# Patient Record
Sex: FEMALE | Race: WHITE | NOT HISPANIC OR LATINO | Employment: OTHER | URBAN - METROPOLITAN AREA
[De-identification: names, ages, dates, MRNs, and addresses within clinical notes are randomized per-mention and may not be internally consistent; named-entity substitution may affect disease eponyms.]

---

## 2017-05-21 ENCOUNTER — HOSPITAL ENCOUNTER (INPATIENT)
Facility: HOSPITAL | Age: 77
LOS: 11 days | DRG: 314 | End: 2017-06-01
Attending: EMERGENCY MEDICINE | Admitting: INTERNAL MEDICINE
Payer: MEDICARE

## 2017-05-21 ENCOUNTER — APPOINTMENT (EMERGENCY)
Dept: RADIOLOGY | Facility: HOSPITAL | Age: 77
DRG: 314 | End: 2017-05-21
Payer: MEDICARE

## 2017-05-21 DIAGNOSIS — N18.6 ESRD (END STAGE RENAL DISEASE) ON DIALYSIS (HCC): ICD-10-CM

## 2017-05-21 DIAGNOSIS — Z99.2 VASCULAR DIALYSIS CATHETER IN PLACE (HCC): Chronic | ICD-10-CM

## 2017-05-21 DIAGNOSIS — Z99.2 ESRD (END STAGE RENAL DISEASE) ON DIALYSIS (HCC): ICD-10-CM

## 2017-05-21 DIAGNOSIS — A41.9 SEPSIS (HCC): Primary | ICD-10-CM

## 2017-05-21 DIAGNOSIS — G20 PARKINSON'S DISEASE (HCC): Chronic | ICD-10-CM

## 2017-05-21 PROBLEM — E78.5 HYPERLIPIDEMIA: Chronic | Status: ACTIVE | Noted: 2017-05-21

## 2017-05-21 PROBLEM — D64.9 ANEMIA: Chronic | Status: ACTIVE | Noted: 2017-05-21

## 2017-05-21 PROBLEM — I10 HYPERTENSION: Chronic | Status: ACTIVE | Noted: 2017-05-21

## 2017-05-21 PROBLEM — N28.9 RENAL DISORDER: Chronic | Status: ACTIVE | Noted: 2017-05-21

## 2017-05-21 PROBLEM — E07.9 DISEASE OF THYROID GLAND: Chronic | Status: ACTIVE | Noted: 2017-05-21

## 2017-05-21 PROBLEM — M32.9 LUPUS (HCC): Chronic | Status: ACTIVE | Noted: 2017-05-21

## 2017-05-21 PROBLEM — I50.9 CHF (CONGESTIVE HEART FAILURE) (HCC): Chronic | Status: ACTIVE | Noted: 2017-05-21

## 2017-05-21 LAB
ALBUMIN SERPL BCP-MCNC: 3.3 G/DL (ref 3.5–5)
ALP SERPL-CCNC: 93 U/L (ref 46–116)
ALT SERPL W P-5'-P-CCNC: <6 U/L (ref 12–78)
ANCILLARY VALUES: 6
ANION GAP SERPL CALCULATED.3IONS-SCNC: 9 MMOL/L (ref 4–13)
ANION GAP SERPL CALCULATED.3IONS-SCNC: 9 MMOL/L (ref 4–13)
ANISOCYTOSIS BLD QL SMEAR: PRESENT
ANISOCYTOSIS BLD QL SMEAR: PRESENT
APTT PPP: 50 SECONDS (ref 23–35)
ARTERIAL PATENCY WRIST A: ABNORMAL
AST SERPL W P-5'-P-CCNC: 13 U/L (ref 5–45)
BACTERIA UR QL AUTO: ABNORMAL /HPF
BASE EXCESS BLDA CALC-SCNC: 3 MMOL/L (ref -2–3)
BASO STIPL BLD QL SMEAR: PRESENT
BASOPHILS # BLD MANUAL: 0 THOUSAND/UL (ref 0–0.1)
BASOPHILS # BLD MANUAL: 0 THOUSAND/UL (ref 0–0.1)
BASOPHILS NFR MAR MANUAL: 0 % (ref 0–1)
BASOPHILS NFR MAR MANUAL: 0 % (ref 0–1)
BILIRUB SERPL-MCNC: 0.4 MG/DL (ref 0.2–1)
BILIRUB UR QL STRIP: NEGATIVE
BUN SERPL-MCNC: 17 MG/DL (ref 5–25)
BUN SERPL-MCNC: 20 MG/DL (ref 5–25)
CALCIUM SERPL-MCNC: 8.5 MG/DL (ref 8.3–10.1)
CALCIUM SERPL-MCNC: 9.4 MG/DL (ref 8.3–10.1)
CHLORIDE SERPL-SCNC: 102 MMOL/L (ref 100–108)
CHLORIDE SERPL-SCNC: 98 MMOL/L (ref 100–108)
CLARITY UR: ABNORMAL
CO2 SERPL-SCNC: 29 MMOL/L (ref 21–32)
CO2 SERPL-SCNC: 33 MMOL/L (ref 21–32)
COLOR UR: YELLOW
CREAT SERPL-MCNC: 3.73 MG/DL (ref 0.6–1.3)
CREAT SERPL-MCNC: 3.79 MG/DL (ref 0.6–1.3)
DOHLE BOD BLD QL SMEAR: PRESENT
DS:DELIVERY SYSTEM: ABNORMAL
EOSINOPHIL # BLD MANUAL: 0 THOUSAND/UL (ref 0–0.4)
EOSINOPHIL # BLD MANUAL: 0 THOUSAND/UL (ref 0–0.4)
EOSINOPHIL NFR BLD MANUAL: 0 % (ref 0–6)
EOSINOPHIL NFR BLD MANUAL: 0 % (ref 0–6)
ERYTHROCYTE [DISTWIDTH] IN BLOOD BY AUTOMATED COUNT: 18.7 % (ref 11.6–15.1)
ERYTHROCYTE [DISTWIDTH] IN BLOOD BY AUTOMATED COUNT: 19.1 % (ref 11.6–15.1)
FIO2 GAS DIL.REBREATH: 32 L
GFR SERPL CREATININE-BSD FRML MDRD: 11.6 ML/MIN/1.73SQ M
GFR SERPL CREATININE-BSD FRML MDRD: 11.8 ML/MIN/1.73SQ M
GLUCOSE SERPL-MCNC: 203 MG/DL (ref 65–140)
GLUCOSE SERPL-MCNC: 219 MG/DL (ref 65–140)
GLUCOSE UR STRIP-MCNC: NEGATIVE MG/DL
HCO3 BLDA-SCNC: 27.7 MMOL/L (ref 22–28)
HCT VFR BLD AUTO: 29.6 % (ref 34.8–46.1)
HCT VFR BLD AUTO: 36.5 % (ref 34.8–46.1)
HGB BLD-MCNC: 11.1 G/DL (ref 11.5–15.4)
HGB BLD-MCNC: 9.1 G/DL (ref 11.5–15.4)
HGB UR QL STRIP.AUTO: ABNORMAL
INR PPP: 1.61 (ref 0.86–1.16)
KETONES UR STRIP-MCNC: ABNORMAL MG/DL
LACTATE SERPL-SCNC: 2.4 MMOL/L (ref 0.5–2)
LACTATE SERPL-SCNC: 2.7 MMOL/L (ref 0.5–2)
LACTATE SERPL-SCNC: 3.9 MMOL/L (ref 0.5–2)
LEUKOCYTE ESTERASE UR QL STRIP: ABNORMAL
LYMPHOCYTES # BLD AUTO: 17 % (ref 14–44)
LYMPHOCYTES # BLD AUTO: 26 % (ref 14–44)
LYMPHOCYTES # BLD AUTO: 3.33 THOUSAND/UL (ref 0.6–4.47)
LYMPHOCYTES # BLD AUTO: 5.74 THOUSAND/UL (ref 0.6–4.47)
MCH RBC QN AUTO: 29.4 PG (ref 26.8–34.3)
MCH RBC QN AUTO: 29.7 PG (ref 26.8–34.3)
MCHC RBC AUTO-ENTMCNC: 30.4 G/DL (ref 31.4–37.4)
MCHC RBC AUTO-ENTMCNC: 30.7 G/DL (ref 31.4–37.4)
MCV RBC AUTO: 97 FL (ref 82–98)
MCV RBC AUTO: 97 FL (ref 82–98)
MONOCYTES # BLD AUTO: 0 THOUSAND/UL (ref 0–1.22)
MONOCYTES # BLD AUTO: 0.59 THOUSAND/UL (ref 0–1.22)
MONOCYTES NFR BLD: 0 % (ref 4–12)
MONOCYTES NFR BLD: 3 % (ref 4–12)
NEUTROPHILS # BLD MANUAL: 13.7 THOUSAND/UL (ref 1.85–7.62)
NEUTROPHILS # BLD MANUAL: 16.34 THOUSAND/UL (ref 1.85–7.62)
NEUTS BAND NFR BLD MANUAL: 1 % (ref 0–8)
NEUTS BAND NFR BLD MANUAL: 6 % (ref 0–8)
NEUTS SEG NFR BLD AUTO: 68 % (ref 43–75)
NEUTS SEG NFR BLD AUTO: 69 % (ref 43–75)
NITRITE UR QL STRIP: NEGATIVE
NON-SQ EPI CELLS URNS QL MICRO: ABNORMAL /HPF
NT-PROBNP SERPL-MCNC: ABNORMAL PG/ML
PCO2 BLD: 29 MMOL/L (ref 21–32)
PCO2 BLD: 43.2 MM HG (ref 36–44)
PH BLD: 7.42 [PH] (ref 7.35–7.45)
PH UR STRIP.AUTO: 6 [PH] (ref 4.5–8)
PLATELET # BLD AUTO: 178 THOUSANDS/UL (ref 149–390)
PLATELET # BLD AUTO: 205 THOUSANDS/UL (ref 149–390)
PLATELET BLD QL SMEAR: ADEQUATE
PLATELET BLD QL SMEAR: ADEQUATE
PMV BLD AUTO: 9.2 FL (ref 8.9–12.7)
PMV BLD AUTO: 9.8 FL (ref 8.9–12.7)
PO2 BLD: 55 MM HG (ref 75–129)
POLYCHROMASIA BLD QL SMEAR: PRESENT
POLYCHROMASIA BLD QL SMEAR: PRESENT
POTASSIUM SERPL-SCNC: 3.9 MMOL/L (ref 3.5–5.3)
POTASSIUM SERPL-SCNC: 4 MMOL/L (ref 3.5–5.3)
PROT SERPL-MCNC: 7.5 G/DL (ref 6.4–8.2)
PROT UR STRIP-MCNC: ABNORMAL MG/DL
PROTHROMBIN TIME: 19 SECONDS (ref 12.1–14.4)
RBC # BLD AUTO: 3.06 MILLION/UL (ref 3.81–5.12)
RBC # BLD AUTO: 3.78 MILLION/UL (ref 3.81–5.12)
RBC #/AREA URNS AUTO: ABNORMAL /HPF
RBC MORPH BLD: PRESENT
RBC MORPH BLD: PRESENT
SAMPLE SITE: ABNORMAL
SAO2 % BLD FROM PO2: 89 % (ref 95–98)
SODIUM SERPL-SCNC: 140 MMOL/L (ref 136–145)
SODIUM SERPL-SCNC: 140 MMOL/L (ref 136–145)
SP GR UR STRIP.AUTO: 1.02 (ref 1–1.03)
SPECIMEN SOURCE: ABNORMAL
TOTAL CELLS COUNTED SPEC: 100
TOTAL CELLS COUNTED SPEC: 100
TROPONIN I SERPL-MCNC: 0.02 NG/ML
TROPONIN I SERPL-MCNC: 0.03 NG/ML
UROBILINOGEN UR QL STRIP.AUTO: 0.2 E.U./DL
VARIANT LYMPHS # BLD AUTO: 10 %
VENTILATION VALUE: 12
WBC # BLD AUTO: 19.57 THOUSAND/UL (ref 4.31–10.16)
WBC # BLD AUTO: 22.08 THOUSAND/UL (ref 4.31–10.16)
WBC #/AREA URNS AUTO: ABNORMAL /HPF

## 2017-05-21 PROCEDURE — 94660 CPAP INITIATION&MGMT: CPT

## 2017-05-21 PROCEDURE — 84484 ASSAY OF TROPONIN QUANT: CPT | Performed by: EMERGENCY MEDICINE

## 2017-05-21 PROCEDURE — 85007 BL SMEAR W/DIFF WBC COUNT: CPT | Performed by: NURSE PRACTITIONER

## 2017-05-21 PROCEDURE — 87040 BLOOD CULTURE FOR BACTERIA: CPT | Performed by: EMERGENCY MEDICINE

## 2017-05-21 PROCEDURE — 94640 AIRWAY INHALATION TREATMENT: CPT

## 2017-05-21 PROCEDURE — 87186 SC STD MICRODIL/AGAR DIL: CPT | Performed by: EMERGENCY MEDICINE

## 2017-05-21 PROCEDURE — 80048 BASIC METABOLIC PNL TOTAL CA: CPT | Performed by: NURSE PRACTITIONER

## 2017-05-21 PROCEDURE — 36415 COLL VENOUS BLD VENIPUNCTURE: CPT | Performed by: EMERGENCY MEDICINE

## 2017-05-21 PROCEDURE — 71010 HB CHEST X-RAY 1 VIEW FRONTAL (PORTABLE): CPT

## 2017-05-21 PROCEDURE — 96365 THER/PROPH/DIAG IV INF INIT: CPT

## 2017-05-21 PROCEDURE — 83605 ASSAY OF LACTIC ACID: CPT | Performed by: EMERGENCY MEDICINE

## 2017-05-21 PROCEDURE — 83880 ASSAY OF NATRIURETIC PEPTIDE: CPT | Performed by: NURSE PRACTITIONER

## 2017-05-21 PROCEDURE — 84484 ASSAY OF TROPONIN QUANT: CPT | Performed by: NURSE PRACTITIONER

## 2017-05-21 PROCEDURE — 87077 CULTURE AEROBIC IDENTIFY: CPT | Performed by: EMERGENCY MEDICINE

## 2017-05-21 PROCEDURE — 93005 ELECTROCARDIOGRAM TRACING: CPT | Performed by: EMERGENCY MEDICINE

## 2017-05-21 PROCEDURE — 83605 ASSAY OF LACTIC ACID: CPT | Performed by: NURSE PRACTITIONER

## 2017-05-21 PROCEDURE — 85027 COMPLETE CBC AUTOMATED: CPT | Performed by: NURSE PRACTITIONER

## 2017-05-21 PROCEDURE — 81001 URINALYSIS AUTO W/SCOPE: CPT | Performed by: EMERGENCY MEDICINE

## 2017-05-21 PROCEDURE — 85730 THROMBOPLASTIN TIME PARTIAL: CPT | Performed by: EMERGENCY MEDICINE

## 2017-05-21 PROCEDURE — 87147 CULTURE TYPE IMMUNOLOGIC: CPT | Performed by: EMERGENCY MEDICINE

## 2017-05-21 PROCEDURE — 85027 COMPLETE CBC AUTOMATED: CPT | Performed by: EMERGENCY MEDICINE

## 2017-05-21 PROCEDURE — 85007 BL SMEAR W/DIFF WBC COUNT: CPT | Performed by: EMERGENCY MEDICINE

## 2017-05-21 PROCEDURE — 94760 N-INVAS EAR/PLS OXIMETRY 1: CPT

## 2017-05-21 PROCEDURE — 36600 WITHDRAWAL OF ARTERIAL BLOOD: CPT

## 2017-05-21 PROCEDURE — 96367 TX/PROPH/DG ADDL SEQ IV INF: CPT

## 2017-05-21 PROCEDURE — 80053 COMPREHEN METABOLIC PANEL: CPT | Performed by: EMERGENCY MEDICINE

## 2017-05-21 PROCEDURE — 85610 PROTHROMBIN TIME: CPT | Performed by: EMERGENCY MEDICINE

## 2017-05-21 PROCEDURE — 96366 THER/PROPH/DIAG IV INF ADDON: CPT

## 2017-05-21 PROCEDURE — 82803 BLOOD GASES ANY COMBINATION: CPT

## 2017-05-21 RX ORDER — ACETAMINOPHEN 325 MG/1
650 TABLET ORAL EVERY 4 HOURS PRN
Status: ON HOLD | COMMUNITY
End: 2017-11-22

## 2017-05-21 RX ORDER — WARFARIN SODIUM 4 MG/1
4 TABLET ORAL
COMMUNITY
End: 2017-11-14

## 2017-05-21 RX ORDER — IPRATROPIUM BROMIDE AND ALBUTEROL SULFATE 2.5; .5 MG/3ML; MG/3ML
3 SOLUTION RESPIRATORY (INHALATION) ONCE
Status: COMPLETED | OUTPATIENT
Start: 2017-05-21 | End: 2017-05-21

## 2017-05-21 RX ORDER — TRAMADOL HYDROCHLORIDE 50 MG/1
50 TABLET ORAL
COMMUNITY
End: 2017-11-14

## 2017-05-21 RX ORDER — OMEPRAZOLE 20 MG/1
20 CAPSULE, DELAYED RELEASE ORAL DAILY
COMMUNITY
End: 2017-11-14

## 2017-05-21 RX ORDER — HEPARIN SODIUM 5000 [USP'U]/ML
5000 INJECTION, SOLUTION INTRAVENOUS; SUBCUTANEOUS EVERY 8 HOURS SCHEDULED
Status: DISCONTINUED | OUTPATIENT
Start: 2017-05-22 | End: 2017-06-01

## 2017-05-21 RX ORDER — HYDROXYCHLOROQUINE SULFATE 200 MG/1
200 TABLET, FILM COATED ORAL
COMMUNITY
End: 2017-11-14

## 2017-05-21 RX ORDER — NITROGLYCERIN 0.4 MG/1
0.4 TABLET SUBLINGUAL
COMMUNITY
End: 2017-11-14

## 2017-05-21 RX ORDER — ASPIRIN 325 MG
81 TABLET ORAL DAILY
COMMUNITY
End: 2017-11-14

## 2017-05-21 RX ORDER — GABAPENTIN 300 MG/1
300 CAPSULE ORAL
COMMUNITY
End: 2017-11-14

## 2017-05-21 RX ORDER — LEVOTHYROXINE SODIUM 0.07 MG/1
75 TABLET ORAL
Status: ON HOLD | COMMUNITY
End: 2017-12-08

## 2017-05-21 RX ORDER — SODIUM CHLORIDE 9 MG/ML
1000 INJECTION, SOLUTION INTRAVENOUS ONCE
Status: COMPLETED | OUTPATIENT
Start: 2017-05-21 | End: 2017-05-21

## 2017-05-21 RX ORDER — ACETAMINOPHEN 650 MG/1
650 SUPPOSITORY RECTAL ONCE
Status: COMPLETED | OUTPATIENT
Start: 2017-05-21 | End: 2017-05-21

## 2017-05-21 RX ORDER — ACETAMINOPHEN 325 MG/1
650 TABLET ORAL EVERY 6 HOURS PRN
Status: DISCONTINUED | OUTPATIENT
Start: 2017-05-21 | End: 2017-06-01 | Stop reason: HOSPADM

## 2017-05-21 RX ORDER — ONDANSETRON 2 MG/ML
4 INJECTION INTRAMUSCULAR; INTRAVENOUS EVERY 6 HOURS PRN
Status: DISCONTINUED | OUTPATIENT
Start: 2017-05-21 | End: 2017-06-01 | Stop reason: HOSPADM

## 2017-05-21 RX ORDER — LEVOFLOXACIN 5 MG/ML
750 INJECTION, SOLUTION INTRAVENOUS ONCE
Status: COMPLETED | OUTPATIENT
Start: 2017-05-21 | End: 2017-05-21

## 2017-05-21 RX ORDER — ACETAMINOPHEN 650 MG/1
325 SUPPOSITORY RECTAL ONCE
Status: DISCONTINUED | OUTPATIENT
Start: 2017-05-21 | End: 2017-05-21

## 2017-05-21 RX ORDER — ACETAMINOPHEN 650 MG/1
SUPPOSITORY RECTAL
Status: DISPENSED
Start: 2017-05-21 | End: 2017-05-22

## 2017-05-21 RX ADMIN — ACETAMINOPHEN 650 MG: 650 SUPPOSITORY RECTAL at 13:45

## 2017-05-21 RX ADMIN — SODIUM CHLORIDE 1000 ML/HR: 0.9 INJECTION, SOLUTION INTRAVENOUS at 16:40

## 2017-05-21 RX ADMIN — SODIUM CHLORIDE 2000 ML: 0.9 INJECTION, SOLUTION INTRAVENOUS at 14:25

## 2017-05-21 RX ADMIN — IPRATROPIUM BROMIDE AND ALBUTEROL SULFATE 3 ML: 2.5; .5 SOLUTION RESPIRATORY (INHALATION) at 14:45

## 2017-05-21 RX ADMIN — HEPARIN SODIUM 5000 UNITS: 5000 INJECTION, SOLUTION INTRAVENOUS; SUBCUTANEOUS at 23:53

## 2017-05-21 RX ADMIN — LEVOFLOXACIN 750 MG: 5 INJECTION, SOLUTION INTRAVENOUS at 14:25

## 2017-05-21 RX ADMIN — METRONIDAZOLE 500 MG: 500 SOLUTION INTRAVENOUS at 16:04

## 2017-05-22 ENCOUNTER — APPOINTMENT (INPATIENT)
Dept: CT IMAGING | Facility: HOSPITAL | Age: 77
DRG: 314 | End: 2017-05-22
Payer: MEDICARE

## 2017-05-22 PROBLEM — L89.150 DECUBITUS ULCER OF SACRAL REGION, UNSTAGEABLE (HCC): Status: ACTIVE | Noted: 2017-05-22

## 2017-05-22 PROBLEM — R06.02 SOB (SHORTNESS OF BREATH): Status: ACTIVE | Noted: 2017-05-22

## 2017-05-22 PROBLEM — I48.0 PAROXYSMAL ATRIAL FIBRILLATION (HCC): Status: ACTIVE | Noted: 2017-05-22

## 2017-05-22 PROBLEM — A41.9 SEVERE SEPSIS (HCC): Status: ACTIVE | Noted: 2017-05-22

## 2017-05-22 PROBLEM — Z95.0 PACEMAKER: Chronic | Status: ACTIVE | Noted: 2017-05-22

## 2017-05-22 PROBLEM — N18.6 ESRD (END STAGE RENAL DISEASE) ON DIALYSIS (HCC): Status: ACTIVE | Noted: 2017-05-22

## 2017-05-22 PROBLEM — J96.01 ACUTE RESPIRATORY FAILURE WITH HYPOXIA (HCC): Status: ACTIVE | Noted: 2017-05-22

## 2017-05-22 PROBLEM — E11.29 TYPE 2 DIABETES MELLITUS WITH RENAL MANIFESTATIONS (HCC): Status: ACTIVE | Noted: 2017-05-22

## 2017-05-22 PROBLEM — G20 PARKINSON'S DISEASE (HCC): Chronic | Status: ACTIVE | Noted: 2017-05-22

## 2017-05-22 PROBLEM — Z99.2 ESRD (END STAGE RENAL DISEASE) ON DIALYSIS (HCC): Status: ACTIVE | Noted: 2017-05-22

## 2017-05-22 PROBLEM — R65.20 SEVERE SEPSIS (HCC): Status: ACTIVE | Noted: 2017-05-22

## 2017-05-22 PROBLEM — Z99.2 VASCULAR DIALYSIS CATHETER IN PLACE (HCC): Chronic | Status: ACTIVE | Noted: 2017-05-22

## 2017-05-22 PROBLEM — J18.9 PNEUMONIA: Status: ACTIVE | Noted: 2017-05-22

## 2017-05-22 LAB
ALBUMIN SERPL BCP-MCNC: 2.6 G/DL (ref 3.5–5)
ALP SERPL-CCNC: 72 U/L (ref 46–116)
ALT SERPL W P-5'-P-CCNC: <6 U/L (ref 12–78)
ANION GAP SERPL CALCULATED.3IONS-SCNC: 7 MMOL/L (ref 4–13)
AST SERPL W P-5'-P-CCNC: 14 U/L (ref 5–45)
BILIRUB SERPL-MCNC: 0.3 MG/DL (ref 0.2–1)
BUN SERPL-MCNC: 23 MG/DL (ref 5–25)
CALCIUM SERPL-MCNC: 8.6 MG/DL (ref 8.3–10.1)
CHLORIDE SERPL-SCNC: 101 MMOL/L (ref 100–108)
CO2 SERPL-SCNC: 32 MMOL/L (ref 21–32)
CREAT SERPL-MCNC: 3.96 MG/DL (ref 0.6–1.3)
ERYTHROCYTE [DISTWIDTH] IN BLOOD BY AUTOMATED COUNT: 18.7 % (ref 11.6–15.1)
EST. AVERAGE GLUCOSE BLD GHB EST-MCNC: 108 MG/DL
GFR SERPL CREATININE-BSD FRML MDRD: 11 ML/MIN/1.73SQ M
GLUCOSE SERPL-MCNC: 116 MG/DL (ref 65–140)
GLUCOSE SERPL-MCNC: 131 MG/DL (ref 65–140)
GLUCOSE SERPL-MCNC: 155 MG/DL (ref 65–140)
GLUCOSE SERPL-MCNC: 230 MG/DL (ref 65–140)
HBA1C MFR BLD: 5.4 % (ref 4.2–6.3)
HCT VFR BLD AUTO: 27.2 % (ref 34.8–46.1)
HGB BLD-MCNC: 8.3 G/DL (ref 11.5–15.4)
LACTATE SERPL-SCNC: 2 MMOL/L (ref 0.5–2)
MAGNESIUM SERPL-MCNC: 1.8 MG/DL (ref 1.6–2.6)
MCH RBC QN AUTO: 29.5 PG (ref 26.8–34.3)
MCHC RBC AUTO-ENTMCNC: 30.5 G/DL (ref 31.4–37.4)
MCV RBC AUTO: 97 FL (ref 82–98)
PLATELET # BLD AUTO: 180 THOUSANDS/UL (ref 149–390)
PLATELET # BLD AUTO: 189 THOUSANDS/UL (ref 149–390)
PMV BLD AUTO: 9.4 FL (ref 8.9–12.7)
PMV BLD AUTO: 9.5 FL (ref 8.9–12.7)
POTASSIUM SERPL-SCNC: 4 MMOL/L (ref 3.5–5.3)
PROT SERPL-MCNC: 6.1 G/DL (ref 6.4–8.2)
RBC # BLD AUTO: 2.81 MILLION/UL (ref 3.81–5.12)
SODIUM SERPL-SCNC: 140 MMOL/L (ref 136–145)
TROPONIN I SERPL-MCNC: 0.03 NG/ML
TROPONIN I SERPL-MCNC: 0.03 NG/ML
WBC # BLD AUTO: 15.62 THOUSAND/UL (ref 4.31–10.16)

## 2017-05-22 PROCEDURE — 87040 BLOOD CULTURE FOR BACTERIA: CPT | Performed by: NURSE PRACTITIONER

## 2017-05-22 PROCEDURE — 83036 HEMOGLOBIN GLYCOSYLATED A1C: CPT | Performed by: NURSE PRACTITIONER

## 2017-05-22 PROCEDURE — 80053 COMPREHEN METABOLIC PANEL: CPT | Performed by: NURSE PRACTITIONER

## 2017-05-22 PROCEDURE — 94760 N-INVAS EAR/PLS OXIMETRY 1: CPT

## 2017-05-22 PROCEDURE — 84484 ASSAY OF TROPONIN QUANT: CPT | Performed by: NURSE PRACTITIONER

## 2017-05-22 PROCEDURE — 83605 ASSAY OF LACTIC ACID: CPT | Performed by: NURSE PRACTITIONER

## 2017-05-22 PROCEDURE — 94640 AIRWAY INHALATION TREATMENT: CPT

## 2017-05-22 PROCEDURE — 36415 COLL VENOUS BLD VENIPUNCTURE: CPT | Performed by: NURSE PRACTITIONER

## 2017-05-22 PROCEDURE — 83735 ASSAY OF MAGNESIUM: CPT | Performed by: NURSE PRACTITIONER

## 2017-05-22 PROCEDURE — 87147 CULTURE TYPE IMMUNOLOGIC: CPT | Performed by: NURSE PRACTITIONER

## 2017-05-22 PROCEDURE — 85049 AUTOMATED PLATELET COUNT: CPT | Performed by: NURSE PRACTITIONER

## 2017-05-22 PROCEDURE — 92610 EVALUATE SWALLOWING FUNCTION: CPT

## 2017-05-22 PROCEDURE — 82948 REAGENT STRIP/BLOOD GLUCOSE: CPT

## 2017-05-22 PROCEDURE — 99285 EMERGENCY DEPT VISIT HI MDM: CPT

## 2017-05-22 PROCEDURE — 87077 CULTURE AEROBIC IDENTIFY: CPT | Performed by: NURSE PRACTITIONER

## 2017-05-22 PROCEDURE — 85027 COMPLETE CBC AUTOMATED: CPT | Performed by: NURSE PRACTITIONER

## 2017-05-22 PROCEDURE — 74176 CT ABD & PELVIS W/O CONTRAST: CPT

## 2017-05-22 RX ORDER — LEVOFLOXACIN 5 MG/ML
500 INJECTION, SOLUTION INTRAVENOUS
Status: DISCONTINUED | OUTPATIENT
Start: 2017-05-23 | End: 2017-05-23

## 2017-05-22 RX ORDER — TRAMADOL HYDROCHLORIDE 50 MG/1
50 TABLET ORAL EVERY 4 HOURS PRN
Status: DISCONTINUED | OUTPATIENT
Start: 2017-05-22 | End: 2017-06-01 | Stop reason: HOSPADM

## 2017-05-22 RX ORDER — ASPIRIN 325 MG
325 TABLET ORAL DAILY
Status: DISCONTINUED | OUTPATIENT
Start: 2017-05-22 | End: 2017-06-01 | Stop reason: HOSPADM

## 2017-05-22 RX ORDER — WARFARIN SODIUM 4 MG/1
4 TABLET ORAL
Status: DISCONTINUED | OUTPATIENT
Start: 2017-05-22 | End: 2017-06-01 | Stop reason: HOSPADM

## 2017-05-22 RX ORDER — BISACODYL 10 MG
10 SUPPOSITORY, RECTAL RECTAL DAILY PRN
Status: DISCONTINUED | OUTPATIENT
Start: 2017-05-22 | End: 2017-06-01 | Stop reason: HOSPADM

## 2017-05-22 RX ORDER — LEVOTHYROXINE SODIUM 0.07 MG/1
75 TABLET ORAL
Status: DISCONTINUED | OUTPATIENT
Start: 2017-05-22 | End: 2017-06-01 | Stop reason: HOSPADM

## 2017-05-22 RX ORDER — GABAPENTIN 300 MG/1
300 CAPSULE ORAL
Status: DISCONTINUED | OUTPATIENT
Start: 2017-05-22 | End: 2017-06-01 | Stop reason: HOSPADM

## 2017-05-22 RX ORDER — LINEZOLID 2 MG/ML
600 INJECTION, SOLUTION INTRAVENOUS EVERY 12 HOURS
Status: DISCONTINUED | OUTPATIENT
Start: 2017-05-22 | End: 2017-05-23

## 2017-05-22 RX ORDER — IPRATROPIUM BROMIDE AND ALBUTEROL SULFATE 2.5; .5 MG/3ML; MG/3ML
3 SOLUTION RESPIRATORY (INHALATION)
Status: DISCONTINUED | OUTPATIENT
Start: 2017-05-22 | End: 2017-05-23

## 2017-05-22 RX ORDER — HYDROXYCHLOROQUINE SULFATE 200 MG/1
200 TABLET, FILM COATED ORAL
Status: DISCONTINUED | OUTPATIENT
Start: 2017-05-22 | End: 2017-06-01 | Stop reason: HOSPADM

## 2017-05-22 RX ORDER — LOPERAMIDE HYDROCHLORIDE 2 MG/1
2 CAPSULE ORAL 4 TIMES DAILY PRN
Status: DISCONTINUED | OUTPATIENT
Start: 2017-05-22 | End: 2017-06-01 | Stop reason: HOSPADM

## 2017-05-22 RX ORDER — PANTOPRAZOLE SODIUM 20 MG/1
20 TABLET, DELAYED RELEASE ORAL
Status: DISCONTINUED | OUTPATIENT
Start: 2017-05-22 | End: 2017-06-01 | Stop reason: HOSPADM

## 2017-05-22 RX ADMIN — GABAPENTIN 300 MG: 300 CAPSULE ORAL at 02:44

## 2017-05-22 RX ADMIN — METOPROLOL TARTRATE 25 MG: 25 TABLET ORAL at 10:05

## 2017-05-22 RX ADMIN — CARBIDOPA AND LEVODOPA 1 TABLET: 25; 100 TABLET ORAL at 10:05

## 2017-05-22 RX ADMIN — HEPARIN SODIUM 5000 UNITS: 5000 INJECTION, SOLUTION INTRAVENOUS; SUBCUTANEOUS at 21:50

## 2017-05-22 RX ADMIN — INSULIN LISPRO 2 UNITS: 100 INJECTION, SOLUTION INTRAVENOUS; SUBCUTANEOUS at 16:21

## 2017-05-22 RX ADMIN — ASPIRIN 325 MG ORAL TABLET 325 MG: 325 PILL ORAL at 10:05

## 2017-05-22 RX ADMIN — METRONIDAZOLE 500 MG: 500 INJECTION, SOLUTION INTRAVENOUS at 17:49

## 2017-05-22 RX ADMIN — CARBIDOPA AND LEVODOPA 1 TABLET: 25; 100 TABLET ORAL at 21:50

## 2017-05-22 RX ADMIN — IPRATROPIUM BROMIDE AND ALBUTEROL SULFATE 3 ML: 2.5; .5 SOLUTION RESPIRATORY (INHALATION) at 19:17

## 2017-05-22 RX ADMIN — IPRATROPIUM BROMIDE AND ALBUTEROL SULFATE 3 ML: 2.5; .5 SOLUTION RESPIRATORY (INHALATION) at 04:37

## 2017-05-22 RX ADMIN — HEPARIN SODIUM 5000 UNITS: 5000 INJECTION, SOLUTION INTRAVENOUS; SUBCUTANEOUS at 06:07

## 2017-05-22 RX ADMIN — PANTOPRAZOLE SODIUM 20 MG: 20 TABLET, DELAYED RELEASE ORAL at 06:08

## 2017-05-22 RX ADMIN — IPRATROPIUM BROMIDE AND ALBUTEROL SULFATE 3 ML: 2.5; .5 SOLUTION RESPIRATORY (INHALATION) at 07:34

## 2017-05-22 RX ADMIN — TRAMADOL HYDROCHLORIDE 50 MG: 50 TABLET, FILM COATED ORAL at 02:46

## 2017-05-22 RX ADMIN — LEVOTHYROXINE SODIUM 75 MCG: 75 TABLET ORAL at 21:50

## 2017-05-22 RX ADMIN — METRONIDAZOLE 500 MG: 500 INJECTION, SOLUTION INTRAVENOUS at 10:40

## 2017-05-22 RX ADMIN — WARFARIN SODIUM 4 MG: 4 TABLET ORAL at 17:49

## 2017-05-22 RX ADMIN — INSULIN LISPRO 1 UNITS: 100 INJECTION, SOLUTION INTRAVENOUS; SUBCUTANEOUS at 11:48

## 2017-05-22 RX ADMIN — LEVOTHYROXINE SODIUM 75 MCG: 75 TABLET ORAL at 02:44

## 2017-05-22 RX ADMIN — INSULIN LISPRO 3 UNITS: 100 INJECTION, SOLUTION INTRAVENOUS; SUBCUTANEOUS at 22:47

## 2017-05-22 RX ADMIN — LINEZOLID 600 MG: 600 INJECTION, SOLUTION INTRAVENOUS at 16:22

## 2017-05-22 RX ADMIN — METOPROLOL TARTRATE 25 MG: 25 TABLET ORAL at 17:49

## 2017-05-22 RX ADMIN — HEPARIN SODIUM 5000 UNITS: 5000 INJECTION, SOLUTION INTRAVENOUS; SUBCUTANEOUS at 14:16

## 2017-05-22 RX ADMIN — IPRATROPIUM BROMIDE AND ALBUTEROL SULFATE 3 ML: 2.5; .5 SOLUTION RESPIRATORY (INHALATION) at 12:08

## 2017-05-22 RX ADMIN — IPRATROPIUM BROMIDE AND ALBUTEROL SULFATE 3 ML: 2.5; .5 SOLUTION RESPIRATORY (INHALATION) at 15:04

## 2017-05-22 RX ADMIN — CALCIUM ACETATE 667 MG: 667 CAPSULE ORAL at 16:20

## 2017-05-22 RX ADMIN — CARBIDOPA AND LEVODOPA 1 TABLET: 25; 100 TABLET ORAL at 16:20

## 2017-05-22 RX ADMIN — METRONIDAZOLE 500 MG: 500 INJECTION, SOLUTION INTRAVENOUS at 04:00

## 2017-05-22 RX ADMIN — GABAPENTIN 300 MG: 300 CAPSULE ORAL at 21:50

## 2017-05-22 RX ADMIN — LINEZOLID 600 MG: 600 INJECTION, SOLUTION INTRAVENOUS at 02:39

## 2017-05-22 RX ADMIN — INSULIN DETEMIR 14 UNITS: 100 INJECTION, SOLUTION SUBCUTANEOUS at 18:51

## 2017-05-23 ENCOUNTER — APPOINTMENT (INPATIENT)
Dept: RADIOLOGY | Facility: HOSPITAL | Age: 77
DRG: 314 | End: 2017-05-23
Payer: MEDICARE

## 2017-05-23 ENCOUNTER — APPOINTMENT (INPATIENT)
Dept: DIALYSIS | Facility: HOSPITAL | Age: 77
DRG: 314 | End: 2017-05-23
Payer: MEDICARE

## 2017-05-23 LAB
ANION GAP SERPL CALCULATED.3IONS-SCNC: 9 MMOL/L (ref 4–13)
ATRIAL RATE: 80 BPM
BASOPHILS # BLD AUTO: 0.02 THOUSANDS/ΜL (ref 0–0.1)
BASOPHILS NFR BLD AUTO: 0 % (ref 0–1)
BUN SERPL-MCNC: 29 MG/DL (ref 5–25)
CALCIUM SERPL-MCNC: 8.2 MG/DL (ref 8.3–10.1)
CHLORIDE SERPL-SCNC: 100 MMOL/L (ref 100–108)
CO2 SERPL-SCNC: 29 MMOL/L (ref 21–32)
CREAT SERPL-MCNC: 4.89 MG/DL (ref 0.6–1.3)
EOSINOPHIL # BLD AUTO: 0.12 THOUSAND/ΜL (ref 0–0.61)
EOSINOPHIL NFR BLD AUTO: 1 % (ref 0–6)
ERYTHROCYTE [DISTWIDTH] IN BLOOD BY AUTOMATED COUNT: 18.7 % (ref 11.6–15.1)
FERRITIN SERPL-MCNC: 1385 NG/ML (ref 8–388)
GFR SERPL CREATININE-BSD FRML MDRD: 8.6 ML/MIN/1.73SQ M
GLUCOSE SERPL-MCNC: 152 MG/DL (ref 65–140)
GLUCOSE SERPL-MCNC: 159 MG/DL (ref 65–140)
GLUCOSE SERPL-MCNC: 162 MG/DL (ref 65–140)
GLUCOSE SERPL-MCNC: 192 MG/DL (ref 65–140)
GLUCOSE SERPL-MCNC: 205 MG/DL (ref 65–140)
GLUCOSE SERPL-MCNC: 72 MG/DL (ref 65–140)
HCT VFR BLD AUTO: 25.3 % (ref 34.8–46.1)
HCT VFR BLD AUTO: 26.1 % (ref 34.8–46.1)
HGB BLD-MCNC: 7.5 G/DL (ref 11.5–15.4)
HGB BLD-MCNC: 7.7 G/DL (ref 11.5–15.4)
INR PPP: 2.11 (ref 0.86–1.16)
IRON SATN MFR SERPL: 13 %
IRON SERPL-MCNC: 22 UG/DL (ref 50–170)
LYMPHOCYTES # BLD AUTO: 3.7 THOUSANDS/ΜL (ref 0.6–4.47)
LYMPHOCYTES NFR BLD AUTO: 31 % (ref 14–44)
MCH RBC QN AUTO: 28.5 PG (ref 26.8–34.3)
MCHC RBC AUTO-ENTMCNC: 29.5 G/DL (ref 31.4–37.4)
MCV RBC AUTO: 97 FL (ref 82–98)
MONOCYTES # BLD AUTO: 0.82 THOUSAND/ΜL (ref 0.17–1.22)
MONOCYTES NFR BLD AUTO: 7 % (ref 4–12)
NEUTROPHILS # BLD AUTO: 7.13 THOUSANDS/ΜL (ref 1.85–7.62)
NEUTS SEG NFR BLD AUTO: 61 % (ref 43–75)
PLATELET # BLD AUTO: 186 THOUSANDS/UL (ref 149–390)
PMV BLD AUTO: 9.2 FL (ref 8.9–12.7)
POTASSIUM SERPL-SCNC: 4.1 MMOL/L (ref 3.5–5.3)
PR INTERVAL: 248 MS
PROTHROMBIN TIME: 23.5 SECONDS (ref 12.1–14.4)
QRS AXIS: -23 DEGREES
QRSD INTERVAL: 146 MS
QT INTERVAL: 442 MS
QTC INTERVAL: 509 MS
RBC # BLD AUTO: 2.7 MILLION/UL (ref 3.81–5.12)
SODIUM SERPL-SCNC: 138 MMOL/L (ref 136–145)
T WAVE AXIS: -17 DEGREES
TIBC SERPL-MCNC: 167 UG/DL (ref 250–450)
VENTRICULAR RATE: 80 BPM
WBC # BLD AUTO: 11.79 THOUSAND/UL (ref 4.31–10.16)

## 2017-05-23 PROCEDURE — 94760 N-INVAS EAR/PLS OXIMETRY 1: CPT

## 2017-05-23 PROCEDURE — 83550 IRON BINDING TEST: CPT | Performed by: INTERNAL MEDICINE

## 2017-05-23 PROCEDURE — 85014 HEMATOCRIT: CPT | Performed by: NURSE PRACTITIONER

## 2017-05-23 PROCEDURE — 85018 HEMOGLOBIN: CPT | Performed by: NURSE PRACTITIONER

## 2017-05-23 PROCEDURE — 80048 BASIC METABOLIC PNL TOTAL CA: CPT | Performed by: INTERNAL MEDICINE

## 2017-05-23 PROCEDURE — 94640 AIRWAY INHALATION TREATMENT: CPT

## 2017-05-23 PROCEDURE — 71010 HB CHEST X-RAY 1 VIEW FRONTAL (PORTABLE): CPT

## 2017-05-23 PROCEDURE — 85025 COMPLETE CBC W/AUTO DIFF WBC: CPT | Performed by: INTERNAL MEDICINE

## 2017-05-23 PROCEDURE — 83540 ASSAY OF IRON: CPT | Performed by: INTERNAL MEDICINE

## 2017-05-23 PROCEDURE — 85610 PROTHROMBIN TIME: CPT | Performed by: INTERNAL MEDICINE

## 2017-05-23 PROCEDURE — 82728 ASSAY OF FERRITIN: CPT | Performed by: INTERNAL MEDICINE

## 2017-05-23 PROCEDURE — 82948 REAGENT STRIP/BLOOD GLUCOSE: CPT

## 2017-05-23 PROCEDURE — 5A1D60Z PERFORMANCE OF URINARY FILTRATION, MULTIPLE: ICD-10-PCS | Performed by: INTERNAL MEDICINE

## 2017-05-23 RX ORDER — ALBUTEROL SULFATE 2.5 MG/3ML
2.5 SOLUTION RESPIRATORY (INHALATION) EVERY 4 HOURS PRN
Status: DISCONTINUED | OUTPATIENT
Start: 2017-05-23 | End: 2017-06-01 | Stop reason: HOSPADM

## 2017-05-23 RX ORDER — IPRATROPIUM BROMIDE AND ALBUTEROL SULFATE 2.5; .5 MG/3ML; MG/3ML
SOLUTION RESPIRATORY (INHALATION)
Status: COMPLETED
Start: 2017-05-23 | End: 2017-05-23

## 2017-05-23 RX ORDER — IPRATROPIUM BROMIDE AND ALBUTEROL SULFATE 2.5; .5 MG/3ML; MG/3ML
3 SOLUTION RESPIRATORY (INHALATION)
Status: DISCONTINUED | OUTPATIENT
Start: 2017-05-23 | End: 2017-06-01 | Stop reason: HOSPADM

## 2017-05-23 RX ADMIN — PANTOPRAZOLE SODIUM 20 MG: 20 TABLET, DELAYED RELEASE ORAL at 05:44

## 2017-05-23 RX ADMIN — INSULIN LISPRO 6 UNITS: 100 INJECTION, SOLUTION INTRAVENOUS; SUBCUTANEOUS at 17:43

## 2017-05-23 RX ADMIN — IPRATROPIUM BROMIDE AND ALBUTEROL SULFATE 3 ML: 2.5; .5 SOLUTION RESPIRATORY (INHALATION) at 00:50

## 2017-05-23 RX ADMIN — INSULIN DETEMIR 14 UNITS: 100 INJECTION, SOLUTION SUBCUTANEOUS at 17:29

## 2017-05-23 RX ADMIN — METRONIDAZOLE 500 MG: 500 INJECTION, SOLUTION INTRAVENOUS at 02:41

## 2017-05-23 RX ADMIN — INSULIN LISPRO 2 UNITS: 100 INJECTION, SOLUTION INTRAVENOUS; SUBCUTANEOUS at 17:43

## 2017-05-23 RX ADMIN — IPRATROPIUM BROMIDE AND ALBUTEROL SULFATE 3 ML: 2.5; .5 SOLUTION RESPIRATORY (INHALATION) at 08:46

## 2017-05-23 RX ADMIN — IPRATROPIUM BROMIDE AND ALBUTEROL SULFATE 3 ML: 2.5; .5 SOLUTION RESPIRATORY (INHALATION) at 04:35

## 2017-05-23 RX ADMIN — CARBIDOPA AND LEVODOPA 1 TABLET: 25; 100 TABLET ORAL at 16:31

## 2017-05-23 RX ADMIN — IPRATROPIUM BROMIDE AND ALBUTEROL SULFATE 3 ML: 2.5; .5 SOLUTION RESPIRATORY (INHALATION) at 19:49

## 2017-05-23 RX ADMIN — CALCIUM ACETATE 667 MG: 667 CAPSULE ORAL at 16:31

## 2017-05-23 RX ADMIN — TRAMADOL HYDROCHLORIDE 50 MG: 50 TABLET, FILM COATED ORAL at 05:44

## 2017-05-23 RX ADMIN — METOPROLOL TARTRATE 25 MG: 25 TABLET ORAL at 12:19

## 2017-05-23 RX ADMIN — CALCIUM ACETATE 667 MG: 667 CAPSULE ORAL at 12:19

## 2017-05-23 RX ADMIN — HYDROXYCHLOROQUINE SULFATE 200 MG: 200 TABLET, FILM COATED ORAL at 22:44

## 2017-05-23 RX ADMIN — HEPARIN SODIUM 5000 UNITS: 5000 INJECTION, SOLUTION INTRAVENOUS; SUBCUTANEOUS at 22:43

## 2017-05-23 RX ADMIN — IPRATROPIUM BROMIDE AND ALBUTEROL SULFATE 3 ML: 2.5; .5 SOLUTION RESPIRATORY (INHALATION) at 13:35

## 2017-05-23 RX ADMIN — HEPARIN SODIUM 5000 UNITS: 5000 INJECTION, SOLUTION INTRAVENOUS; SUBCUTANEOUS at 05:44

## 2017-05-23 RX ADMIN — METOPROLOL TARTRATE 25 MG: 25 TABLET ORAL at 17:29

## 2017-05-23 RX ADMIN — LINEZOLID 600 MG: 600 INJECTION, SOLUTION INTRAVENOUS at 03:50

## 2017-05-23 RX ADMIN — IPRATROPIUM BROMIDE AND ALBUTEROL SULFATE 3 ML: .5; 3 SOLUTION RESPIRATORY (INHALATION) at 00:50

## 2017-05-23 RX ADMIN — METRONIDAZOLE 500 MG: 500 INJECTION, SOLUTION INTRAVENOUS at 12:20

## 2017-05-23 RX ADMIN — WARFARIN SODIUM 4 MG: 4 TABLET ORAL at 17:29

## 2017-05-23 RX ADMIN — HYDROXYCHLOROQUINE SULFATE 200 MG: 200 TABLET, FILM COATED ORAL at 00:00

## 2017-05-23 RX ADMIN — INSULIN LISPRO 1 UNITS: 100 INJECTION, SOLUTION INTRAVENOUS; SUBCUTANEOUS at 12:20

## 2017-05-23 RX ADMIN — INSULIN LISPRO 1 UNITS: 100 INJECTION, SOLUTION INTRAVENOUS; SUBCUTANEOUS at 06:42

## 2017-05-23 RX ADMIN — INSULIN LISPRO 6 UNITS: 100 INJECTION, SOLUTION INTRAVENOUS; SUBCUTANEOUS at 12:19

## 2017-05-23 RX ADMIN — GABAPENTIN 300 MG: 300 CAPSULE ORAL at 22:43

## 2017-05-23 RX ADMIN — DAPTOMYCIN 625 MG: 500 INJECTION, POWDER, LYOPHILIZED, FOR SOLUTION INTRAVENOUS at 17:30

## 2017-05-23 RX ADMIN — ASPIRIN 325 MG ORAL TABLET 325 MG: 325 PILL ORAL at 12:19

## 2017-05-23 RX ADMIN — CARBIDOPA AND LEVODOPA 1 TABLET: 25; 100 TABLET ORAL at 22:43

## 2017-05-23 RX ADMIN — TRAMADOL HYDROCHLORIDE 50 MG: 50 TABLET, FILM COATED ORAL at 16:31

## 2017-05-23 RX ADMIN — LEVOTHYROXINE SODIUM 75 MCG: 75 TABLET ORAL at 22:43

## 2017-05-24 ENCOUNTER — APPOINTMENT (INPATIENT)
Dept: NON INVASIVE DIAGNOSTICS | Facility: HOSPITAL | Age: 77
DRG: 314 | End: 2017-05-24
Payer: MEDICARE

## 2017-05-24 ENCOUNTER — APPOINTMENT (INPATIENT)
Dept: RADIOLOGY | Facility: HOSPITAL | Age: 77
DRG: 314 | End: 2017-05-24
Payer: MEDICARE

## 2017-05-24 PROBLEM — T82.7XXA HEMODIALYSIS CATHETER INFECTION (HCC): Status: ACTIVE | Noted: 2017-05-24

## 2017-05-24 LAB
ALBUMIN SERPL BCP-MCNC: 2.7 G/DL (ref 3.5–5)
ALP SERPL-CCNC: 70 U/L (ref 46–116)
ALT SERPL W P-5'-P-CCNC: <6 U/L (ref 12–78)
ANION GAP SERPL CALCULATED.3IONS-SCNC: 4 MMOL/L (ref 4–13)
AST SERPL W P-5'-P-CCNC: 10 U/L (ref 5–45)
BILIRUB SERPL-MCNC: 0.4 MG/DL (ref 0.2–1)
BUN SERPL-MCNC: 15 MG/DL (ref 5–25)
CALCIUM SERPL-MCNC: 9 MG/DL (ref 8.3–10.1)
CHLORIDE SERPL-SCNC: 101 MMOL/L (ref 100–108)
CK SERPL-CCNC: 21 U/L (ref 26–192)
CO2 SERPL-SCNC: 34 MMOL/L (ref 21–32)
CREAT SERPL-MCNC: 3.2 MG/DL (ref 0.6–1.3)
ERYTHROCYTE [DISTWIDTH] IN BLOOD BY AUTOMATED COUNT: 18.5 % (ref 11.6–15.1)
GFR SERPL CREATININE-BSD FRML MDRD: 14.1 ML/MIN/1.73SQ M
GLUCOSE SERPL-MCNC: 177 MG/DL (ref 65–140)
GLUCOSE SERPL-MCNC: 214 MG/DL (ref 65–140)
GLUCOSE SERPL-MCNC: 81 MG/DL (ref 65–140)
GLUCOSE SERPL-MCNC: 83 MG/DL (ref 65–140)
GLUCOSE SERPL-MCNC: 97 MG/DL (ref 65–140)
HCT VFR BLD AUTO: 27.4 % (ref 34.8–46.1)
HGB BLD-MCNC: 8 G/DL (ref 11.5–15.4)
MCH RBC QN AUTO: 28.7 PG (ref 26.8–34.3)
MCHC RBC AUTO-ENTMCNC: 29.2 G/DL (ref 31.4–37.4)
MCV RBC AUTO: 98 FL (ref 82–98)
PLATELET # BLD AUTO: 210 THOUSANDS/UL (ref 149–390)
PMV BLD AUTO: 9.3 FL (ref 8.9–12.7)
POTASSIUM SERPL-SCNC: 4.3 MMOL/L (ref 3.5–5.3)
PROT SERPL-MCNC: 6.6 G/DL (ref 6.4–8.2)
RBC # BLD AUTO: 2.79 MILLION/UL (ref 3.81–5.12)
SODIUM SERPL-SCNC: 139 MMOL/L (ref 136–145)
WBC # BLD AUTO: 10.7 THOUSAND/UL (ref 4.31–10.16)

## 2017-05-24 PROCEDURE — C8929 TTE W OR WO FOL WCON,DOPPLER: HCPCS

## 2017-05-24 PROCEDURE — 82948 REAGENT STRIP/BLOOD GLUCOSE: CPT

## 2017-05-24 PROCEDURE — 87070 CULTURE OTHR SPECIMN AEROBIC: CPT | Performed by: INTERNAL MEDICINE

## 2017-05-24 PROCEDURE — 94760 N-INVAS EAR/PLS OXIMETRY 1: CPT

## 2017-05-24 PROCEDURE — 94640 AIRWAY INHALATION TREATMENT: CPT

## 2017-05-24 PROCEDURE — 87147 CULTURE TYPE IMMUNOLOGIC: CPT | Performed by: INTERNAL MEDICINE

## 2017-05-24 PROCEDURE — 87186 SC STD MICRODIL/AGAR DIL: CPT | Performed by: INTERNAL MEDICINE

## 2017-05-24 PROCEDURE — 85027 COMPLETE CBC AUTOMATED: CPT | Performed by: INTERNAL MEDICINE

## 2017-05-24 PROCEDURE — 82270 OCCULT BLOOD FECES: CPT | Performed by: INTERNAL MEDICINE

## 2017-05-24 PROCEDURE — 82550 ASSAY OF CK (CPK): CPT | Performed by: INTERNAL MEDICINE

## 2017-05-24 PROCEDURE — 05PYX3Z REMOVAL OF INFUSION DEVICE FROM UPPER VEIN, EXTERNAL APPROACH: ICD-10-PCS | Performed by: INTERNAL MEDICINE

## 2017-05-24 PROCEDURE — 80053 COMPREHEN METABOLIC PANEL: CPT | Performed by: INTERNAL MEDICINE

## 2017-05-24 PROCEDURE — 36589 REMOVAL TUNNELED CV CATH: CPT

## 2017-05-24 RX ADMIN — PANTOPRAZOLE SODIUM 20 MG: 20 TABLET, DELAYED RELEASE ORAL at 05:10

## 2017-05-24 RX ADMIN — METOPROLOL TARTRATE 25 MG: 25 TABLET ORAL at 17:55

## 2017-05-24 RX ADMIN — INSULIN LISPRO 2 UNITS: 100 INJECTION, SOLUTION INTRAVENOUS; SUBCUTANEOUS at 21:07

## 2017-05-24 RX ADMIN — ASPIRIN 325 MG ORAL TABLET 325 MG: 325 PILL ORAL at 08:18

## 2017-05-24 RX ADMIN — CARBIDOPA AND LEVODOPA 1 TABLET: 25; 100 TABLET ORAL at 08:18

## 2017-05-24 RX ADMIN — IPRATROPIUM BROMIDE AND ALBUTEROL SULFATE 3 ML: 2.5; .5 SOLUTION RESPIRATORY (INHALATION) at 08:48

## 2017-05-24 RX ADMIN — HEPARIN SODIUM 5000 UNITS: 5000 INJECTION, SOLUTION INTRAVENOUS; SUBCUTANEOUS at 05:09

## 2017-05-24 RX ADMIN — IPRATROPIUM BROMIDE AND ALBUTEROL SULFATE 3 ML: 2.5; .5 SOLUTION RESPIRATORY (INHALATION) at 01:33

## 2017-05-24 RX ADMIN — INSULIN LISPRO 6 UNITS: 100 INJECTION, SOLUTION INTRAVENOUS; SUBCUTANEOUS at 08:19

## 2017-05-24 RX ADMIN — INSULIN DETEMIR 8 UNITS: 100 INJECTION, SOLUTION SUBCUTANEOUS at 18:13

## 2017-05-24 RX ADMIN — CALCIUM ACETATE 667 MG: 667 CAPSULE ORAL at 12:56

## 2017-05-24 RX ADMIN — CALCIUM ACETATE 667 MG: 667 CAPSULE ORAL at 08:19

## 2017-05-24 RX ADMIN — CARBIDOPA AND LEVODOPA 1 TABLET: 25; 100 TABLET ORAL at 21:08

## 2017-05-24 RX ADMIN — LEVOTHYROXINE SODIUM 75 MCG: 75 TABLET ORAL at 21:08

## 2017-05-24 RX ADMIN — HYDROXYCHLOROQUINE SULFATE 200 MG: 200 TABLET, FILM COATED ORAL at 21:09

## 2017-05-24 RX ADMIN — HEPARIN SODIUM 5000 UNITS: 5000 INJECTION, SOLUTION INTRAVENOUS; SUBCUTANEOUS at 21:05

## 2017-05-24 RX ADMIN — GABAPENTIN 300 MG: 300 CAPSULE ORAL at 21:08

## 2017-05-24 RX ADMIN — CALCIUM ACETATE 667 MG: 667 CAPSULE ORAL at 17:55

## 2017-05-24 RX ADMIN — PERFLUTREN 3 ML/MIN: 6.52 INJECTION, SUSPENSION INTRAVENOUS at 11:11

## 2017-05-24 RX ADMIN — WARFARIN SODIUM 4 MG: 4 TABLET ORAL at 17:55

## 2017-05-24 RX ADMIN — HEPARIN SODIUM 5000 UNITS: 5000 INJECTION, SOLUTION INTRAVENOUS; SUBCUTANEOUS at 17:54

## 2017-05-24 RX ADMIN — IPRATROPIUM BROMIDE AND ALBUTEROL SULFATE 3 ML: 2.5; .5 SOLUTION RESPIRATORY (INHALATION) at 13:09

## 2017-05-24 RX ADMIN — IPRATROPIUM BROMIDE AND ALBUTEROL SULFATE 3 ML: 2.5; .5 SOLUTION RESPIRATORY (INHALATION) at 21:00

## 2017-05-24 RX ADMIN — CARBIDOPA AND LEVODOPA 1 TABLET: 25; 100 TABLET ORAL at 17:55

## 2017-05-24 RX ADMIN — METOPROLOL TARTRATE 25 MG: 25 TABLET ORAL at 08:18

## 2017-05-24 RX ADMIN — CALCIUM ACETATE 667 MG: 667 CAPSULE ORAL at 12:53

## 2017-05-25 ENCOUNTER — APPOINTMENT (INPATIENT)
Dept: DIALYSIS | Facility: HOSPITAL | Age: 77
DRG: 314 | End: 2017-05-25
Attending: INTERNAL MEDICINE
Payer: MEDICARE

## 2017-05-25 PROBLEM — R65.20 SEVERE SEPSIS (HCC): Status: RESOLVED | Noted: 2017-05-22 | Resolved: 2017-05-25

## 2017-05-25 PROBLEM — A41.9 SEVERE SEPSIS (HCC): Status: RESOLVED | Noted: 2017-05-22 | Resolved: 2017-05-25

## 2017-05-25 LAB
GLUCOSE SERPL-MCNC: 113 MG/DL (ref 65–140)
GLUCOSE SERPL-MCNC: 175 MG/DL (ref 65–140)
GLUCOSE SERPL-MCNC: 234 MG/DL (ref 65–140)
GLUCOSE SERPL-MCNC: 256 MG/DL (ref 65–140)
HEMOCCULT SP1 STL QL: NEGATIVE
HEMOCCULT SP2 STL QL: NEGATIVE
HEMOCCULT STL QL: NEGATIVE
INR PPP: 2.42 (ref 0.86–1.16)
PROTHROMBIN TIME: 26.2 SECONDS (ref 12.1–14.4)

## 2017-05-25 PROCEDURE — G8978 MOBILITY CURRENT STATUS: HCPCS

## 2017-05-25 PROCEDURE — 94640 AIRWAY INHALATION TREATMENT: CPT

## 2017-05-25 PROCEDURE — 82272 OCCULT BLD FECES 1-3 TESTS: CPT | Performed by: INTERNAL MEDICINE

## 2017-05-25 PROCEDURE — 97162 PT EVAL MOD COMPLEX 30 MIN: CPT

## 2017-05-25 PROCEDURE — 87040 BLOOD CULTURE FOR BACTERIA: CPT | Performed by: INTERNAL MEDICINE

## 2017-05-25 PROCEDURE — G8980 MOBILITY D/C STATUS: HCPCS

## 2017-05-25 PROCEDURE — 85610 PROTHROMBIN TIME: CPT | Performed by: NURSE PRACTITIONER

## 2017-05-25 PROCEDURE — G8979 MOBILITY GOAL STATUS: HCPCS

## 2017-05-25 PROCEDURE — 82948 REAGENT STRIP/BLOOD GLUCOSE: CPT

## 2017-05-25 PROCEDURE — 94760 N-INVAS EAR/PLS OXIMETRY 1: CPT

## 2017-05-25 RX ADMIN — IPRATROPIUM BROMIDE AND ALBUTEROL SULFATE 3 ML: 2.5; .5 SOLUTION RESPIRATORY (INHALATION) at 02:33

## 2017-05-25 RX ADMIN — METOPROLOL TARTRATE 25 MG: 25 TABLET ORAL at 17:00

## 2017-05-25 RX ADMIN — IPRATROPIUM BROMIDE AND ALBUTEROL SULFATE 3 ML: 2.5; .5 SOLUTION RESPIRATORY (INHALATION) at 19:56

## 2017-05-25 RX ADMIN — INSULIN LISPRO 1 UNITS: 100 INJECTION, SOLUTION INTRAVENOUS; SUBCUTANEOUS at 14:43

## 2017-05-25 RX ADMIN — IPRATROPIUM BROMIDE AND ALBUTEROL SULFATE 3 ML: 2.5; .5 SOLUTION RESPIRATORY (INHALATION) at 13:24

## 2017-05-25 RX ADMIN — CALCIUM ACETATE 667 MG: 667 CAPSULE ORAL at 16:56

## 2017-05-25 RX ADMIN — PANTOPRAZOLE SODIUM 20 MG: 20 TABLET, DELAYED RELEASE ORAL at 05:29

## 2017-05-25 RX ADMIN — IPRATROPIUM BROMIDE AND ALBUTEROL SULFATE 3 ML: 2.5; .5 SOLUTION RESPIRATORY (INHALATION) at 07:28

## 2017-05-25 RX ADMIN — CARBIDOPA AND LEVODOPA 1 TABLET: 25; 100 TABLET ORAL at 21:59

## 2017-05-25 RX ADMIN — TRAMADOL HYDROCHLORIDE 50 MG: 50 TABLET, FILM COATED ORAL at 16:54

## 2017-05-25 RX ADMIN — INSULIN LISPRO 3 UNITS: 100 INJECTION, SOLUTION INTRAVENOUS; SUBCUTANEOUS at 22:03

## 2017-05-25 RX ADMIN — INSULIN LISPRO 3 UNITS: 100 INJECTION, SOLUTION INTRAVENOUS; SUBCUTANEOUS at 17:06

## 2017-05-25 RX ADMIN — HYDROXYCHLOROQUINE SULFATE 200 MG: 200 TABLET, FILM COATED ORAL at 22:01

## 2017-05-25 RX ADMIN — HEPARIN SODIUM 5000 UNITS: 5000 INJECTION, SOLUTION INTRAVENOUS; SUBCUTANEOUS at 05:29

## 2017-05-25 RX ADMIN — ASPIRIN 325 MG ORAL TABLET 325 MG: 325 PILL ORAL at 09:22

## 2017-05-25 RX ADMIN — HEPARIN SODIUM 5000 UNITS: 5000 INJECTION, SOLUTION INTRAVENOUS; SUBCUTANEOUS at 21:59

## 2017-05-25 RX ADMIN — CARBIDOPA AND LEVODOPA 1 TABLET: 25; 100 TABLET ORAL at 09:22

## 2017-05-25 RX ADMIN — GABAPENTIN 300 MG: 300 CAPSULE ORAL at 22:02

## 2017-05-25 RX ADMIN — CALCIUM ACETATE 667 MG: 667 CAPSULE ORAL at 09:22

## 2017-05-25 RX ADMIN — HEPARIN SODIUM 5000 UNITS: 5000 INJECTION, SOLUTION INTRAVENOUS; SUBCUTANEOUS at 14:05

## 2017-05-25 RX ADMIN — CALCIUM ACETATE 667 MG: 667 CAPSULE ORAL at 13:52

## 2017-05-25 RX ADMIN — LEVOTHYROXINE SODIUM 75 MCG: 75 TABLET ORAL at 21:59

## 2017-05-25 RX ADMIN — INSULIN DETEMIR 10 UNITS: 100 INJECTION, SOLUTION SUBCUTANEOUS at 19:13

## 2017-05-25 RX ADMIN — DAPTOMYCIN 625 MG: 500 INJECTION, POWDER, LYOPHILIZED, FOR SOLUTION INTRAVENOUS at 19:13

## 2017-05-25 RX ADMIN — WARFARIN SODIUM 4 MG: 4 TABLET ORAL at 17:00

## 2017-05-25 RX ADMIN — CARBIDOPA AND LEVODOPA 1 TABLET: 25; 100 TABLET ORAL at 16:56

## 2017-05-26 PROBLEM — L30.8 DERMATITIS ASSOCIATED WITH MOISTURE: Chronic | Status: ACTIVE | Noted: 2017-05-22

## 2017-05-26 PROBLEM — L89.320 PRESSURE ULCER OF LEFT BUTTOCK, UNSTAGEABLE (HCC): Chronic | Status: ACTIVE | Noted: 2017-05-22

## 2017-05-26 LAB
BACTERIA BLD CULT: NORMAL
GLUCOSE SERPL-MCNC: 122 MG/DL (ref 65–140)
GLUCOSE SERPL-MCNC: 145 MG/DL (ref 65–140)
GLUCOSE SERPL-MCNC: 193 MG/DL (ref 65–140)
GLUCOSE SERPL-MCNC: 216 MG/DL (ref 65–140)
GRAM STN SPEC: NORMAL
INR PPP: 2.45 (ref 0.86–1.16)
PROTHROMBIN TIME: 26.5 SECONDS (ref 12.1–14.4)

## 2017-05-26 PROCEDURE — 94640 AIRWAY INHALATION TREATMENT: CPT

## 2017-05-26 PROCEDURE — 82948 REAGENT STRIP/BLOOD GLUCOSE: CPT

## 2017-05-26 PROCEDURE — 92526 ORAL FUNCTION THERAPY: CPT

## 2017-05-26 PROCEDURE — 94760 N-INVAS EAR/PLS OXIMETRY 1: CPT

## 2017-05-26 PROCEDURE — 85610 PROTHROMBIN TIME: CPT | Performed by: INTERNAL MEDICINE

## 2017-05-26 RX ADMIN — PANTOPRAZOLE SODIUM 20 MG: 20 TABLET, DELAYED RELEASE ORAL at 06:09

## 2017-05-26 RX ADMIN — METOPROLOL TARTRATE 25 MG: 25 TABLET ORAL at 17:20

## 2017-05-26 RX ADMIN — IPRATROPIUM BROMIDE AND ALBUTEROL SULFATE 3 ML: 2.5; .5 SOLUTION RESPIRATORY (INHALATION) at 07:34

## 2017-05-26 RX ADMIN — IPRATROPIUM BROMIDE AND ALBUTEROL SULFATE 3 ML: 2.5; .5 SOLUTION RESPIRATORY (INHALATION) at 02:06

## 2017-05-26 RX ADMIN — CALCIUM ACETATE 667 MG: 667 CAPSULE ORAL at 10:02

## 2017-05-26 RX ADMIN — CARBIDOPA AND LEVODOPA 1 TABLET: 25; 100 TABLET ORAL at 17:20

## 2017-05-26 RX ADMIN — HYDROXYCHLOROQUINE SULFATE 200 MG: 200 TABLET, FILM COATED ORAL at 21:43

## 2017-05-26 RX ADMIN — ASPIRIN 325 MG ORAL TABLET 325 MG: 325 PILL ORAL at 10:02

## 2017-05-26 RX ADMIN — IPRATROPIUM BROMIDE AND ALBUTEROL SULFATE 3 ML: 2.5; .5 SOLUTION RESPIRATORY (INHALATION) at 13:25

## 2017-05-26 RX ADMIN — LEVOTHYROXINE SODIUM 75 MCG: 75 TABLET ORAL at 21:42

## 2017-05-26 RX ADMIN — HEPARIN SODIUM 5000 UNITS: 5000 INJECTION, SOLUTION INTRAVENOUS; SUBCUTANEOUS at 21:42

## 2017-05-26 RX ADMIN — CALCIUM ACETATE 667 MG: 667 CAPSULE ORAL at 17:20

## 2017-05-26 RX ADMIN — METOPROLOL TARTRATE 25 MG: 25 TABLET ORAL at 10:02

## 2017-05-26 RX ADMIN — INSULIN LISPRO 2 UNITS: 100 INJECTION, SOLUTION INTRAVENOUS; SUBCUTANEOUS at 12:14

## 2017-05-26 RX ADMIN — GABAPENTIN 300 MG: 300 CAPSULE ORAL at 21:54

## 2017-05-26 RX ADMIN — CARBIDOPA AND LEVODOPA 1 TABLET: 25; 100 TABLET ORAL at 10:02

## 2017-05-26 RX ADMIN — IPRATROPIUM BROMIDE AND ALBUTEROL SULFATE 3 ML: 2.5; .5 SOLUTION RESPIRATORY (INHALATION) at 19:41

## 2017-05-26 RX ADMIN — INSULIN DETEMIR 10 UNITS: 100 INJECTION, SOLUTION SUBCUTANEOUS at 17:20

## 2017-05-26 RX ADMIN — HEPARIN SODIUM 5000 UNITS: 5000 INJECTION, SOLUTION INTRAVENOUS; SUBCUTANEOUS at 05:56

## 2017-05-26 RX ADMIN — CALCIUM ACETATE 667 MG: 667 CAPSULE ORAL at 12:14

## 2017-05-26 RX ADMIN — CARBIDOPA AND LEVODOPA 1 TABLET: 25; 100 TABLET ORAL at 21:42

## 2017-05-26 RX ADMIN — HEPARIN SODIUM 5000 UNITS: 5000 INJECTION, SOLUTION INTRAVENOUS; SUBCUTANEOUS at 13:48

## 2017-05-26 RX ADMIN — INSULIN LISPRO 1 UNITS: 100 INJECTION, SOLUTION INTRAVENOUS; SUBCUTANEOUS at 17:21

## 2017-05-26 RX ADMIN — WARFARIN SODIUM 4 MG: 4 TABLET ORAL at 17:20

## 2017-05-27 ENCOUNTER — APPOINTMENT (INPATIENT)
Dept: DIALYSIS | Facility: HOSPITAL | Age: 77
DRG: 314 | End: 2017-05-27
Attending: INTERNAL MEDICINE
Payer: MEDICARE

## 2017-05-27 LAB
BACTERIA BLD CULT: NORMAL
BACTERIA BLD CULT: NORMAL
BACTERIA CATH TIP CULT: NORMAL
GLUCOSE SERPL-MCNC: 102 MG/DL (ref 65–140)
GLUCOSE SERPL-MCNC: 143 MG/DL (ref 65–140)
GLUCOSE SERPL-MCNC: 187 MG/DL (ref 65–140)
GLUCOSE SERPL-MCNC: 224 MG/DL (ref 65–140)
GRAM STN SPEC: NORMAL
GRAM STN SPEC: NORMAL
INR PPP: 2.57 (ref 0.86–1.16)
PROTHROMBIN TIME: 27.5 SECONDS (ref 12.1–14.4)

## 2017-05-27 PROCEDURE — 85610 PROTHROMBIN TIME: CPT | Performed by: INTERNAL MEDICINE

## 2017-05-27 PROCEDURE — 82948 REAGENT STRIP/BLOOD GLUCOSE: CPT

## 2017-05-27 PROCEDURE — 94760 N-INVAS EAR/PLS OXIMETRY 1: CPT

## 2017-05-27 PROCEDURE — 94640 AIRWAY INHALATION TREATMENT: CPT

## 2017-05-27 RX ADMIN — CALCIUM ACETATE 667 MG: 667 CAPSULE ORAL at 08:56

## 2017-05-27 RX ADMIN — IPRATROPIUM BROMIDE AND ALBUTEROL SULFATE 3 ML: 2.5; .5 SOLUTION RESPIRATORY (INHALATION) at 14:32

## 2017-05-27 RX ADMIN — DAPTOMYCIN 625 MG: 500 INJECTION, POWDER, LYOPHILIZED, FOR SOLUTION INTRAVENOUS at 21:37

## 2017-05-27 RX ADMIN — CARBIDOPA AND LEVODOPA 1 TABLET: 25; 100 TABLET ORAL at 17:26

## 2017-05-27 RX ADMIN — METOPROLOL TARTRATE 25 MG: 25 TABLET ORAL at 08:56

## 2017-05-27 RX ADMIN — CARBIDOPA AND LEVODOPA 1 TABLET: 25; 100 TABLET ORAL at 21:31

## 2017-05-27 RX ADMIN — IPRATROPIUM BROMIDE AND ALBUTEROL SULFATE 3 ML: 2.5; .5 SOLUTION RESPIRATORY (INHALATION) at 02:51

## 2017-05-27 RX ADMIN — INSULIN LISPRO 1 UNITS: 100 INJECTION, SOLUTION INTRAVENOUS; SUBCUTANEOUS at 21:37

## 2017-05-27 RX ADMIN — CALCIUM ACETATE 667 MG: 667 CAPSULE ORAL at 17:27

## 2017-05-27 RX ADMIN — ASPIRIN 325 MG ORAL TABLET 325 MG: 325 PILL ORAL at 08:56

## 2017-05-27 RX ADMIN — IPRATROPIUM BROMIDE AND ALBUTEROL SULFATE 3 ML: 2.5; .5 SOLUTION RESPIRATORY (INHALATION) at 20:55

## 2017-05-27 RX ADMIN — HEPARIN SODIUM 5000 UNITS: 5000 INJECTION, SOLUTION INTRAVENOUS; SUBCUTANEOUS at 05:33

## 2017-05-27 RX ADMIN — WARFARIN SODIUM 4 MG: 4 TABLET ORAL at 17:27

## 2017-05-27 RX ADMIN — HYDROXYCHLOROQUINE SULFATE 200 MG: 200 TABLET, FILM COATED ORAL at 21:38

## 2017-05-27 RX ADMIN — PANTOPRAZOLE SODIUM 20 MG: 20 TABLET, DELAYED RELEASE ORAL at 05:33

## 2017-05-27 RX ADMIN — HEPARIN SODIUM 5000 UNITS: 5000 INJECTION, SOLUTION INTRAVENOUS; SUBCUTANEOUS at 14:26

## 2017-05-27 RX ADMIN — CALCIUM ACETATE 667 MG: 667 CAPSULE ORAL at 12:24

## 2017-05-27 RX ADMIN — HEPARIN SODIUM 5000 UNITS: 5000 INJECTION, SOLUTION INTRAVENOUS; SUBCUTANEOUS at 21:37

## 2017-05-27 RX ADMIN — IPRATROPIUM BROMIDE AND ALBUTEROL SULFATE 3 ML: 2.5; .5 SOLUTION RESPIRATORY (INHALATION) at 08:59

## 2017-05-27 RX ADMIN — LEVOTHYROXINE SODIUM 75 MCG: 75 TABLET ORAL at 21:31

## 2017-05-27 RX ADMIN — INSULIN LISPRO 2 UNITS: 100 INJECTION, SOLUTION INTRAVENOUS; SUBCUTANEOUS at 12:24

## 2017-05-27 RX ADMIN — CARBIDOPA AND LEVODOPA 1 TABLET: 25; 100 TABLET ORAL at 08:56

## 2017-05-27 RX ADMIN — INSULIN DETEMIR 10 UNITS: 100 INJECTION, SOLUTION SUBCUTANEOUS at 17:27

## 2017-05-27 RX ADMIN — GABAPENTIN 300 MG: 300 CAPSULE ORAL at 21:31

## 2017-05-28 LAB
ALBUMIN SERPL BCP-MCNC: 2.7 G/DL (ref 3.5–5)
ALP SERPL-CCNC: 72 U/L (ref 46–116)
ALT SERPL W P-5'-P-CCNC: 6 U/L (ref 12–78)
ANION GAP SERPL CALCULATED.3IONS-SCNC: 4 MMOL/L (ref 4–13)
AST SERPL W P-5'-P-CCNC: 12 U/L (ref 5–45)
BASOPHILS # BLD MANUAL: 0 THOUSAND/UL (ref 0–0.1)
BASOPHILS NFR MAR MANUAL: 0 % (ref 0–1)
BILIRUB SERPL-MCNC: 0.2 MG/DL (ref 0.2–1)
BUN SERPL-MCNC: 9 MG/DL (ref 5–25)
CALCIUM SERPL-MCNC: 8.9 MG/DL (ref 8.3–10.1)
CHLORIDE SERPL-SCNC: 103 MMOL/L (ref 100–108)
CO2 SERPL-SCNC: 33 MMOL/L (ref 21–32)
CREAT SERPL-MCNC: 2.14 MG/DL (ref 0.6–1.3)
EOSINOPHIL # BLD MANUAL: 0.52 THOUSAND/UL (ref 0–0.4)
EOSINOPHIL NFR BLD MANUAL: 5 % (ref 0–6)
ERYTHROCYTE [DISTWIDTH] IN BLOOD BY AUTOMATED COUNT: 17.9 % (ref 11.6–15.1)
GFR SERPL CREATININE-BSD FRML MDRD: 22.4 ML/MIN/1.73SQ M
GLUCOSE SERPL-MCNC: 169 MG/DL (ref 65–140)
GLUCOSE SERPL-MCNC: 178 MG/DL (ref 65–140)
GLUCOSE SERPL-MCNC: 204 MG/DL (ref 65–140)
GLUCOSE SERPL-MCNC: 84 MG/DL (ref 65–140)
GLUCOSE SERPL-MCNC: 97 MG/DL (ref 65–140)
HCT VFR BLD AUTO: 27.4 % (ref 34.8–46.1)
HGB BLD-MCNC: 7.9 G/DL (ref 11.5–15.4)
LYMPHOCYTES # BLD AUTO: 39 % (ref 14–44)
LYMPHOCYTES # BLD AUTO: 4.04 THOUSAND/UL (ref 0.6–4.47)
MCH RBC QN AUTO: 27.9 PG (ref 26.8–34.3)
MCHC RBC AUTO-ENTMCNC: 28.8 G/DL (ref 31.4–37.4)
MCV RBC AUTO: 97 FL (ref 82–98)
METAMYELOCYTES NFR BLD MANUAL: 1 % (ref 0–1)
MONOCYTES # BLD AUTO: 0.41 THOUSAND/UL (ref 0–1.22)
MONOCYTES NFR BLD: 4 % (ref 4–12)
NEUTROPHILS # BLD MANUAL: 4.97 THOUSAND/UL (ref 1.85–7.62)
NEUTS BAND NFR BLD MANUAL: 1 % (ref 0–8)
NEUTS SEG NFR BLD AUTO: 47 % (ref 43–75)
PLATELET # BLD AUTO: 203 THOUSANDS/UL (ref 149–390)
PLATELET BLD QL SMEAR: ADEQUATE
PMV BLD AUTO: 8.7 FL (ref 8.9–12.7)
POTASSIUM SERPL-SCNC: 4.2 MMOL/L (ref 3.5–5.3)
PROT SERPL-MCNC: 7.5 G/DL (ref 6.4–8.2)
RBC # BLD AUTO: 2.83 MILLION/UL (ref 3.81–5.12)
SODIUM SERPL-SCNC: 140 MMOL/L (ref 136–145)
TOTAL CELLS COUNTED SPEC: 100
VARIANT LYMPHS # BLD AUTO: 3 %
WBC # BLD AUTO: 10.35 THOUSAND/UL (ref 4.31–10.16)

## 2017-05-28 PROCEDURE — 94640 AIRWAY INHALATION TREATMENT: CPT

## 2017-05-28 PROCEDURE — 80053 COMPREHEN METABOLIC PANEL: CPT | Performed by: INTERNAL MEDICINE

## 2017-05-28 PROCEDURE — 94760 N-INVAS EAR/PLS OXIMETRY 1: CPT

## 2017-05-28 PROCEDURE — 85007 BL SMEAR W/DIFF WBC COUNT: CPT | Performed by: INTERNAL MEDICINE

## 2017-05-28 PROCEDURE — 85027 COMPLETE CBC AUTOMATED: CPT | Performed by: INTERNAL MEDICINE

## 2017-05-28 PROCEDURE — 82948 REAGENT STRIP/BLOOD GLUCOSE: CPT

## 2017-05-28 RX ADMIN — CALCIUM ACETATE 667 MG: 667 CAPSULE ORAL at 12:02

## 2017-05-28 RX ADMIN — INSULIN LISPRO 2 UNITS: 100 INJECTION, SOLUTION INTRAVENOUS; SUBCUTANEOUS at 21:28

## 2017-05-28 RX ADMIN — HEPARIN SODIUM 5000 UNITS: 5000 INJECTION, SOLUTION INTRAVENOUS; SUBCUTANEOUS at 21:26

## 2017-05-28 RX ADMIN — ASPIRIN 325 MG ORAL TABLET 325 MG: 325 PILL ORAL at 09:31

## 2017-05-28 RX ADMIN — INSULIN LISPRO 1 UNITS: 100 INJECTION, SOLUTION INTRAVENOUS; SUBCUTANEOUS at 17:55

## 2017-05-28 RX ADMIN — HEPARIN SODIUM 5000 UNITS: 5000 INJECTION, SOLUTION INTRAVENOUS; SUBCUTANEOUS at 14:26

## 2017-05-28 RX ADMIN — HYDROXYCHLOROQUINE SULFATE 200 MG: 200 TABLET, FILM COATED ORAL at 21:27

## 2017-05-28 RX ADMIN — METOPROLOL TARTRATE 25 MG: 25 TABLET ORAL at 09:31

## 2017-05-28 RX ADMIN — CARBIDOPA AND LEVODOPA 1 TABLET: 25; 100 TABLET ORAL at 21:26

## 2017-05-28 RX ADMIN — WARFARIN SODIUM 4 MG: 4 TABLET ORAL at 17:55

## 2017-05-28 RX ADMIN — METOPROLOL TARTRATE 25 MG: 25 TABLET ORAL at 17:54

## 2017-05-28 RX ADMIN — INSULIN DETEMIR 10 UNITS: 100 INJECTION, SOLUTION SUBCUTANEOUS at 18:03

## 2017-05-28 RX ADMIN — CALCIUM ACETATE 667 MG: 667 CAPSULE ORAL at 09:31

## 2017-05-28 RX ADMIN — INSULIN LISPRO 1 UNITS: 100 INJECTION, SOLUTION INTRAVENOUS; SUBCUTANEOUS at 12:18

## 2017-05-28 RX ADMIN — CALCIUM ACETATE 667 MG: 667 CAPSULE ORAL at 17:54

## 2017-05-28 RX ADMIN — PANTOPRAZOLE SODIUM 20 MG: 20 TABLET, DELAYED RELEASE ORAL at 05:20

## 2017-05-28 RX ADMIN — IPRATROPIUM BROMIDE AND ALBUTEROL SULFATE 3 ML: 2.5; .5 SOLUTION RESPIRATORY (INHALATION) at 09:00

## 2017-05-28 RX ADMIN — CARBIDOPA AND LEVODOPA 1 TABLET: 25; 100 TABLET ORAL at 09:31

## 2017-05-28 RX ADMIN — LEVOTHYROXINE SODIUM 75 MCG: 75 TABLET ORAL at 21:27

## 2017-05-28 RX ADMIN — HEPARIN SODIUM 5000 UNITS: 5000 INJECTION, SOLUTION INTRAVENOUS; SUBCUTANEOUS at 05:19

## 2017-05-28 RX ADMIN — IPRATROPIUM BROMIDE AND ALBUTEROL SULFATE 3 ML: 2.5; .5 SOLUTION RESPIRATORY (INHALATION) at 14:42

## 2017-05-28 RX ADMIN — GABAPENTIN 300 MG: 300 CAPSULE ORAL at 21:27

## 2017-05-28 RX ADMIN — IPRATROPIUM BROMIDE AND ALBUTEROL SULFATE 3 ML: 2.5; .5 SOLUTION RESPIRATORY (INHALATION) at 20:57

## 2017-05-28 RX ADMIN — CARBIDOPA AND LEVODOPA 1 TABLET: 25; 100 TABLET ORAL at 17:55

## 2017-05-28 RX ADMIN — IPRATROPIUM BROMIDE AND ALBUTEROL SULFATE 3 ML: 2.5; .5 SOLUTION RESPIRATORY (INHALATION) at 02:36

## 2017-05-29 LAB
GLUCOSE SERPL-MCNC: 117 MG/DL (ref 65–140)
GLUCOSE SERPL-MCNC: 197 MG/DL (ref 65–140)
GLUCOSE SERPL-MCNC: 199 MG/DL (ref 65–140)
GLUCOSE SERPL-MCNC: 237 MG/DL (ref 65–140)
INR PPP: 3 (ref 0.86–1.16)
PROTHROMBIN TIME: 31.1 SECONDS (ref 12.1–14.4)

## 2017-05-29 PROCEDURE — 82948 REAGENT STRIP/BLOOD GLUCOSE: CPT

## 2017-05-29 PROCEDURE — 85610 PROTHROMBIN TIME: CPT | Performed by: INTERNAL MEDICINE

## 2017-05-29 PROCEDURE — 94640 AIRWAY INHALATION TREATMENT: CPT

## 2017-05-29 PROCEDURE — 94760 N-INVAS EAR/PLS OXIMETRY 1: CPT

## 2017-05-29 RX ADMIN — INSULIN LISPRO 1 UNITS: 100 INJECTION, SOLUTION INTRAVENOUS; SUBCUTANEOUS at 11:57

## 2017-05-29 RX ADMIN — PANTOPRAZOLE SODIUM 20 MG: 20 TABLET, DELAYED RELEASE ORAL at 05:37

## 2017-05-29 RX ADMIN — IPRATROPIUM BROMIDE AND ALBUTEROL SULFATE 3 ML: 2.5; .5 SOLUTION RESPIRATORY (INHALATION) at 15:18

## 2017-05-29 RX ADMIN — CALCIUM ACETATE 667 MG: 667 CAPSULE ORAL at 11:56

## 2017-05-29 RX ADMIN — DAPTOMYCIN 625 MG: 500 INJECTION, POWDER, LYOPHILIZED, FOR SOLUTION INTRAVENOUS at 17:27

## 2017-05-29 RX ADMIN — HEPARIN SODIUM 5000 UNITS: 5000 INJECTION, SOLUTION INTRAVENOUS; SUBCUTANEOUS at 13:56

## 2017-05-29 RX ADMIN — IPRATROPIUM BROMIDE AND ALBUTEROL SULFATE 3 ML: 2.5; .5 SOLUTION RESPIRATORY (INHALATION) at 21:26

## 2017-05-29 RX ADMIN — IPRATROPIUM BROMIDE AND ALBUTEROL SULFATE 3 ML: 2.5; .5 SOLUTION RESPIRATORY (INHALATION) at 02:56

## 2017-05-29 RX ADMIN — GABAPENTIN 300 MG: 300 CAPSULE ORAL at 21:15

## 2017-05-29 RX ADMIN — CALCIUM ACETATE 667 MG: 667 CAPSULE ORAL at 17:19

## 2017-05-29 RX ADMIN — HYDROXYCHLOROQUINE SULFATE 200 MG: 200 TABLET, FILM COATED ORAL at 21:16

## 2017-05-29 RX ADMIN — CARBIDOPA AND LEVODOPA 1 TABLET: 25; 100 TABLET ORAL at 21:15

## 2017-05-29 RX ADMIN — METOPROLOL TARTRATE 25 MG: 25 TABLET ORAL at 09:20

## 2017-05-29 RX ADMIN — HEPARIN SODIUM 5000 UNITS: 5000 INJECTION, SOLUTION INTRAVENOUS; SUBCUTANEOUS at 21:15

## 2017-05-29 RX ADMIN — INSULIN LISPRO 3 UNITS: 100 INJECTION, SOLUTION INTRAVENOUS; SUBCUTANEOUS at 17:20

## 2017-05-29 RX ADMIN — WARFARIN SODIUM 4 MG: 4 TABLET ORAL at 17:19

## 2017-05-29 RX ADMIN — CARBIDOPA AND LEVODOPA 1 TABLET: 25; 100 TABLET ORAL at 09:20

## 2017-05-29 RX ADMIN — LEVOTHYROXINE SODIUM 75 MCG: 75 TABLET ORAL at 21:15

## 2017-05-29 RX ADMIN — HEPARIN SODIUM 5000 UNITS: 5000 INJECTION, SOLUTION INTRAVENOUS; SUBCUTANEOUS at 05:37

## 2017-05-29 RX ADMIN — IPRATROPIUM BROMIDE AND ALBUTEROL SULFATE 3 ML: 2.5; .5 SOLUTION RESPIRATORY (INHALATION) at 09:36

## 2017-05-29 RX ADMIN — CARBIDOPA AND LEVODOPA 1 TABLET: 25; 100 TABLET ORAL at 17:19

## 2017-05-29 RX ADMIN — ASPIRIN 325 MG ORAL TABLET 325 MG: 325 PILL ORAL at 09:20

## 2017-05-29 RX ADMIN — INSULIN DETEMIR 10 UNITS: 100 INJECTION, SOLUTION SUBCUTANEOUS at 17:18

## 2017-05-29 RX ADMIN — INSULIN LISPRO 2 UNITS: 100 INJECTION, SOLUTION INTRAVENOUS; SUBCUTANEOUS at 21:19

## 2017-05-29 RX ADMIN — METOPROLOL TARTRATE 25 MG: 25 TABLET ORAL at 17:18

## 2017-05-29 RX ADMIN — CALCIUM ACETATE 667 MG: 667 CAPSULE ORAL at 09:20

## 2017-05-30 ENCOUNTER — HOSPITAL ENCOUNTER (OUTPATIENT)
Dept: NON INVASIVE DIAGNOSTICS | Facility: HOSPITAL | Age: 77
Discharge: HOME | DRG: 314 | End: 2017-05-30
Attending: INTERNAL MEDICINE
Payer: MEDICARE

## 2017-05-30 ENCOUNTER — ANESTHESIA EVENT (INPATIENT)
Dept: NON INVASIVE DIAGNOSTICS | Facility: HOSPITAL | Age: 77
DRG: 314 | End: 2017-05-30
Payer: MEDICARE

## 2017-05-30 ENCOUNTER — APPOINTMENT (INPATIENT)
Dept: DIALYSIS | Facility: HOSPITAL | Age: 77
DRG: 314 | End: 2017-05-30
Attending: INTERNAL MEDICINE
Payer: MEDICARE

## 2017-05-30 PROBLEM — G93.41 ACUTE METABOLIC ENCEPHALOPATHY: Status: ACTIVE | Noted: 2017-05-30

## 2017-05-30 PROBLEM — I50.32 CHRONIC DIASTOLIC CONGESTIVE HEART FAILURE (HCC): Status: ACTIVE | Noted: 2017-05-30

## 2017-05-30 LAB
ANION GAP SERPL CALCULATED.3IONS-SCNC: 7 MMOL/L (ref 4–13)
BACTERIA BLD CULT: NORMAL
BACTERIA BLD CULT: NORMAL
BUN SERPL-MCNC: 24 MG/DL (ref 5–25)
CALCIUM SERPL-MCNC: 8.8 MG/DL (ref 8.3–10.1)
CHLORIDE SERPL-SCNC: 102 MMOL/L (ref 100–108)
CO2 SERPL-SCNC: 32 MMOL/L (ref 21–32)
CREAT SERPL-MCNC: 4.2 MG/DL (ref 0.6–1.3)
ERYTHROCYTE [DISTWIDTH] IN BLOOD BY AUTOMATED COUNT: 17.6 % (ref 11.6–15.1)
GFR SERPL CREATININE-BSD FRML MDRD: 10.3 ML/MIN/1.73SQ M
GLUCOSE SERPL-MCNC: 132 MG/DL (ref 65–140)
GLUCOSE SERPL-MCNC: 134 MG/DL (ref 65–140)
GLUCOSE SERPL-MCNC: 173 MG/DL (ref 65–140)
GLUCOSE SERPL-MCNC: 96 MG/DL (ref 65–140)
GLUCOSE SERPL-MCNC: 98 MG/DL (ref 65–140)
HCT VFR BLD AUTO: 25.7 % (ref 34.8–46.1)
HGB BLD-MCNC: 7.4 G/DL (ref 11.5–15.4)
MCH RBC QN AUTO: 28 PG (ref 26.8–34.3)
MCHC RBC AUTO-ENTMCNC: 28.8 G/DL (ref 31.4–37.4)
MCV RBC AUTO: 97 FL (ref 82–98)
PLATELET # BLD AUTO: 179 THOUSANDS/UL (ref 149–390)
PMV BLD AUTO: 9.1 FL (ref 8.9–12.7)
POTASSIUM SERPL-SCNC: 5.3 MMOL/L (ref 3.5–5.3)
RBC # BLD AUTO: 2.64 MILLION/UL (ref 3.81–5.12)
SODIUM SERPL-SCNC: 141 MMOL/L (ref 136–145)
WBC # BLD AUTO: 10.83 THOUSAND/UL (ref 4.31–10.16)

## 2017-05-30 PROCEDURE — 82948 REAGENT STRIP/BLOOD GLUCOSE: CPT

## 2017-05-30 PROCEDURE — 85027 COMPLETE CBC AUTOMATED: CPT | Performed by: INTERNAL MEDICINE

## 2017-05-30 PROCEDURE — 80048 BASIC METABOLIC PNL TOTAL CA: CPT | Performed by: PHYSICIAN ASSISTANT

## 2017-05-30 PROCEDURE — 94760 N-INVAS EAR/PLS OXIMETRY 1: CPT

## 2017-05-30 PROCEDURE — 94640 AIRWAY INHALATION TREATMENT: CPT

## 2017-05-30 RX ORDER — SODIUM CHLORIDE 9 MG/ML
INJECTION, SOLUTION INTRAVENOUS CONTINUOUS PRN
Status: DISCONTINUED | OUTPATIENT
Start: 2017-05-30 | End: 2017-06-12 | Stop reason: HOSPADM

## 2017-05-30 RX ADMIN — LEVOTHYROXINE SODIUM 75 MCG: 75 TABLET ORAL at 21:40

## 2017-05-30 RX ADMIN — INSULIN DETEMIR 10 UNITS: 100 INJECTION, SOLUTION SUBCUTANEOUS at 17:33

## 2017-05-30 RX ADMIN — GABAPENTIN 300 MG: 300 CAPSULE ORAL at 21:40

## 2017-05-30 RX ADMIN — HYDROXYCHLOROQUINE SULFATE 200 MG: 200 TABLET, FILM COATED ORAL at 21:40

## 2017-05-30 RX ADMIN — CARBIDOPA AND LEVODOPA 1 TABLET: 25; 100 TABLET ORAL at 21:40

## 2017-05-30 RX ADMIN — HEPARIN SODIUM 5000 UNITS: 5000 INJECTION, SOLUTION INTRAVENOUS; SUBCUTANEOUS at 16:27

## 2017-05-30 RX ADMIN — CALCIUM ACETATE 667 MG: 667 CAPSULE ORAL at 16:27

## 2017-05-30 RX ADMIN — METOPROLOL TARTRATE 25 MG: 25 TABLET ORAL at 17:33

## 2017-05-30 RX ADMIN — IPRATROPIUM BROMIDE AND ALBUTEROL SULFATE 3 ML: 2.5; .5 SOLUTION RESPIRATORY (INHALATION) at 07:40

## 2017-05-30 RX ADMIN — TRAMADOL HYDROCHLORIDE 50 MG: 50 TABLET, FILM COATED ORAL at 21:39

## 2017-05-30 RX ADMIN — CARBIDOPA AND LEVODOPA 1 TABLET: 25; 100 TABLET ORAL at 16:27

## 2017-05-30 RX ADMIN — ASPIRIN 325 MG ORAL TABLET 325 MG: 325 PILL ORAL at 16:27

## 2017-05-30 RX ADMIN — IPRATROPIUM BROMIDE AND ALBUTEROL SULFATE 3 ML: 2.5; .5 SOLUTION RESPIRATORY (INHALATION) at 20:43

## 2017-05-30 RX ADMIN — SODIUM CHLORIDE: 0.9 INJECTION, SOLUTION INTRAVENOUS at 14:30

## 2017-05-30 RX ADMIN — IPRATROPIUM BROMIDE AND ALBUTEROL SULFATE 3 ML: 2.5; .5 SOLUTION RESPIRATORY (INHALATION) at 02:21

## 2017-05-30 RX ADMIN — IPRATROPIUM BROMIDE AND ALBUTEROL SULFATE 3 ML: 2.5; .5 SOLUTION RESPIRATORY (INHALATION) at 13:47

## 2017-05-30 RX ADMIN — HEPARIN SODIUM 5000 UNITS: 5000 INJECTION, SOLUTION INTRAVENOUS; SUBCUTANEOUS at 21:40

## 2017-05-30 RX ADMIN — WARFARIN SODIUM 4 MG: 4 TABLET ORAL at 17:34

## 2017-05-30 RX ADMIN — INSULIN LISPRO 1 UNITS: 100 INJECTION, SOLUTION INTRAVENOUS; SUBCUTANEOUS at 21:40

## 2017-05-31 ENCOUNTER — ANESTHESIA (INPATIENT)
Dept: NON INVASIVE DIAGNOSTICS | Facility: HOSPITAL | Age: 77
DRG: 314 | End: 2017-05-31
Payer: MEDICARE

## 2017-05-31 ENCOUNTER — APPOINTMENT (INPATIENT)
Dept: NON INVASIVE DIAGNOSTICS | Facility: HOSPITAL | Age: 77
DRG: 314 | End: 2017-05-31
Attending: INTERNAL MEDICINE
Payer: MEDICARE

## 2017-05-31 LAB
ANION GAP SERPL CALCULATED.3IONS-SCNC: 3 MMOL/L (ref 4–13)
BUN SERPL-MCNC: 16 MG/DL (ref 5–25)
CALCIUM SERPL-MCNC: 8.5 MG/DL (ref 8.3–10.1)
CHLORIDE SERPL-SCNC: 101 MMOL/L (ref 100–108)
CK SERPL-CCNC: 48 U/L (ref 26–192)
CO2 SERPL-SCNC: 35 MMOL/L (ref 21–32)
CREAT SERPL-MCNC: 2.46 MG/DL (ref 0.6–1.3)
ERYTHROCYTE [DISTWIDTH] IN BLOOD BY AUTOMATED COUNT: 17.8 % (ref 11.6–15.1)
GFR SERPL CREATININE-BSD FRML MDRD: 19.1 ML/MIN/1.73SQ M
GLUCOSE SERPL-MCNC: 123 MG/DL (ref 65–140)
GLUCOSE SERPL-MCNC: 171 MG/DL (ref 65–140)
GLUCOSE SERPL-MCNC: 78 MG/DL (ref 65–140)
GLUCOSE SERPL-MCNC: 84 MG/DL (ref 65–140)
GLUCOSE SERPL-MCNC: 95 MG/DL (ref 65–140)
HCT VFR BLD AUTO: 26 % (ref 34.8–46.1)
HGB BLD-MCNC: 7.5 G/DL (ref 11.5–15.4)
INR PPP: 2.49 (ref 0.86–1.16)
MCH RBC QN AUTO: 27.8 PG (ref 26.8–34.3)
MCHC RBC AUTO-ENTMCNC: 28.8 G/DL (ref 31.4–37.4)
MCV RBC AUTO: 96 FL (ref 82–98)
PHOSPHATE SERPL-MCNC: 2.8 MG/DL (ref 2.3–4.1)
PLATELET # BLD AUTO: 196 THOUSANDS/UL (ref 149–390)
PMV BLD AUTO: 9.2 FL (ref 8.9–12.7)
POTASSIUM SERPL-SCNC: 4.3 MMOL/L (ref 3.5–5.3)
PROTHROMBIN TIME: 26.8 SECONDS (ref 12.1–14.4)
RBC # BLD AUTO: 2.7 MILLION/UL (ref 3.81–5.12)
SODIUM SERPL-SCNC: 139 MMOL/L (ref 136–145)
WBC # BLD AUTO: 14.61 THOUSAND/UL (ref 4.31–10.16)

## 2017-05-31 PROCEDURE — 82550 ASSAY OF CK (CPK): CPT | Performed by: INTERNAL MEDICINE

## 2017-05-31 PROCEDURE — 94760 N-INVAS EAR/PLS OXIMETRY 1: CPT

## 2017-05-31 PROCEDURE — 85027 COMPLETE CBC AUTOMATED: CPT | Performed by: INTERNAL MEDICINE

## 2017-05-31 PROCEDURE — 84100 ASSAY OF PHOSPHORUS: CPT | Performed by: INTERNAL MEDICINE

## 2017-05-31 PROCEDURE — 80048 BASIC METABOLIC PNL TOTAL CA: CPT | Performed by: INTERNAL MEDICINE

## 2017-05-31 PROCEDURE — 85610 PROTHROMBIN TIME: CPT | Performed by: INTERNAL MEDICINE

## 2017-05-31 PROCEDURE — 82948 REAGENT STRIP/BLOOD GLUCOSE: CPT

## 2017-05-31 PROCEDURE — 94640 AIRWAY INHALATION TREATMENT: CPT

## 2017-05-31 PROCEDURE — 93312 ECHO TRANSESOPHAGEAL: CPT

## 2017-05-31 RX ORDER — PROPOFOL 10 MG/ML
INJECTION, EMULSION INTRAVENOUS AS NEEDED
Status: DISCONTINUED | OUTPATIENT
Start: 2017-05-31 | End: 2017-05-31 | Stop reason: SURG

## 2017-05-31 RX ADMIN — IPRATROPIUM BROMIDE AND ALBUTEROL SULFATE 3 ML: 2.5; .5 SOLUTION RESPIRATORY (INHALATION) at 02:17

## 2017-05-31 RX ADMIN — CALCIUM ACETATE 667 MG: 667 CAPSULE ORAL at 16:53

## 2017-05-31 RX ADMIN — TRAMADOL HYDROCHLORIDE 50 MG: 50 TABLET, FILM COATED ORAL at 20:45

## 2017-05-31 RX ADMIN — GABAPENTIN 300 MG: 300 CAPSULE ORAL at 21:00

## 2017-05-31 RX ADMIN — ASPIRIN 325 MG ORAL TABLET 325 MG: 325 PILL ORAL at 15:45

## 2017-05-31 RX ADMIN — PROPOFOL 40 MG: 10 INJECTION, EMULSION INTRAVENOUS at 14:20

## 2017-05-31 RX ADMIN — IPRATROPIUM BROMIDE AND ALBUTEROL SULFATE 3 ML: 2.5; .5 SOLUTION RESPIRATORY (INHALATION) at 14:30

## 2017-05-31 RX ADMIN — PROPOFOL 20 MG: 10 INJECTION, EMULSION INTRAVENOUS at 14:10

## 2017-05-31 RX ADMIN — HEPARIN SODIUM 5000 UNITS: 5000 INJECTION, SOLUTION INTRAVENOUS; SUBCUTANEOUS at 21:00

## 2017-05-31 RX ADMIN — LEVOTHYROXINE SODIUM 75 MCG: 75 TABLET ORAL at 21:00

## 2017-05-31 RX ADMIN — HYDROXYCHLOROQUINE SULFATE 200 MG: 200 TABLET, FILM COATED ORAL at 21:00

## 2017-05-31 RX ADMIN — INSULIN LISPRO 1 UNITS: 100 INJECTION, SOLUTION INTRAVENOUS; SUBCUTANEOUS at 22:56

## 2017-05-31 RX ADMIN — SODIUM CHLORIDE: 0.9 INJECTION, SOLUTION INTRAVENOUS at 13:46

## 2017-05-31 RX ADMIN — METOPROLOL TARTRATE 25 MG: 25 TABLET ORAL at 17:00

## 2017-05-31 RX ADMIN — DAPTOMYCIN 625 MG: 500 INJECTION, POWDER, LYOPHILIZED, FOR SOLUTION INTRAVENOUS at 16:57

## 2017-05-31 RX ADMIN — INSULIN DETEMIR 10 UNITS: 100 INJECTION, SOLUTION SUBCUTANEOUS at 17:01

## 2017-05-31 RX ADMIN — IPRATROPIUM BROMIDE AND ALBUTEROL SULFATE 3 ML: 2.5; .5 SOLUTION RESPIRATORY (INHALATION) at 07:23

## 2017-05-31 RX ADMIN — HEPARIN SODIUM 5000 UNITS: 5000 INJECTION, SOLUTION INTRAVENOUS; SUBCUTANEOUS at 15:46

## 2017-05-31 RX ADMIN — HEPARIN SODIUM 5000 UNITS: 5000 INJECTION, SOLUTION INTRAVENOUS; SUBCUTANEOUS at 06:08

## 2017-05-31 RX ADMIN — CARBIDOPA AND LEVODOPA 1 TABLET: 25; 100 TABLET ORAL at 20:45

## 2017-05-31 RX ADMIN — PROPOFOL 60 MG: 10 INJECTION, EMULSION INTRAVENOUS at 14:25

## 2017-05-31 RX ADMIN — TRAMADOL HYDROCHLORIDE 50 MG: 50 TABLET, FILM COATED ORAL at 06:20

## 2017-05-31 RX ADMIN — PROPOFOL 60 MG: 10 INJECTION, EMULSION INTRAVENOUS at 14:06

## 2017-05-31 RX ADMIN — PANTOPRAZOLE SODIUM 20 MG: 20 TABLET, DELAYED RELEASE ORAL at 06:08

## 2017-05-31 RX ADMIN — WARFARIN SODIUM 4 MG: 4 TABLET ORAL at 17:00

## 2017-05-31 RX ADMIN — CARBIDOPA AND LEVODOPA 1 TABLET: 25; 100 TABLET ORAL at 15:45

## 2017-06-01 ENCOUNTER — APPOINTMENT (INPATIENT)
Dept: DIALYSIS | Facility: HOSPITAL | Age: 77
DRG: 314 | End: 2017-06-01
Attending: INTERNAL MEDICINE
Payer: MEDICARE

## 2017-06-01 ENCOUNTER — APPOINTMENT (INPATIENT)
Dept: SPEECH THERAPY | Facility: HOSPITAL | Age: 77
DRG: 314 | End: 2017-06-01
Payer: MEDICARE

## 2017-06-01 ENCOUNTER — HOSPITAL ENCOUNTER (INPATIENT)
Facility: HOSPITAL | Age: 77
LOS: 8 days | Discharge: NURSING, REHAB OR LONG-TERM CARE | DRG: 314 | End: 2017-06-09
Attending: INTERNAL MEDICINE | Admitting: INTERNAL MEDICINE
Payer: MEDICARE

## 2017-06-01 VITALS
HEIGHT: 65 IN | BODY MASS INDEX: 26.04 KG/M2 | RESPIRATION RATE: 20 BRPM | TEMPERATURE: 97.9 F | SYSTOLIC BLOOD PRESSURE: 126 MMHG | WEIGHT: 156.31 LBS | HEART RATE: 77 BPM | DIASTOLIC BLOOD PRESSURE: 52 MMHG | OXYGEN SATURATION: 96 %

## 2017-06-01 DIAGNOSIS — N18.6 ESRD (END STAGE RENAL DISEASE) ON DIALYSIS (HCC): ICD-10-CM

## 2017-06-01 DIAGNOSIS — Z99.2 ESRD (END STAGE RENAL DISEASE) ON DIALYSIS (HCC): ICD-10-CM

## 2017-06-01 DIAGNOSIS — I33.0 INFECTIOUS ENDOCARDITIS: Primary | ICD-10-CM

## 2017-06-01 DIAGNOSIS — Z95.0 PACEMAKER: Chronic | ICD-10-CM

## 2017-06-01 LAB
ANION GAP SERPL CALCULATED.3IONS-SCNC: 6 MMOL/L (ref 4–13)
ANISOCYTOSIS BLD QL SMEAR: PRESENT
ARTERIAL PATENCY WRIST A: YES
BASE EXCESS BLDA CALC-SCNC: 5.7 MMOL/L
BASO STIPL BLD QL SMEAR: PRESENT
BASOPHILS # BLD MANUAL: 0 THOUSAND/UL (ref 0–0.1)
BASOPHILS NFR MAR MANUAL: 0 % (ref 0–1)
BUN SERPL-MCNC: 22 MG/DL (ref 5–25)
CALCIUM SERPL-MCNC: 8.3 MG/DL (ref 8.3–10.1)
CHLORIDE SERPL-SCNC: 103 MMOL/L (ref 100–108)
CO2 SERPL-SCNC: 32 MMOL/L (ref 21–32)
CREAT SERPL-MCNC: 3.59 MG/DL (ref 0.6–1.3)
EOSINOPHIL # BLD MANUAL: 0.54 THOUSAND/UL (ref 0–0.4)
EOSINOPHIL NFR BLD MANUAL: 4 % (ref 0–6)
ERYTHROCYTE [DISTWIDTH] IN BLOOD BY AUTOMATED COUNT: 17.7 % (ref 11.6–15.1)
GFR SERPL CREATININE-BSD FRML MDRD: 12.3 ML/MIN/1.73SQ M
GLUCOSE SERPL-MCNC: 127 MG/DL (ref 65–140)
GLUCOSE SERPL-MCNC: 166 MG/DL (ref 65–140)
GLUCOSE SERPL-MCNC: 70 MG/DL (ref 65–140)
GLUCOSE SERPL-MCNC: 81 MG/DL (ref 65–140)
GLUCOSE SERPL-MCNC: 99 MG/DL (ref 65–140)
HCO3 BLDA-SCNC: 29.6 MMOL/L (ref 22–28)
HCT VFR BLD AUTO: 25.8 % (ref 34.8–46.1)
HGB BLD-MCNC: 7.4 G/DL (ref 11.5–15.4)
INR PPP: 2.61 (ref 0.86–1.16)
LYMPHOCYTES # BLD AUTO: 46 % (ref 14–44)
LYMPHOCYTES # BLD AUTO: 6.23 THOUSAND/UL (ref 0.6–4.47)
MACROCYTES BLD QL AUTO: PRESENT
MAGNESIUM SERPL-MCNC: 2.1 MG/DL (ref 1.6–2.6)
MCH RBC QN AUTO: 27.6 PG (ref 26.8–34.3)
MCHC RBC AUTO-ENTMCNC: 28.7 G/DL (ref 31.4–37.4)
MCV RBC AUTO: 96 FL (ref 82–98)
MICROCYTES BLD QL AUTO: PRESENT
MONOCYTES # BLD AUTO: 0.68 THOUSAND/UL (ref 0–1.22)
MONOCYTES NFR BLD: 5 % (ref 4–12)
MYELOCYTES NFR BLD MANUAL: 1 % (ref 0–1)
NEUTROPHILS # BLD MANUAL: 5.96 THOUSAND/UL (ref 1.85–7.62)
NEUTS SEG NFR BLD AUTO: 44 % (ref 43–75)
NON VENT ROOM AIR: 21 %
O2 CT BLDA-SCNC: 11.1 ML/DL (ref 16–23)
OXYHGB MFR BLDA: 93 % (ref 94–97)
PCO2 BLDA: 40.6 MM HG (ref 36–44)
PH BLDA: 7.48 [PH] (ref 7.35–7.45)
PHOSPHATE SERPL-MCNC: 4.3 MG/DL (ref 2.3–4.1)
PLATELET # BLD AUTO: 205 THOUSANDS/UL (ref 149–390)
PLATELET BLD QL SMEAR: ADEQUATE
PMV BLD AUTO: 9.3 FL (ref 8.9–12.7)
PO2 BLDA: 73.8 MM HG (ref 75–129)
POLYCHROMASIA BLD QL SMEAR: PRESENT
POTASSIUM SERPL-SCNC: 4.8 MMOL/L (ref 3.5–5.3)
PROTHROMBIN TIME: 27.8 SECONDS (ref 12.1–14.4)
RBC # BLD AUTO: 2.68 MILLION/UL (ref 3.81–5.12)
RBC MORPH BLD: PRESENT
SODIUM SERPL-SCNC: 141 MMOL/L (ref 136–145)
SPECIMEN SOURCE: ABNORMAL
TOTAL CELLS COUNTED SPEC: 100
WBC # BLD AUTO: 13.55 THOUSAND/UL (ref 4.31–10.16)

## 2017-06-01 PROCEDURE — 82948 REAGENT STRIP/BLOOD GLUCOSE: CPT

## 2017-06-01 PROCEDURE — 80048 BASIC METABOLIC PNL TOTAL CA: CPT | Performed by: INTERNAL MEDICINE

## 2017-06-01 PROCEDURE — 36600 WITHDRAWAL OF ARTERIAL BLOOD: CPT

## 2017-06-01 PROCEDURE — 94760 N-INVAS EAR/PLS OXIMETRY 1: CPT

## 2017-06-01 PROCEDURE — 83735 ASSAY OF MAGNESIUM: CPT | Performed by: INTERNAL MEDICINE

## 2017-06-01 PROCEDURE — 94640 AIRWAY INHALATION TREATMENT: CPT

## 2017-06-01 PROCEDURE — 82805 BLOOD GASES W/O2 SATURATION: CPT | Performed by: INTERNAL MEDICINE

## 2017-06-01 PROCEDURE — 85610 PROTHROMBIN TIME: CPT | Performed by: INTERNAL MEDICINE

## 2017-06-01 PROCEDURE — 85027 COMPLETE CBC AUTOMATED: CPT | Performed by: INTERNAL MEDICINE

## 2017-06-01 PROCEDURE — 92526 ORAL FUNCTION THERAPY: CPT

## 2017-06-01 PROCEDURE — 85007 BL SMEAR W/DIFF WBC COUNT: CPT | Performed by: INTERNAL MEDICINE

## 2017-06-01 PROCEDURE — 84100 ASSAY OF PHOSPHORUS: CPT | Performed by: INTERNAL MEDICINE

## 2017-06-01 RX ORDER — PANTOPRAZOLE SODIUM 20 MG/1
20 TABLET, DELAYED RELEASE ORAL
Status: CANCELLED | OUTPATIENT
Start: 2017-06-02

## 2017-06-01 RX ORDER — GABAPENTIN 100 MG/1
200 CAPSULE ORAL
Status: DISCONTINUED | OUTPATIENT
Start: 2017-06-01 | End: 2017-06-09 | Stop reason: HOSPADM

## 2017-06-01 RX ORDER — ONDANSETRON 2 MG/ML
4 INJECTION INTRAMUSCULAR; INTRAVENOUS EVERY 6 HOURS PRN
Status: CANCELLED | OUTPATIENT
Start: 2017-06-01

## 2017-06-01 RX ORDER — LEVOTHYROXINE SODIUM 0.07 MG/1
75 TABLET ORAL
Status: DISCONTINUED | OUTPATIENT
Start: 2017-06-01 | End: 2017-06-09 | Stop reason: HOSPADM

## 2017-06-01 RX ORDER — WARFARIN SODIUM 1 MG/1
4 TABLET ORAL
Status: DISCONTINUED | OUTPATIENT
Start: 2017-06-01 | End: 2017-06-09 | Stop reason: HOSPADM

## 2017-06-01 RX ORDER — BISACODYL 10 MG
10 SUPPOSITORY, RECTAL RECTAL DAILY PRN
Status: CANCELLED | OUTPATIENT
Start: 2017-06-01

## 2017-06-01 RX ORDER — HYDROXYCHLOROQUINE SULFATE 200 MG/1
200 TABLET, FILM COATED ORAL
Status: DISCONTINUED | OUTPATIENT
Start: 2017-06-01 | End: 2017-06-09 | Stop reason: HOSPADM

## 2017-06-01 RX ORDER — ACETAMINOPHEN 325 MG/1
650 TABLET ORAL EVERY 6 HOURS PRN
Status: DISCONTINUED | OUTPATIENT
Start: 2017-06-01 | End: 2017-06-09 | Stop reason: HOSPADM

## 2017-06-01 RX ORDER — TRAMADOL HYDROCHLORIDE 50 MG/1
50 TABLET ORAL EVERY 12 HOURS PRN
Status: CANCELLED | OUTPATIENT
Start: 2017-06-01

## 2017-06-01 RX ORDER — LOPERAMIDE HYDROCHLORIDE 2 MG/1
2 CAPSULE ORAL 4 TIMES DAILY PRN
Status: CANCELLED | OUTPATIENT
Start: 2017-06-01

## 2017-06-01 RX ORDER — LOPERAMIDE HYDROCHLORIDE 2 MG/1
2 CAPSULE ORAL 4 TIMES DAILY PRN
Status: DISCONTINUED | OUTPATIENT
Start: 2017-06-01 | End: 2017-06-09 | Stop reason: HOSPADM

## 2017-06-01 RX ORDER — LEVOTHYROXINE SODIUM 0.07 MG/1
75 TABLET ORAL
Status: CANCELLED | OUTPATIENT
Start: 2017-06-01

## 2017-06-01 RX ORDER — TRAMADOL HYDROCHLORIDE 50 MG/1
50 TABLET ORAL EVERY 12 HOURS PRN
Status: DISCONTINUED | OUTPATIENT
Start: 2017-06-01 | End: 2017-06-05

## 2017-06-01 RX ORDER — ASPIRIN 325 MG
325 TABLET ORAL DAILY
Status: DISCONTINUED | OUTPATIENT
Start: 2017-06-02 | End: 2017-06-09 | Stop reason: HOSPADM

## 2017-06-01 RX ORDER — IPRATROPIUM BROMIDE AND ALBUTEROL SULFATE 2.5; .5 MG/3ML; MG/3ML
3 SOLUTION RESPIRATORY (INHALATION)
Status: DISCONTINUED | OUTPATIENT
Start: 2017-06-01 | End: 2017-06-01

## 2017-06-01 RX ORDER — ACETAMINOPHEN 325 MG/1
650 TABLET ORAL EVERY 6 HOURS PRN
Status: CANCELLED | OUTPATIENT
Start: 2017-06-01

## 2017-06-01 RX ORDER — ASPIRIN 325 MG
325 TABLET ORAL DAILY
Status: CANCELLED | OUTPATIENT
Start: 2017-06-02

## 2017-06-01 RX ORDER — ALBUTEROL SULFATE 2.5 MG/3ML
2.5 SOLUTION RESPIRATORY (INHALATION) EVERY 4 HOURS PRN
Status: DISCONTINUED | OUTPATIENT
Start: 2017-06-01 | End: 2017-06-03

## 2017-06-01 RX ORDER — IPRATROPIUM BROMIDE AND ALBUTEROL SULFATE 2.5; .5 MG/3ML; MG/3ML
3 SOLUTION RESPIRATORY (INHALATION)
Status: CANCELLED | OUTPATIENT
Start: 2017-06-01

## 2017-06-01 RX ORDER — PANTOPRAZOLE SODIUM 20 MG/1
20 TABLET, DELAYED RELEASE ORAL
Status: DISCONTINUED | OUTPATIENT
Start: 2017-06-02 | End: 2017-06-09

## 2017-06-01 RX ORDER — HYDROXYCHLOROQUINE SULFATE 200 MG/1
200 TABLET, FILM COATED ORAL
Status: CANCELLED | OUTPATIENT
Start: 2017-06-01

## 2017-06-01 RX ORDER — BISACODYL 10 MG
10 SUPPOSITORY, RECTAL RECTAL DAILY PRN
Status: DISCONTINUED | OUTPATIENT
Start: 2017-06-01 | End: 2017-06-09 | Stop reason: HOSPADM

## 2017-06-01 RX ORDER — ALBUTEROL SULFATE 2.5 MG/3ML
2.5 SOLUTION RESPIRATORY (INHALATION) EVERY 4 HOURS PRN
Status: CANCELLED | OUTPATIENT
Start: 2017-06-01

## 2017-06-01 RX ORDER — ONDANSETRON 2 MG/ML
4 INJECTION INTRAMUSCULAR; INTRAVENOUS EVERY 6 HOURS PRN
Status: DISCONTINUED | OUTPATIENT
Start: 2017-06-01 | End: 2017-06-09 | Stop reason: HOSPADM

## 2017-06-01 RX ORDER — GABAPENTIN 100 MG/1
200 CAPSULE ORAL
Status: CANCELLED | OUTPATIENT
Start: 2017-06-01

## 2017-06-01 RX ORDER — WARFARIN SODIUM 4 MG/1
4 TABLET ORAL
Status: CANCELLED | OUTPATIENT
Start: 2017-06-01

## 2017-06-01 RX ADMIN — CARBIDOPA AND LEVODOPA 1 TABLET: 25; 100 TABLET ORAL at 21:13

## 2017-06-01 RX ADMIN — INSULIN DETEMIR 10 UNITS: 100 INJECTION, SOLUTION SUBCUTANEOUS at 17:46

## 2017-06-01 RX ADMIN — ASPIRIN 325 MG ORAL TABLET 325 MG: 325 PILL ORAL at 08:25

## 2017-06-01 RX ADMIN — CALCIUM ACETATE 667 MG: 667 CAPSULE ORAL at 17:47

## 2017-06-01 RX ADMIN — INSULIN LISPRO 3 UNITS: 100 INJECTION, SOLUTION INTRAVENOUS; SUBCUTANEOUS at 17:48

## 2017-06-01 RX ADMIN — LEVOTHYROXINE SODIUM 75 MCG: 75 TABLET ORAL at 21:13

## 2017-06-01 RX ADMIN — WARFARIN SODIUM 4 MG: 1 TABLET ORAL at 17:47

## 2017-06-01 RX ADMIN — CARBIDOPA AND LEVODOPA 1 TABLET: 25; 100 TABLET ORAL at 17:48

## 2017-06-01 RX ADMIN — TRAMADOL HYDROCHLORIDE 50 MG: 50 TABLET, FILM COATED ORAL at 12:10

## 2017-06-01 RX ADMIN — METOPROLOL TARTRATE 25 MG: 25 TABLET ORAL at 21:13

## 2017-06-01 RX ADMIN — IPRATROPIUM BROMIDE AND ALBUTEROL SULFATE 3 ML: 2.5; .5 SOLUTION RESPIRATORY (INHALATION) at 13:48

## 2017-06-01 RX ADMIN — CALCIUM ACETATE 667 MG: 667 CAPSULE ORAL at 12:10

## 2017-06-01 RX ADMIN — HYDROXYCHLOROQUINE SULFATE 200 MG: 200 TABLET, FILM COATED ORAL at 21:14

## 2017-06-01 RX ADMIN — CARBIDOPA AND LEVODOPA 1 TABLET: 25; 100 TABLET ORAL at 08:25

## 2017-06-01 RX ADMIN — GABAPENTIN 200 MG: 100 CAPSULE ORAL at 21:13

## 2017-06-01 RX ADMIN — METOPROLOL TARTRATE 25 MG: 25 TABLET ORAL at 08:26

## 2017-06-01 RX ADMIN — IPRATROPIUM BROMIDE AND ALBUTEROL SULFATE 3 ML: 2.5; .5 SOLUTION RESPIRATORY (INHALATION) at 02:53

## 2017-06-01 RX ADMIN — IPRATROPIUM BROMIDE AND ALBUTEROL SULFATE 3 ML: 2.5; .5 SOLUTION RESPIRATORY (INHALATION) at 07:37

## 2017-06-01 RX ADMIN — HEPARIN SODIUM 5000 UNITS: 5000 INJECTION, SOLUTION INTRAVENOUS; SUBCUTANEOUS at 05:37

## 2017-06-01 RX ADMIN — PANTOPRAZOLE SODIUM 20 MG: 20 TABLET, DELAYED RELEASE ORAL at 05:37

## 2017-06-02 LAB
BASOPHILS # BLD AUTO: 0.05 THOUSANDS/ΜL (ref 0–0.1)
BASOPHILS NFR BLD AUTO: 0 % (ref 0–1)
EOSINOPHIL # BLD AUTO: 0.31 THOUSAND/ΜL (ref 0–0.61)
EOSINOPHIL NFR BLD AUTO: 2 % (ref 0–6)
ERYTHROCYTE [DISTWIDTH] IN BLOOD BY AUTOMATED COUNT: 17.9 % (ref 11.6–15.1)
GLUCOSE SERPL-MCNC: 116 MG/DL (ref 65–140)
GLUCOSE SERPL-MCNC: 142 MG/DL (ref 65–140)
GLUCOSE SERPL-MCNC: 169 MG/DL (ref 65–140)
GLUCOSE SERPL-MCNC: 83 MG/DL (ref 65–140)
HCT VFR BLD AUTO: 27.1 % (ref 34.8–46.1)
HGB BLD-MCNC: 8 G/DL (ref 11.5–15.4)
LYMPHOCYTES # BLD AUTO: 7.46 THOUSANDS/ΜL (ref 0.6–4.47)
LYMPHOCYTES NFR BLD AUTO: 52 % (ref 14–44)
MCH RBC QN AUTO: 28.4 PG (ref 26.8–34.3)
MCHC RBC AUTO-ENTMCNC: 29.5 G/DL (ref 31.4–37.4)
MCV RBC AUTO: 96 FL (ref 82–98)
MONOCYTES # BLD AUTO: 0.52 THOUSAND/ΜL (ref 0.17–1.22)
MONOCYTES NFR BLD AUTO: 4 % (ref 4–12)
NEUTROPHILS # BLD AUTO: 6.14 THOUSANDS/ΜL (ref 1.85–7.62)
NEUTS SEG NFR BLD AUTO: 42 % (ref 43–75)
NRBC BLD AUTO-RTO: 0 /100 WBCS
PLATELET # BLD AUTO: 209 THOUSANDS/UL (ref 149–390)
PMV BLD AUTO: 9.3 FL (ref 8.9–12.7)
RBC # BLD AUTO: 2.82 MILLION/UL (ref 3.81–5.12)
WBC # BLD AUTO: 14.78 THOUSAND/UL (ref 4.31–10.16)

## 2017-06-02 PROCEDURE — 82948 REAGENT STRIP/BLOOD GLUCOSE: CPT

## 2017-06-02 PROCEDURE — 85025 COMPLETE CBC W/AUTO DIFF WBC: CPT | Performed by: INTERNAL MEDICINE

## 2017-06-02 PROCEDURE — 94760 N-INVAS EAR/PLS OXIMETRY 1: CPT

## 2017-06-02 PROCEDURE — 92610 EVALUATE SWALLOWING FUNCTION: CPT

## 2017-06-02 RX ORDER — NYSTATIN 100000 U/G
CREAM TOPICAL 2 TIMES DAILY
Status: DISCONTINUED | OUTPATIENT
Start: 2017-06-02 | End: 2017-06-09 | Stop reason: HOSPADM

## 2017-06-02 RX ADMIN — NYSTATIN: 100000 CREAM TOPICAL at 17:25

## 2017-06-02 RX ADMIN — METOPROLOL TARTRATE 25 MG: 25 TABLET ORAL at 21:30

## 2017-06-02 RX ADMIN — CARBIDOPA AND LEVODOPA 1 TABLET: 25; 100 TABLET ORAL at 10:32

## 2017-06-02 RX ADMIN — ASPIRIN 325 MG: 325 TABLET ORAL at 10:32

## 2017-06-02 RX ADMIN — INSULIN LISPRO 3 UNITS: 100 INJECTION, SOLUTION INTRAVENOUS; SUBCUTANEOUS at 17:50

## 2017-06-02 RX ADMIN — GABAPENTIN 200 MG: 100 CAPSULE ORAL at 21:30

## 2017-06-02 RX ADMIN — CALCIUM ACETATE 667 MG: 667 CAPSULE ORAL at 17:24

## 2017-06-02 RX ADMIN — PANTOPRAZOLE SODIUM 20 MG: 20 TABLET, DELAYED RELEASE ORAL at 05:24

## 2017-06-02 RX ADMIN — WARFARIN SODIUM 4 MG: 1 TABLET ORAL at 17:24

## 2017-06-02 RX ADMIN — INSULIN LISPRO 3 UNITS: 100 INJECTION, SOLUTION INTRAVENOUS; SUBCUTANEOUS at 13:01

## 2017-06-02 RX ADMIN — METOPROLOL TARTRATE 25 MG: 25 TABLET ORAL at 10:32

## 2017-06-02 RX ADMIN — LEVOTHYROXINE SODIUM 75 MCG: 75 TABLET ORAL at 21:30

## 2017-06-02 RX ADMIN — CARBIDOPA AND LEVODOPA 1 TABLET: 25; 100 TABLET ORAL at 17:24

## 2017-06-02 RX ADMIN — HYDROXYCHLOROQUINE SULFATE 200 MG: 200 TABLET, FILM COATED ORAL at 21:32

## 2017-06-02 RX ADMIN — CALCIUM ACETATE 667 MG: 667 CAPSULE ORAL at 13:01

## 2017-06-02 RX ADMIN — CARBIDOPA AND LEVODOPA 1 TABLET: 25; 100 TABLET ORAL at 21:30

## 2017-06-02 RX ADMIN — INSULIN LISPRO 1 UNITS: 100 INJECTION, SOLUTION INTRAVENOUS; SUBCUTANEOUS at 17:50

## 2017-06-02 RX ADMIN — INSULIN DETEMIR 10 UNITS: 100 INJECTION, SOLUTION SUBCUTANEOUS at 17:25

## 2017-06-03 ENCOUNTER — APPOINTMENT (INPATIENT)
Dept: DIALYSIS | Facility: HOSPITAL | Age: 77
DRG: 314 | End: 2017-06-03
Attending: INTERNAL MEDICINE
Payer: MEDICARE

## 2017-06-03 LAB
ANION GAP SERPL CALCULATED.3IONS-SCNC: 7 MMOL/L (ref 4–13)
BUN SERPL-MCNC: 17 MG/DL (ref 5–25)
CALCIUM SERPL-MCNC: 9 MG/DL (ref 8.3–10.1)
CHLORIDE SERPL-SCNC: 98 MMOL/L (ref 100–108)
CO2 SERPL-SCNC: 31 MMOL/L (ref 21–32)
CREAT SERPL-MCNC: 3.32 MG/DL (ref 0.6–1.3)
ERYTHROCYTE [DISTWIDTH] IN BLOOD BY AUTOMATED COUNT: 17.5 % (ref 11.6–15.1)
GFR SERPL CREATININE-BSD FRML MDRD: 13.5 ML/MIN/1.73SQ M
GLUCOSE SERPL-MCNC: 138 MG/DL (ref 65–140)
GLUCOSE SERPL-MCNC: 157 MG/DL (ref 65–140)
GLUCOSE SERPL-MCNC: 77 MG/DL (ref 65–140)
GLUCOSE SERPL-MCNC: 84 MG/DL (ref 65–140)
GLUCOSE SERPL-MCNC: 85 MG/DL (ref 65–140)
GLUCOSE SERPL-MCNC: 89 MG/DL (ref 65–140)
HCT VFR BLD AUTO: 25.4 % (ref 34.8–46.1)
HGB BLD-MCNC: 8 G/DL (ref 11.5–15.4)
INR PPP: 2.21 (ref 0.86–1.16)
MCH RBC QN AUTO: 28.8 PG (ref 26.8–34.3)
MCHC RBC AUTO-ENTMCNC: 31.5 G/DL (ref 31.4–37.4)
MCV RBC AUTO: 91 FL (ref 82–98)
PLATELET # BLD AUTO: 230 THOUSANDS/UL (ref 149–390)
PMV BLD AUTO: 9.3 FL (ref 8.9–12.7)
POTASSIUM SERPL-SCNC: 3.9 MMOL/L (ref 3.5–5.3)
PROTHROMBIN TIME: 24.8 SECONDS (ref 12.1–14.4)
RBC # BLD AUTO: 2.78 MILLION/UL (ref 3.81–5.12)
SODIUM SERPL-SCNC: 136 MMOL/L (ref 136–145)
WBC # BLD AUTO: 16.24 THOUSAND/UL (ref 4.31–10.16)

## 2017-06-03 PROCEDURE — 85610 PROTHROMBIN TIME: CPT | Performed by: FAMILY MEDICINE

## 2017-06-03 PROCEDURE — 80048 BASIC METABOLIC PNL TOTAL CA: CPT | Performed by: FAMILY MEDICINE

## 2017-06-03 PROCEDURE — 5A1D60Z PERFORMANCE OF URINARY FILTRATION, MULTIPLE: ICD-10-PCS | Performed by: RADIOLOGY

## 2017-06-03 PROCEDURE — 82948 REAGENT STRIP/BLOOD GLUCOSE: CPT

## 2017-06-03 PROCEDURE — 85027 COMPLETE CBC AUTOMATED: CPT | Performed by: FAMILY MEDICINE

## 2017-06-03 RX ADMIN — NYSTATIN: 100000 CREAM TOPICAL at 17:17

## 2017-06-03 RX ADMIN — ACETAMINOPHEN 650 MG: 325 TABLET, FILM COATED ORAL at 22:42

## 2017-06-03 RX ADMIN — PANTOPRAZOLE SODIUM 20 MG: 20 TABLET, DELAYED RELEASE ORAL at 05:28

## 2017-06-03 RX ADMIN — INSULIN LISPRO 1 UNITS: 100 INJECTION, SOLUTION INTRAVENOUS; SUBCUTANEOUS at 21:19

## 2017-06-03 RX ADMIN — INSULIN LISPRO 3 UNITS: 100 INJECTION, SOLUTION INTRAVENOUS; SUBCUTANEOUS at 17:17

## 2017-06-03 RX ADMIN — CARBIDOPA AND LEVODOPA 1 TABLET: 25; 100 TABLET ORAL at 21:18

## 2017-06-03 RX ADMIN — EPOETIN ALFA 6000 UNITS: 3000 SOLUTION INTRAVENOUS; SUBCUTANEOUS at 13:43

## 2017-06-03 RX ADMIN — HYDROXYCHLOROQUINE SULFATE 200 MG: 200 TABLET, FILM COATED ORAL at 21:19

## 2017-06-03 RX ADMIN — CARBIDOPA AND LEVODOPA 1 TABLET: 25; 100 TABLET ORAL at 16:45

## 2017-06-03 RX ADMIN — DAPTOMYCIN 625 MG: 500 INJECTION, POWDER, LYOPHILIZED, FOR SOLUTION INTRAVENOUS at 11:42

## 2017-06-03 RX ADMIN — CALCIUM ACETATE 667 MG: 667 CAPSULE ORAL at 16:44

## 2017-06-03 RX ADMIN — GABAPENTIN 200 MG: 100 CAPSULE ORAL at 21:18

## 2017-06-03 RX ADMIN — METOPROLOL TARTRATE 25 MG: 25 TABLET ORAL at 13:37

## 2017-06-03 RX ADMIN — WARFARIN SODIUM 4 MG: 1 TABLET ORAL at 17:17

## 2017-06-03 RX ADMIN — TRAMADOL HYDROCHLORIDE 50 MG: 50 TABLET, COATED ORAL at 18:02

## 2017-06-03 RX ADMIN — ASPIRIN 325 MG: 325 TABLET ORAL at 13:37

## 2017-06-03 RX ADMIN — LEVOTHYROXINE SODIUM 75 MCG: 75 TABLET ORAL at 21:18

## 2017-06-03 RX ADMIN — METOPROLOL TARTRATE 25 MG: 25 TABLET ORAL at 21:18

## 2017-06-03 RX ADMIN — INSULIN DETEMIR 10 UNITS: 100 INJECTION, SOLUTION SUBCUTANEOUS at 17:17

## 2017-06-03 RX ADMIN — CALCIUM ACETATE 667 MG: 667 CAPSULE ORAL at 13:37

## 2017-06-03 RX ADMIN — NYSTATIN: 100000 CREAM TOPICAL at 13:38

## 2017-06-04 LAB
ERYTHROCYTE [DISTWIDTH] IN BLOOD BY AUTOMATED COUNT: 17.8 % (ref 11.6–15.1)
GLUCOSE SERPL-MCNC: 114 MG/DL (ref 65–140)
GLUCOSE SERPL-MCNC: 126 MG/DL (ref 65–140)
GLUCOSE SERPL-MCNC: 197 MG/DL (ref 65–140)
GLUCOSE SERPL-MCNC: 67 MG/DL (ref 65–140)
GLUCOSE SERPL-MCNC: 87 MG/DL (ref 65–140)
HCT VFR BLD AUTO: 23.8 % (ref 34.8–46.1)
HGB BLD-MCNC: 7.3 G/DL (ref 11.5–15.4)
INR PPP: 2.33 (ref 0.86–1.16)
MCH RBC QN AUTO: 28.1 PG (ref 26.8–34.3)
MCHC RBC AUTO-ENTMCNC: 30.7 G/DL (ref 31.4–37.4)
MCV RBC AUTO: 92 FL (ref 82–98)
PLATELET # BLD AUTO: 225 THOUSANDS/UL (ref 149–390)
PMV BLD AUTO: 9.1 FL (ref 8.9–12.7)
PROTHROMBIN TIME: 25.8 SECONDS (ref 12.1–14.4)
RBC # BLD AUTO: 2.6 MILLION/UL (ref 3.81–5.12)
WBC # BLD AUTO: 15.22 THOUSAND/UL (ref 4.31–10.16)

## 2017-06-04 PROCEDURE — 85027 COMPLETE CBC AUTOMATED: CPT | Performed by: FAMILY MEDICINE

## 2017-06-04 PROCEDURE — 82948 REAGENT STRIP/BLOOD GLUCOSE: CPT

## 2017-06-04 PROCEDURE — 85610 PROTHROMBIN TIME: CPT | Performed by: INTERNAL MEDICINE

## 2017-06-04 RX ADMIN — NYSTATIN: 100000 CREAM TOPICAL at 08:31

## 2017-06-04 RX ADMIN — METOPROLOL TARTRATE 25 MG: 25 TABLET ORAL at 22:27

## 2017-06-04 RX ADMIN — PANTOPRAZOLE SODIUM 20 MG: 20 TABLET, DELAYED RELEASE ORAL at 05:28

## 2017-06-04 RX ADMIN — METOPROLOL TARTRATE 25 MG: 25 TABLET ORAL at 08:30

## 2017-06-04 RX ADMIN — CALCIUM ACETATE 667 MG: 667 CAPSULE ORAL at 16:37

## 2017-06-04 RX ADMIN — CALCIUM ACETATE 667 MG: 667 CAPSULE ORAL at 12:11

## 2017-06-04 RX ADMIN — NYSTATIN 1 APPLICATION: 100000 CREAM TOPICAL at 17:48

## 2017-06-04 RX ADMIN — GABAPENTIN 200 MG: 100 CAPSULE ORAL at 22:28

## 2017-06-04 RX ADMIN — INSULIN LISPRO 3 UNITS: 100 INJECTION, SOLUTION INTRAVENOUS; SUBCUTANEOUS at 12:09

## 2017-06-04 RX ADMIN — INSULIN LISPRO 3 UNITS: 100 INJECTION, SOLUTION INTRAVENOUS; SUBCUTANEOUS at 08:30

## 2017-06-04 RX ADMIN — TRAMADOL HYDROCHLORIDE 50 MG: 50 TABLET, COATED ORAL at 09:37

## 2017-06-04 RX ADMIN — CARBIDOPA AND LEVODOPA 1 TABLET: 25; 100 TABLET ORAL at 22:28

## 2017-06-04 RX ADMIN — INSULIN LISPRO 3 UNITS: 100 INJECTION, SOLUTION INTRAVENOUS; SUBCUTANEOUS at 16:38

## 2017-06-04 RX ADMIN — WARFARIN SODIUM 4 MG: 1 TABLET ORAL at 17:47

## 2017-06-04 RX ADMIN — ASPIRIN 325 MG: 325 TABLET ORAL at 08:30

## 2017-06-04 RX ADMIN — CARBIDOPA AND LEVODOPA 1 TABLET: 25; 100 TABLET ORAL at 16:39

## 2017-06-04 RX ADMIN — LEVOTHYROXINE SODIUM 75 MCG: 75 TABLET ORAL at 22:26

## 2017-06-04 RX ADMIN — INSULIN LISPRO 2 UNITS: 100 INJECTION, SOLUTION INTRAVENOUS; SUBCUTANEOUS at 16:37

## 2017-06-04 RX ADMIN — CARBIDOPA AND LEVODOPA 1 TABLET: 25; 100 TABLET ORAL at 08:30

## 2017-06-04 RX ADMIN — TRAMADOL HYDROCHLORIDE 50 MG: 50 TABLET, COATED ORAL at 23:18

## 2017-06-04 RX ADMIN — INSULIN DETEMIR 10 UNITS: 100 INJECTION, SOLUTION SUBCUTANEOUS at 17:48

## 2017-06-04 RX ADMIN — CALCIUM ACETATE 667 MG: 667 CAPSULE ORAL at 08:29

## 2017-06-04 RX ADMIN — HYDROXYCHLOROQUINE SULFATE 200 MG: 200 TABLET, FILM COATED ORAL at 22:26

## 2017-06-05 ENCOUNTER — GENERIC CONVERSION - ENCOUNTER (OUTPATIENT)
Dept: OTHER | Facility: OTHER | Age: 77
End: 2017-06-05

## 2017-06-05 ENCOUNTER — APPOINTMENT (INPATIENT)
Dept: RADIOLOGY | Facility: HOSPITAL | Age: 77
DRG: 314 | End: 2017-06-05
Attending: FAMILY MEDICINE
Payer: MEDICARE

## 2017-06-05 LAB
ABO GROUP BLD: NORMAL
ANION GAP SERPL CALCULATED.3IONS-SCNC: 8 MMOL/L (ref 4–13)
BLD GP AB SCN SERPL QL: POSITIVE
BLOOD GROUP ANTIBODIES SERPL: NORMAL
BUN SERPL-MCNC: 14 MG/DL (ref 5–25)
CALCIUM SERPL-MCNC: 8.6 MG/DL (ref 8.3–10.1)
CHLORIDE SERPL-SCNC: 95 MMOL/L (ref 100–108)
CO2 SERPL-SCNC: 27 MMOL/L (ref 21–32)
CREAT SERPL-MCNC: 3.4 MG/DL (ref 0.6–1.3)
ERYTHROCYTE [DISTWIDTH] IN BLOOD BY AUTOMATED COUNT: 17.7 % (ref 11.6–15.1)
GFR SERPL CREATININE-BSD FRML MDRD: 13.1 ML/MIN/1.73SQ M
GLUCOSE SERPL-MCNC: 106 MG/DL (ref 65–140)
GLUCOSE SERPL-MCNC: 108 MG/DL (ref 65–140)
GLUCOSE SERPL-MCNC: 115 MG/DL (ref 65–140)
GLUCOSE SERPL-MCNC: 211 MG/DL (ref 65–140)
GLUCOSE SERPL-MCNC: 91 MG/DL (ref 65–140)
HCT VFR BLD AUTO: 25.1 % (ref 34.8–46.1)
HGB BLD-MCNC: 7.7 G/DL (ref 11.5–15.4)
KELL GROUP AG RBC: NEGATIVE
MCH RBC QN AUTO: 28 PG (ref 26.8–34.3)
MCHC RBC AUTO-ENTMCNC: 30.7 G/DL (ref 31.4–37.4)
MCV RBC AUTO: 91 FL (ref 82–98)
PLATELET # BLD AUTO: 239 THOUSANDS/UL (ref 149–390)
PMV BLD AUTO: 9.8 FL (ref 8.9–12.7)
POTASSIUM SERPL-SCNC: 4.4 MMOL/L (ref 3.5–5.3)
RBC # BLD AUTO: 2.75 MILLION/UL (ref 3.81–5.12)
RH BLD: NEGATIVE
SODIUM SERPL-SCNC: 130 MMOL/L (ref 136–145)
SPECIMEN EXPIRATION DATE: NORMAL
WBC # BLD AUTO: 14.46 THOUSAND/UL (ref 4.31–10.16)

## 2017-06-05 PROCEDURE — 86905 BLOOD TYPING RBC ANTIGENS: CPT

## 2017-06-05 PROCEDURE — 76937 US GUIDE VASCULAR ACCESS: CPT

## 2017-06-05 PROCEDURE — 85027 COMPLETE CBC AUTOMATED: CPT | Performed by: FAMILY MEDICINE

## 2017-06-05 PROCEDURE — 05H433Z INSERTION OF INFUSION DEVICE INTO LEFT INNOMINATE VEIN, PERCUTANEOUS APPROACH: ICD-10-PCS | Performed by: INTERNAL MEDICINE

## 2017-06-05 PROCEDURE — 86901 BLOOD TYPING SEROLOGIC RH(D): CPT | Performed by: FAMILY MEDICINE

## 2017-06-05 PROCEDURE — 82948 REAGENT STRIP/BLOOD GLUCOSE: CPT

## 2017-06-05 PROCEDURE — 80048 BASIC METABOLIC PNL TOTAL CA: CPT | Performed by: FAMILY MEDICINE

## 2017-06-05 PROCEDURE — 86870 RBC ANTIBODY IDENTIFICATION: CPT | Performed by: FAMILY MEDICINE

## 2017-06-05 PROCEDURE — 86900 BLOOD TYPING SEROLOGIC ABO: CPT | Performed by: FAMILY MEDICINE

## 2017-06-05 PROCEDURE — C1751 CATH, INF, PER/CENT/MIDLINE: HCPCS

## 2017-06-05 PROCEDURE — 36569 INSJ PICC 5 YR+ W/O IMAGING: CPT

## 2017-06-05 PROCEDURE — 77001 FLUOROGUIDE FOR VEIN DEVICE: CPT

## 2017-06-05 PROCEDURE — 86850 RBC ANTIBODY SCREEN: CPT | Performed by: FAMILY MEDICINE

## 2017-06-05 RX ORDER — TRAMADOL HYDROCHLORIDE 50 MG/1
50 TABLET ORAL EVERY 6 HOURS PRN
Status: DISCONTINUED | OUTPATIENT
Start: 2017-06-05 | End: 2017-06-09 | Stop reason: HOSPADM

## 2017-06-05 RX ADMIN — LEVOTHYROXINE SODIUM 75 MCG: 75 TABLET ORAL at 23:00

## 2017-06-05 RX ADMIN — CALCIUM ACETATE 667 MG: 667 CAPSULE ORAL at 18:24

## 2017-06-05 RX ADMIN — CARBIDOPA AND LEVODOPA 1 TABLET: 25; 100 TABLET ORAL at 20:20

## 2017-06-05 RX ADMIN — TRAMADOL HYDROCHLORIDE 50 MG: 50 TABLET, COATED ORAL at 05:18

## 2017-06-05 RX ADMIN — METOPROLOL TARTRATE 25 MG: 25 TABLET ORAL at 20:20

## 2017-06-05 RX ADMIN — GABAPENTIN 200 MG: 100 CAPSULE ORAL at 23:00

## 2017-06-05 RX ADMIN — DAPTOMYCIN 625 MG: 500 INJECTION, POWDER, LYOPHILIZED, FOR SOLUTION INTRAVENOUS at 09:54

## 2017-06-05 RX ADMIN — NYSTATIN: 100000 CREAM TOPICAL at 18:26

## 2017-06-05 RX ADMIN — PANTOPRAZOLE SODIUM 20 MG: 20 TABLET, DELAYED RELEASE ORAL at 05:17

## 2017-06-05 RX ADMIN — CARBIDOPA AND LEVODOPA 1 TABLET: 25; 100 TABLET ORAL at 09:54

## 2017-06-05 RX ADMIN — WARFARIN SODIUM 4 MG: 1 TABLET ORAL at 18:24

## 2017-06-05 RX ADMIN — INSULIN LISPRO 2 UNITS: 100 INJECTION, SOLUTION INTRAVENOUS; SUBCUTANEOUS at 23:00

## 2017-06-05 RX ADMIN — CARBIDOPA AND LEVODOPA 1 TABLET: 25; 100 TABLET ORAL at 18:24

## 2017-06-05 RX ADMIN — INSULIN DETEMIR 10 UNITS: 100 INJECTION, SOLUTION SUBCUTANEOUS at 18:24

## 2017-06-05 RX ADMIN — NYSTATIN: 100000 CREAM TOPICAL at 09:55

## 2017-06-05 RX ADMIN — ASPIRIN 325 MG: 325 TABLET ORAL at 09:54

## 2017-06-05 RX ADMIN — HYDROXYCHLOROQUINE SULFATE 200 MG: 200 TABLET, FILM COATED ORAL at 23:00

## 2017-06-05 RX ADMIN — TRAMADOL HYDROCHLORIDE 50 MG: 50 TABLET, COATED ORAL at 23:20

## 2017-06-05 RX ADMIN — METOPROLOL TARTRATE 25 MG: 25 TABLET ORAL at 09:54

## 2017-06-06 ENCOUNTER — APPOINTMENT (INPATIENT)
Dept: DIALYSIS | Facility: HOSPITAL | Age: 77
DRG: 314 | End: 2017-06-06
Attending: INTERNAL MEDICINE
Payer: MEDICARE

## 2017-06-06 LAB
ANION GAP SERPL CALCULATED.3IONS-SCNC: 9 MMOL/L (ref 4–13)
BUN SERPL-MCNC: 21 MG/DL (ref 5–25)
CALCIUM SERPL-MCNC: 8.7 MG/DL (ref 8.3–10.1)
CHLORIDE SERPL-SCNC: 92 MMOL/L (ref 100–108)
CK SERPL-CCNC: 27 U/L (ref 26–192)
CO2 SERPL-SCNC: 30 MMOL/L (ref 21–32)
CREAT SERPL-MCNC: 4.14 MG/DL (ref 0.6–1.3)
ERYTHROCYTE [DISTWIDTH] IN BLOOD BY AUTOMATED COUNT: 17.8 % (ref 11.6–15.1)
GFR SERPL CREATININE-BSD FRML MDRD: 10.5 ML/MIN/1.73SQ M
GLUCOSE SERPL-MCNC: 119 MG/DL (ref 65–140)
GLUCOSE SERPL-MCNC: 220 MG/DL (ref 65–140)
GLUCOSE SERPL-MCNC: 225 MG/DL (ref 65–140)
GLUCOSE SERPL-MCNC: 68 MG/DL (ref 65–140)
GLUCOSE SERPL-MCNC: 82 MG/DL (ref 65–140)
HCT VFR BLD AUTO: 22.8 % (ref 34.8–46.1)
HGB BLD-MCNC: 7.2 G/DL (ref 11.5–15.4)
INR PPP: 2.6 (ref 0.86–1.16)
MCH RBC QN AUTO: 28.7 PG (ref 26.8–34.3)
MCHC RBC AUTO-ENTMCNC: 31.6 G/DL (ref 31.4–37.4)
MCV RBC AUTO: 91 FL (ref 82–98)
PLATELET # BLD AUTO: 266 THOUSANDS/UL (ref 149–390)
PMV BLD AUTO: 9.3 FL (ref 8.9–12.7)
POTASSIUM SERPL-SCNC: 4.4 MMOL/L (ref 3.5–5.3)
PROTHROMBIN TIME: 28.2 SECONDS (ref 12.1–14.4)
RBC # BLD AUTO: 2.51 MILLION/UL (ref 3.81–5.12)
SODIUM SERPL-SCNC: 131 MMOL/L (ref 136–145)
WBC # BLD AUTO: 14.11 THOUSAND/UL (ref 4.31–10.16)

## 2017-06-06 PROCEDURE — 85027 COMPLETE CBC AUTOMATED: CPT | Performed by: FAMILY MEDICINE

## 2017-06-06 PROCEDURE — 82948 REAGENT STRIP/BLOOD GLUCOSE: CPT

## 2017-06-06 PROCEDURE — 82550 ASSAY OF CK (CPK): CPT | Performed by: INTERNAL MEDICINE

## 2017-06-06 PROCEDURE — 85610 PROTHROMBIN TIME: CPT | Performed by: FAMILY MEDICINE

## 2017-06-06 PROCEDURE — 80048 BASIC METABOLIC PNL TOTAL CA: CPT | Performed by: FAMILY MEDICINE

## 2017-06-06 RX ADMIN — INSULIN LISPRO 2 UNITS: 100 INJECTION, SOLUTION INTRAVENOUS; SUBCUTANEOUS at 22:29

## 2017-06-06 RX ADMIN — NYSTATIN: 100000 CREAM TOPICAL at 17:23

## 2017-06-06 RX ADMIN — CALCIUM ACETATE 667 MG: 667 CAPSULE ORAL at 07:51

## 2017-06-06 RX ADMIN — CARBIDOPA AND LEVODOPA 1 TABLET: 25; 100 TABLET ORAL at 17:23

## 2017-06-06 RX ADMIN — WARFARIN SODIUM 4 MG: 1 TABLET ORAL at 17:23

## 2017-06-06 RX ADMIN — GABAPENTIN 200 MG: 100 CAPSULE ORAL at 22:27

## 2017-06-06 RX ADMIN — ASPIRIN 325 MG: 325 TABLET ORAL at 08:00

## 2017-06-06 RX ADMIN — LEVOTHYROXINE SODIUM 75 MCG: 75 TABLET ORAL at 22:28

## 2017-06-06 RX ADMIN — TRAMADOL HYDROCHLORIDE 50 MG: 50 TABLET, COATED ORAL at 13:54

## 2017-06-06 RX ADMIN — INSULIN LISPRO 3 UNITS: 100 INJECTION, SOLUTION INTRAVENOUS; SUBCUTANEOUS at 07:52

## 2017-06-06 RX ADMIN — CALCIUM ACETATE 667 MG: 667 CAPSULE ORAL at 17:23

## 2017-06-06 RX ADMIN — EPOETIN ALFA 6000 UNITS: 3000 SOLUTION INTRAVENOUS; SUBCUTANEOUS at 11:05

## 2017-06-06 RX ADMIN — NYSTATIN: 100000 CREAM TOPICAL at 08:00

## 2017-06-06 RX ADMIN — PANTOPRAZOLE SODIUM 20 MG: 20 TABLET, DELAYED RELEASE ORAL at 05:52

## 2017-06-06 RX ADMIN — HYDROXYCHLOROQUINE SULFATE 200 MG: 200 TABLET, FILM COATED ORAL at 22:28

## 2017-06-06 RX ADMIN — INSULIN LISPRO 3 UNITS: 100 INJECTION, SOLUTION INTRAVENOUS; SUBCUTANEOUS at 17:19

## 2017-06-06 RX ADMIN — INSULIN DETEMIR 10 UNITS: 100 INJECTION, SOLUTION SUBCUTANEOUS at 17:23

## 2017-06-06 RX ADMIN — CARBIDOPA AND LEVODOPA 1 TABLET: 25; 100 TABLET ORAL at 08:00

## 2017-06-06 RX ADMIN — METOPROLOL TARTRATE 25 MG: 25 TABLET ORAL at 22:27

## 2017-06-06 RX ADMIN — CARBIDOPA AND LEVODOPA 1 TABLET: 25; 100 TABLET ORAL at 22:27

## 2017-06-06 RX ADMIN — INSULIN LISPRO 2 UNITS: 100 INJECTION, SOLUTION INTRAVENOUS; SUBCUTANEOUS at 17:19

## 2017-06-07 LAB
BASOPHILS # BLD AUTO: 0.02 THOUSANDS/ΜL (ref 0–0.1)
BASOPHILS NFR BLD AUTO: 0 % (ref 0–1)
EOSINOPHIL # BLD AUTO: 0.28 THOUSAND/ΜL (ref 0–0.61)
EOSINOPHIL NFR BLD AUTO: 2 % (ref 0–6)
ERYTHROCYTE [DISTWIDTH] IN BLOOD BY AUTOMATED COUNT: 18.3 % (ref 11.6–15.1)
GLUCOSE SERPL-MCNC: 122 MG/DL (ref 65–140)
GLUCOSE SERPL-MCNC: 180 MG/DL (ref 65–140)
GLUCOSE SERPL-MCNC: 191 MG/DL (ref 65–140)
GLUCOSE SERPL-MCNC: 85 MG/DL (ref 65–140)
HCT VFR BLD AUTO: 22.8 % (ref 34.8–46.1)
HGB BLD-MCNC: 7.1 G/DL (ref 11.5–15.4)
LYMPHOCYTES # BLD AUTO: 6.84 THOUSANDS/ΜL (ref 0.6–4.47)
LYMPHOCYTES NFR BLD AUTO: 54 % (ref 14–44)
MCH RBC QN AUTO: 28.5 PG (ref 26.8–34.3)
MCHC RBC AUTO-ENTMCNC: 31.1 G/DL (ref 31.4–37.4)
MCV RBC AUTO: 92 FL (ref 82–98)
MONOCYTES # BLD AUTO: 0.6 THOUSAND/ΜL (ref 0.17–1.22)
MONOCYTES NFR BLD AUTO: 5 % (ref 4–12)
NEUTROPHILS # BLD AUTO: 5.07 THOUSANDS/ΜL (ref 1.85–7.62)
NEUTS SEG NFR BLD AUTO: 39 % (ref 43–75)
NRBC BLD AUTO-RTO: 0 /100 WBCS
PLATELET # BLD AUTO: 249 THOUSANDS/UL (ref 149–390)
PMV BLD AUTO: 9.3 FL (ref 8.9–12.7)
RBC # BLD AUTO: 2.49 MILLION/UL (ref 3.81–5.12)
WBC # BLD AUTO: 12.91 THOUSAND/UL (ref 4.31–10.16)

## 2017-06-07 PROCEDURE — 92526 ORAL FUNCTION THERAPY: CPT

## 2017-06-07 PROCEDURE — 82948 REAGENT STRIP/BLOOD GLUCOSE: CPT

## 2017-06-07 PROCEDURE — 85025 COMPLETE CBC W/AUTO DIFF WBC: CPT | Performed by: PHYSICIAN ASSISTANT

## 2017-06-07 RX ORDER — OXYCODONE HYDROCHLORIDE 5 MG/1
5 TABLET ORAL ONCE
Status: COMPLETED | OUTPATIENT
Start: 2017-06-07 | End: 2017-06-07

## 2017-06-07 RX ADMIN — INSULIN LISPRO 2 UNITS: 100 INJECTION, SOLUTION INTRAVENOUS; SUBCUTANEOUS at 21:48

## 2017-06-07 RX ADMIN — TRAMADOL HYDROCHLORIDE 50 MG: 50 TABLET, COATED ORAL at 16:57

## 2017-06-07 RX ADMIN — METOPROLOL TARTRATE 25 MG: 25 TABLET ORAL at 21:39

## 2017-06-07 RX ADMIN — CARBIDOPA AND LEVODOPA 1 TABLET: 25; 100 TABLET ORAL at 16:57

## 2017-06-07 RX ADMIN — ASPIRIN 325 MG: 325 TABLET ORAL at 08:19

## 2017-06-07 RX ADMIN — LEVOTHYROXINE SODIUM 75 MCG: 75 TABLET ORAL at 21:39

## 2017-06-07 RX ADMIN — INSULIN DETEMIR 10 UNITS: 100 INJECTION, SOLUTION SUBCUTANEOUS at 17:26

## 2017-06-07 RX ADMIN — METOPROLOL TARTRATE 25 MG: 25 TABLET ORAL at 08:19

## 2017-06-07 RX ADMIN — INSULIN LISPRO 3 UNITS: 100 INJECTION, SOLUTION INTRAVENOUS; SUBCUTANEOUS at 11:54

## 2017-06-07 RX ADMIN — CALCIUM ACETATE 667 MG: 667 CAPSULE ORAL at 11:54

## 2017-06-07 RX ADMIN — DAPTOMYCIN 625 MG: 500 INJECTION, POWDER, LYOPHILIZED, FOR SOLUTION INTRAVENOUS at 08:19

## 2017-06-07 RX ADMIN — INSULIN LISPRO 3 UNITS: 100 INJECTION, SOLUTION INTRAVENOUS; SUBCUTANEOUS at 17:27

## 2017-06-07 RX ADMIN — NYSTATIN: 100000 CREAM TOPICAL at 17:27

## 2017-06-07 RX ADMIN — PANTOPRAZOLE SODIUM 20 MG: 20 TABLET, DELAYED RELEASE ORAL at 05:58

## 2017-06-07 RX ADMIN — INSULIN LISPRO 1 UNITS: 100 INJECTION, SOLUTION INTRAVENOUS; SUBCUTANEOUS at 17:27

## 2017-06-07 RX ADMIN — CARBIDOPA AND LEVODOPA 1 TABLET: 25; 100 TABLET ORAL at 08:19

## 2017-06-07 RX ADMIN — CALCIUM ACETATE 667 MG: 667 CAPSULE ORAL at 16:57

## 2017-06-07 RX ADMIN — GABAPENTIN 200 MG: 100 CAPSULE ORAL at 21:39

## 2017-06-07 RX ADMIN — NYSTATIN 1 APPLICATION: 100000 CREAM TOPICAL at 08:20

## 2017-06-07 RX ADMIN — INSULIN LISPRO 3 UNITS: 100 INJECTION, SOLUTION INTRAVENOUS; SUBCUTANEOUS at 08:20

## 2017-06-07 RX ADMIN — CARBIDOPA AND LEVODOPA 1 TABLET: 25; 100 TABLET ORAL at 21:39

## 2017-06-07 RX ADMIN — WARFARIN SODIUM 4 MG: 1 TABLET ORAL at 17:26

## 2017-06-07 RX ADMIN — CALCIUM ACETATE 667 MG: 667 CAPSULE ORAL at 08:19

## 2017-06-07 RX ADMIN — HYDROXYCHLOROQUINE SULFATE 200 MG: 200 TABLET, FILM COATED ORAL at 21:40

## 2017-06-07 RX ADMIN — OXYCODONE HYDROCHLORIDE 5 MG: 5 TABLET ORAL at 18:38

## 2017-06-08 ENCOUNTER — APPOINTMENT (INPATIENT)
Dept: DIALYSIS | Facility: HOSPITAL | Age: 77
DRG: 314 | End: 2017-06-08
Attending: INTERNAL MEDICINE
Payer: MEDICARE

## 2017-06-08 LAB
ANION GAP SERPL CALCULATED.3IONS-SCNC: 8 MMOL/L (ref 4–13)
BUN SERPL-MCNC: 12 MG/DL (ref 5–25)
CALCIUM SERPL-MCNC: 8.7 MG/DL (ref 8.3–10.1)
CHLORIDE SERPL-SCNC: 97 MMOL/L (ref 100–108)
CO2 SERPL-SCNC: 29 MMOL/L (ref 21–32)
CREAT SERPL-MCNC: 3.31 MG/DL (ref 0.6–1.3)
GFR SERPL CREATININE-BSD FRML MDRD: 13.5 ML/MIN/1.73SQ M
GLUCOSE SERPL-MCNC: 112 MG/DL (ref 65–140)
GLUCOSE SERPL-MCNC: 122 MG/DL (ref 65–140)
GLUCOSE SERPL-MCNC: 163 MG/DL (ref 65–140)
GLUCOSE SERPL-MCNC: 220 MG/DL (ref 65–140)
GLUCOSE SERPL-MCNC: 91 MG/DL (ref 65–140)
INR PPP: 2.63 (ref 0.86–1.16)
POTASSIUM SERPL-SCNC: 4 MMOL/L (ref 3.5–5.3)
PROTHROMBIN TIME: 28.4 SECONDS (ref 12.1–14.4)
SODIUM SERPL-SCNC: 134 MMOL/L (ref 136–145)

## 2017-06-08 PROCEDURE — 82948 REAGENT STRIP/BLOOD GLUCOSE: CPT

## 2017-06-08 PROCEDURE — 80048 BASIC METABOLIC PNL TOTAL CA: CPT | Performed by: HOSPITALIST

## 2017-06-08 PROCEDURE — 85610 PROTHROMBIN TIME: CPT | Performed by: HOSPITALIST

## 2017-06-08 RX ADMIN — METOPROLOL TARTRATE 25 MG: 25 TABLET ORAL at 12:59

## 2017-06-08 RX ADMIN — CARBIDOPA AND LEVODOPA 1 TABLET: 25; 100 TABLET ORAL at 16:00

## 2017-06-08 RX ADMIN — HYDROXYCHLOROQUINE SULFATE 200 MG: 200 TABLET, FILM COATED ORAL at 23:40

## 2017-06-08 RX ADMIN — METOPROLOL TARTRATE 25 MG: 25 TABLET ORAL at 21:44

## 2017-06-08 RX ADMIN — CALCIUM ACETATE 667 MG: 667 CAPSULE ORAL at 12:59

## 2017-06-08 RX ADMIN — INSULIN LISPRO 3 UNITS: 100 INJECTION, SOLUTION INTRAVENOUS; SUBCUTANEOUS at 18:01

## 2017-06-08 RX ADMIN — WARFARIN SODIUM 4 MG: 1 TABLET ORAL at 18:01

## 2017-06-08 RX ADMIN — LEVOTHYROXINE SODIUM 75 MCG: 75 TABLET ORAL at 21:29

## 2017-06-08 RX ADMIN — TRAMADOL HYDROCHLORIDE 50 MG: 50 TABLET, COATED ORAL at 19:10

## 2017-06-08 RX ADMIN — CARBIDOPA AND LEVODOPA 1 TABLET: 25; 100 TABLET ORAL at 21:29

## 2017-06-08 RX ADMIN — INSULIN LISPRO 1 UNITS: 100 INJECTION, SOLUTION INTRAVENOUS; SUBCUTANEOUS at 18:00

## 2017-06-08 RX ADMIN — CALCIUM ACETATE 667 MG: 667 CAPSULE ORAL at 07:17

## 2017-06-08 RX ADMIN — INSULIN LISPRO 2 UNITS: 100 INJECTION, SOLUTION INTRAVENOUS; SUBCUTANEOUS at 21:30

## 2017-06-08 RX ADMIN — CALCIUM ACETATE 667 MG: 667 CAPSULE ORAL at 16:00

## 2017-06-08 RX ADMIN — GABAPENTIN 200 MG: 100 CAPSULE ORAL at 21:29

## 2017-06-08 RX ADMIN — NYSTATIN: 100000 CREAM TOPICAL at 18:01

## 2017-06-08 RX ADMIN — ASPIRIN 325 MG: 325 TABLET ORAL at 12:58

## 2017-06-08 RX ADMIN — INSULIN DETEMIR 10 UNITS: 100 INJECTION, SOLUTION SUBCUTANEOUS at 18:05

## 2017-06-08 RX ADMIN — EPOETIN ALFA 6000 UNITS: 3000 SOLUTION INTRAVENOUS; SUBCUTANEOUS at 08:36

## 2017-06-08 RX ADMIN — PANTOPRAZOLE SODIUM 20 MG: 20 TABLET, DELAYED RELEASE ORAL at 05:48

## 2017-06-08 RX ADMIN — TRAMADOL HYDROCHLORIDE 50 MG: 50 TABLET, COATED ORAL at 08:05

## 2017-06-09 VITALS
BODY MASS INDEX: 25.91 KG/M2 | RESPIRATION RATE: 18 BRPM | WEIGHT: 155.5 LBS | TEMPERATURE: 98.7 F | OXYGEN SATURATION: 97 % | SYSTOLIC BLOOD PRESSURE: 137 MMHG | HEART RATE: 67 BPM | HEIGHT: 65 IN | DIASTOLIC BLOOD PRESSURE: 64 MMHG

## 2017-06-09 LAB
GLUCOSE SERPL-MCNC: 71 MG/DL (ref 65–140)
GLUCOSE SERPL-MCNC: 81 MG/DL (ref 65–140)

## 2017-06-09 PROCEDURE — 05PYX3Z REMOVAL OF INFUSION DEVICE FROM UPPER VEIN, EXTERNAL APPROACH: ICD-10-PCS | Performed by: HOSPITALIST

## 2017-06-09 PROCEDURE — C9113 INJ PANTOPRAZOLE SODIUM, VIA: HCPCS | Performed by: HOSPITALIST

## 2017-06-09 PROCEDURE — 82948 REAGENT STRIP/BLOOD GLUCOSE: CPT

## 2017-06-09 RX ORDER — PANTOPRAZOLE SODIUM 40 MG/1
40 INJECTION, POWDER, FOR SOLUTION INTRAVENOUS
Status: DISCONTINUED | OUTPATIENT
Start: 2017-06-09 | End: 2017-06-09 | Stop reason: HOSPADM

## 2017-06-09 RX ORDER — NYSTATIN 100000 U/G
1 CREAM TOPICAL 2 TIMES DAILY
Qty: 30 G | Refills: 0 | Status: SHIPPED | OUTPATIENT
Start: 2017-06-09

## 2017-06-09 RX ADMIN — PANTOPRAZOLE SODIUM 40 MG: 40 INJECTION, POWDER, FOR SOLUTION INTRAVENOUS at 08:56

## 2017-06-09 RX ADMIN — NYSTATIN: 100000 CREAM TOPICAL at 08:52

## 2017-08-09 ENCOUNTER — HOSPITAL ENCOUNTER (OUTPATIENT)
Dept: RADIOLOGY | Facility: HOSPITAL | Age: 77
Discharge: HOME | End: 2017-08-09
Payer: MEDICARE

## 2017-08-28 ENCOUNTER — GENERIC CONVERSION - ENCOUNTER (OUTPATIENT)
Dept: OTHER | Facility: OTHER | Age: 77
End: 2017-08-28

## 2017-11-14 ENCOUNTER — APPOINTMENT (EMERGENCY)
Dept: RADIOLOGY | Facility: HOSPITAL | Age: 77
End: 2017-11-14
Payer: MEDICARE

## 2017-11-14 ENCOUNTER — HOSPITAL ENCOUNTER (EMERGENCY)
Facility: HOSPITAL | Age: 77
End: 2017-11-15
Attending: EMERGENCY MEDICINE
Payer: MEDICARE

## 2017-11-14 VITALS
OXYGEN SATURATION: 98 % | HEART RATE: 62 BPM | RESPIRATION RATE: 14 BRPM | DIASTOLIC BLOOD PRESSURE: 62 MMHG | SYSTOLIC BLOOD PRESSURE: 134 MMHG | TEMPERATURE: 99 F

## 2017-11-14 DIAGNOSIS — A41.9 SEPSIS (HCC): ICD-10-CM

## 2017-11-14 DIAGNOSIS — R19.7 DIARRHEA: ICD-10-CM

## 2017-11-14 DIAGNOSIS — L89.159 SACRAL DECUBITUS ULCER: ICD-10-CM

## 2017-11-14 DIAGNOSIS — R58 HEMORRHAGE: Primary | ICD-10-CM

## 2017-11-14 DIAGNOSIS — D68.9 COAGULOPATHY (HCC): ICD-10-CM

## 2017-11-14 LAB
ABO GROUP BLD: NORMAL
ALBUMIN SERPL BCP-MCNC: 2.8 G/DL (ref 3.5–5)
ALP SERPL-CCNC: 147 U/L (ref 46–116)
ALT SERPL W P-5'-P-CCNC: 9 U/L (ref 12–78)
ANION GAP SERPL CALCULATED.3IONS-SCNC: 10 MMOL/L (ref 4–13)
ANISOCYTOSIS BLD QL SMEAR: PRESENT
APTT PPP: 50 SECONDS (ref 23–35)
AST SERPL W P-5'-P-CCNC: 19 U/L (ref 5–45)
BACTERIA UR QL AUTO: ABNORMAL /HPF
BASOPHILS # BLD MANUAL: 0 THOUSAND/UL (ref 0–0.1)
BASOPHILS NFR MAR MANUAL: 0 % (ref 0–1)
BILIRUB SERPL-MCNC: 0.5 MG/DL (ref 0.2–1)
BILIRUB UR QL STRIP: ABNORMAL
BLD GP AB SCN SERPL QL: POSITIVE
BLOOD GROUP ANTIBODIES SERPL: NORMAL
BUN SERPL-MCNC: 24 MG/DL (ref 5–25)
C AG RBC QL: NEGATIVE
CALCIUM SERPL-MCNC: 8.8 MG/DL (ref 8.3–10.1)
CHLORIDE SERPL-SCNC: 98 MMOL/L (ref 100–108)
CLARITY UR: ABNORMAL
CO2 SERPL-SCNC: 36 MMOL/L (ref 21–32)
COLOR UR: ABNORMAL
CREAT SERPL-MCNC: 1.75 MG/DL (ref 0.6–1.3)
EOSINOPHIL # BLD MANUAL: 0.24 THOUSAND/UL (ref 0–0.4)
EOSINOPHIL NFR BLD MANUAL: 1 % (ref 0–6)
ERYTHROCYTE [DISTWIDTH] IN BLOOD BY AUTOMATED COUNT: 19.2 % (ref 11.6–15.1)
GFR SERPL CREATININE-BSD FRML MDRD: 28 ML/MIN/1.73SQ M
GLUCOSE SERPL-MCNC: 175 MG/DL (ref 65–140)
GLUCOSE UR STRIP-MCNC: NEGATIVE MG/DL
HCT VFR BLD AUTO: 34.1 % (ref 34.8–46.1)
HGB BLD-MCNC: 10 G/DL (ref 11.5–15.4)
HGB UR QL STRIP.AUTO: ABNORMAL
INR PPP: 2.74 (ref 0.86–1.16)
KELL GROUP AG RBC: NEGATIVE
KETONES UR STRIP-MCNC: NEGATIVE MG/DL
LACTATE SERPL-SCNC: 1.3 MMOL/L (ref 0.5–2)
LEUKOCYTE ESTERASE UR QL STRIP: ABNORMAL
LYMPHOCYTES # BLD AUTO: 21 % (ref 14–44)
LYMPHOCYTES # BLD AUTO: 4.94 THOUSAND/UL (ref 0.6–4.47)
MCH RBC QN AUTO: 27.5 PG (ref 26.8–34.3)
MCHC RBC AUTO-ENTMCNC: 29.3 G/DL (ref 31.4–37.4)
MCV RBC AUTO: 94 FL (ref 82–98)
METAMYELOCYTES NFR BLD MANUAL: 2 % (ref 0–1)
MONOCYTES # BLD AUTO: 1.18 THOUSAND/UL (ref 0–1.22)
MONOCYTES NFR BLD: 5 % (ref 4–12)
MYELOCYTES NFR BLD MANUAL: 1 % (ref 0–1)
NEUTROPHILS # BLD MANUAL: 16.46 THOUSAND/UL (ref 1.85–7.62)
NEUTS BAND NFR BLD MANUAL: 4 % (ref 0–8)
NEUTS SEG NFR BLD AUTO: 66 % (ref 43–75)
NITRITE UR QL STRIP: POSITIVE
NON-SQ EPI CELLS URNS QL MICRO: ABNORMAL /HPF
PH UR STRIP.AUTO: 7.5 [PH] (ref 4.5–8)
PLATELET # BLD AUTO: 430 THOUSANDS/UL (ref 149–390)
PLATELET BLD QL SMEAR: ABNORMAL
PMV BLD AUTO: 8.9 FL (ref 8.9–12.7)
POLYCHROMASIA BLD QL SMEAR: PRESENT
POTASSIUM SERPL-SCNC: 3.5 MMOL/L (ref 3.5–5.3)
PROT SERPL-MCNC: 7.4 G/DL (ref 6.4–8.2)
PROT UR STRIP-MCNC: >=300 MG/DL
PROTHROMBIN TIME: 28.9 SECONDS (ref 12.1–14.4)
RBC # BLD AUTO: 3.64 MILLION/UL (ref 3.81–5.12)
RBC #/AREA URNS AUTO: ABNORMAL /HPF
RBC MORPH BLD: PRESENT
RH BLD: NEGATIVE
SODIUM SERPL-SCNC: 144 MMOL/L (ref 136–145)
SP GR UR STRIP.AUTO: 1.02 (ref 1–1.03)
SPECIMEN EXPIRATION DATE: NORMAL
TOTAL CELLS COUNTED SPEC: 100
TROPONIN I SERPL-MCNC: 0.06 NG/ML
UROBILINOGEN UR QL STRIP.AUTO: 0.2 E.U./DL
WBC # BLD AUTO: 23.52 THOUSAND/UL (ref 4.31–10.16)
WBC #/AREA URNS AUTO: ABNORMAL /HPF

## 2017-11-14 PROCEDURE — 87040 BLOOD CULTURE FOR BACTERIA: CPT | Performed by: EMERGENCY MEDICINE

## 2017-11-14 PROCEDURE — 96365 THER/PROPH/DIAG IV INF INIT: CPT

## 2017-11-14 PROCEDURE — 96375 TX/PRO/DX INJ NEW DRUG ADDON: CPT

## 2017-11-14 PROCEDURE — 36415 COLL VENOUS BLD VENIPUNCTURE: CPT | Performed by: EMERGENCY MEDICINE

## 2017-11-14 PROCEDURE — 96361 HYDRATE IV INFUSION ADD-ON: CPT

## 2017-11-14 PROCEDURE — 81001 URINALYSIS AUTO W/SCOPE: CPT | Performed by: EMERGENCY MEDICINE

## 2017-11-14 PROCEDURE — 71010 HB CHEST X-RAY 1 VIEW FRONTAL (PORTABLE): CPT

## 2017-11-14 PROCEDURE — 86901 BLOOD TYPING SEROLOGIC RH(D): CPT | Performed by: EMERGENCY MEDICINE

## 2017-11-14 PROCEDURE — 85730 THROMBOPLASTIN TIME PARTIAL: CPT | Performed by: EMERGENCY MEDICINE

## 2017-11-14 PROCEDURE — 87086 URINE CULTURE/COLONY COUNT: CPT | Performed by: EMERGENCY MEDICINE

## 2017-11-14 PROCEDURE — 80053 COMPREHEN METABOLIC PANEL: CPT | Performed by: EMERGENCY MEDICINE

## 2017-11-14 PROCEDURE — 87070 CULTURE OTHR SPECIMN AEROBIC: CPT | Performed by: EMERGENCY MEDICINE

## 2017-11-14 PROCEDURE — 93005 ELECTROCARDIOGRAM TRACING: CPT | Performed by: EMERGENCY MEDICINE

## 2017-11-14 PROCEDURE — 86905 BLOOD TYPING RBC ANTIGENS: CPT

## 2017-11-14 PROCEDURE — 84484 ASSAY OF TROPONIN QUANT: CPT | Performed by: EMERGENCY MEDICINE

## 2017-11-14 PROCEDURE — 87186 SC STD MICRODIL/AGAR DIL: CPT | Performed by: EMERGENCY MEDICINE

## 2017-11-14 PROCEDURE — 86900 BLOOD TYPING SEROLOGIC ABO: CPT | Performed by: EMERGENCY MEDICINE

## 2017-11-14 PROCEDURE — 86927 PLASMA FRESH FROZEN: CPT

## 2017-11-14 PROCEDURE — 96367 TX/PROPH/DG ADDL SEQ IV INF: CPT

## 2017-11-14 PROCEDURE — 85027 COMPLETE CBC AUTOMATED: CPT | Performed by: EMERGENCY MEDICINE

## 2017-11-14 PROCEDURE — 85007 BL SMEAR W/DIFF WBC COUNT: CPT | Performed by: EMERGENCY MEDICINE

## 2017-11-14 PROCEDURE — 86870 RBC ANTIBODY IDENTIFICATION: CPT | Performed by: NURSE PRACTITIONER

## 2017-11-14 PROCEDURE — 87077 CULTURE AEROBIC IDENTIFY: CPT | Performed by: EMERGENCY MEDICINE

## 2017-11-14 PROCEDURE — 86850 RBC ANTIBODY SCREEN: CPT | Performed by: EMERGENCY MEDICINE

## 2017-11-14 PROCEDURE — 87205 SMEAR GRAM STAIN: CPT | Performed by: EMERGENCY MEDICINE

## 2017-11-14 PROCEDURE — 85610 PROTHROMBIN TIME: CPT | Performed by: EMERGENCY MEDICINE

## 2017-11-14 PROCEDURE — 87505 NFCT AGENT DETECTION GI: CPT | Performed by: EMERGENCY MEDICINE

## 2017-11-14 PROCEDURE — P9017 PLASMA 1 DONOR FRZ W/IN 8 HR: HCPCS

## 2017-11-14 PROCEDURE — 83605 ASSAY OF LACTIC ACID: CPT | Performed by: EMERGENCY MEDICINE

## 2017-11-14 RX ORDER — 0.9 % SODIUM CHLORIDE 0.9 %
3 VIAL (ML) INJECTION AS NEEDED
Status: DISCONTINUED | OUTPATIENT
Start: 2017-11-14 | End: 2017-11-15 | Stop reason: HOSPADM

## 2017-11-14 RX ORDER — LEVOFLOXACIN 5 MG/ML
500 INJECTION, SOLUTION INTRAVENOUS ONCE
Status: COMPLETED | OUTPATIENT
Start: 2017-11-14 | End: 2017-11-14

## 2017-11-14 RX ORDER — MORPHINE SULFATE 20 MG/5ML
5 SOLUTION ORAL EVERY 6 HOURS PRN
COMMUNITY
End: 2017-11-22 | Stop reason: HOSPADM

## 2017-11-14 RX ORDER — HYOSCYAMINE SULFATE 16 OZ
1 SOLUTION MISCELLANEOUS 3 TIMES DAILY
Status: ON HOLD | COMMUNITY
End: 2017-12-08

## 2017-11-14 RX ORDER — MAGNESIUM OXIDE/MAG AA CHELATE 133 MG
1 TABLET ORAL 2 TIMES DAILY
Status: ON HOLD | COMMUNITY
End: 2017-12-08

## 2017-11-14 RX ORDER — LIDOCAINE HYDROCHLORIDE AND EPINEPHRINE 10; 10 MG/ML; UG/ML
20 INJECTION, SOLUTION INFILTRATION; PERINEURAL ONCE
Status: DISCONTINUED | OUTPATIENT
Start: 2017-11-14 | End: 2017-11-14

## 2017-11-14 RX ORDER — DOXYCYCLINE HYCLATE 100 MG/1
100 CAPSULE ORAL 2 TIMES DAILY
COMMUNITY
End: 2017-12-08

## 2017-11-14 RX ORDER — MIDODRINE HYDROCHLORIDE 5 MG/1
5 TABLET ORAL 3 TIMES DAILY
Status: ON HOLD | COMMUNITY
End: 2017-11-22

## 2017-11-14 RX ORDER — INSULIN GLARGINE 100 [IU]/ML
35 INJECTION, SOLUTION SUBCUTANEOUS
Status: ON HOLD | COMMUNITY
End: 2017-11-22

## 2017-11-14 RX ORDER — WARFARIN SODIUM 3 MG/1
TABLET ORAL
COMMUNITY
End: 2017-11-14

## 2017-11-14 RX ORDER — LACOSAMIDE 10 MG/ML
100 SOLUTION ORAL 2 TIMES DAILY
Status: ON HOLD | COMMUNITY
End: 2017-11-22

## 2017-11-14 RX ORDER — ASPIRIN 81 MG/1
325 TABLET ORAL DAILY
Status: ON HOLD | COMMUNITY
End: 2017-12-08

## 2017-11-14 RX ADMIN — PHYTONADIONE 10 MG: 10 INJECTION, EMULSION INTRAMUSCULAR; INTRAVENOUS; SUBCUTANEOUS at 21:42

## 2017-11-14 RX ADMIN — LEVOFLOXACIN 500 MG: 5 INJECTION, SOLUTION INTRAVENOUS at 22:35

## 2017-11-14 RX ADMIN — SODIUM CHLORIDE 500 ML: 0.9 INJECTION, SOLUTION INTRAVENOUS at 17:45

## 2017-11-14 RX ADMIN — SODIUM CHLORIDE, PRESERVATIVE FREE 0.04 MG: 5 INJECTION INTRAVENOUS at 18:58

## 2017-11-14 NOTE — ED PROVIDER NOTES
History  Chief Complaint   Patient presents with    Bleeding/Bruising     Brought via EMS from dialysis for bleeding from dialysis that staff was unable to stop/control; pt on coumadin per EMS  All this is a 59-year-old female brought in by ambulance for a bleeding fistula  Symptoms started during dialysis just prior to arrival   They were unable to stop it with compression  She is on Coumadin and aspirin  She recently was hospitalized at [facility] for 77 days  She has a quicker town Center now  Her  states she is DNR DNI  He states that she normally does not talk much because of the pain medicine that she is taking  She has a graft of the right upper extremity from 1 year ago  It did bleed in the past and required stitch  Her vascular and nephrologist are from [facility] location  Prior to Admission Medications   Prescriptions Last Dose Informant Patient Reported? Taking?    B Complex-C-Folic Acid (LILIAM-BEE/C PO)   Yes Yes   Sig: Take 1 tablet by mouth   Bisacodyl (BISAC-EVAC RE)   Yes No   Sig: Insert 10 mg into the rectum as needed     Lidocaine-Prilocaine, Bulk, 2 5-2 5 % CREA   Yes No   Sig: Apply to Right upper extremities toothpaste thickness - do not rub in- cover with telfa and wrap with eileen one hour prior to dialysis on dialysis days     Specialty Vitamins Products (MAGNESIUM, AMINO ACID CHELATE,) 133 MG tablet   Yes Yes   Sig: Take 1 tablet by mouth 2 (two) times a day   acetaminophen (TYLENOL) 325 mg tablet   Yes No   Sig: Take 650 mg by mouth every 4 (four) hours as needed for mild pain   aspirin (ECOTRIN LOW STRENGTH) 81 mg EC tablet   Yes Yes   Sig: Take 81 mg by mouth daily     carbidopa-levodopa (SINEMET)  mg per tablet   Yes No   Sig: Take 1 tablet by mouth 3 (three) times a day   doxycycline hyclate (VIBRAMYCIN) 100 mg capsule   Yes Yes   Sig: Take 100 mg by mouth 2 (two) times a day   insulin glargine (LANTUS) 100 units/mL subcutaneous injection   Yes Yes Sig: Inject 35 Units under the skin daily at bedtime   ipratropium (ATROVENT HFA) 17 mcg/act inhaler   Yes Yes   Sig: Inhale 1 puff every morning   lacosamide (VIMPAT) 10 mg/mL   Yes Yes   Sig: Take 100 mg by mouth 2 (two) times a day   levothyroxine 75 mcg tablet   Yes No   Sig: Take 37 5 mcg by mouth daily at bedtime     metoprolol tartrate (LOPRESSOR) 25 mg tablet   Yes No   Sig: Take 25 mg by mouth 2 (two) times a day   miconazole 2 % cream   Yes Yes   Sig: Apply topically 2 (two) times a day   midodrine (PROAMATINE) 5 mg tablet   Yes Yes   Sig: Take 5 mg by mouth 3 (three) times a day   morphine 20 MG/5ML solution   Yes Yes   Sig: Take 5 mg by mouth every 6 (six) hours as needed for severe pain   nystatin (MYCOSTATIN) cream   No No   Sig: Apply 2 g topically 2 (two) times a day   sodium hypochlorite (HYSEPT) 0 25 %   Yes Yes   Sig: Irrigate with 1 application as directed 3 (three) times a day      Facility-Administered Medications: None       Past Medical History:   Diagnosis Date    Anemia     CHF (congestive heart failure) ([facility])     Diabetes mellitus (HCC)     Disease of thyroid gland     Hyperlipidemia     Hypertension     Lupus     Pacemaker     Parkinson's disease ([address] )     Pneumonia     Psychiatric disorder     Renal disorder     dialysis    UTI (urinary tract infection)     Vascular dialysis catheter in place [facility])        Past Surgical History:   Procedure Laterality Date    AMPUTATION      CARDIAC PACEMAKER PLACEMENT         Family History   Problem Relation Age of Onset    Heart disease Mother     Heart disease Father     Heart disease Brother      I have reviewed and agree with the history as documented      Social History   Substance Use Topics    Smoking status: Never Smoker    Smokeless tobacco: Never Used    Alcohol use No        Review of Systems   Unable to perform ROS: Patient nonverbal       Physical Exam  ED Triage Vitals [11/14/17 1718]   Temperature Pulse Respirations Blood Pressure SpO2   99 5 °F (37 5 °C) 97 15 120/56 99 %      Temp Source Heart Rate Source Patient Position - Orthostatic VS BP Location FiO2 (%)   Tympanic Monitor Lying Left arm --      Pain Score       No Pain           Orthostatic Vital Signs  Vitals:    11/14/17 2145 11/14/17 2200 11/14/17 2215 11/14/17 2300   BP: 170/73 162/69  134/62   Pulse: 66 71 62 62   Patient Position - Orthostatic VS:    Lying       Physical Exam   Constitutional: She appears well-developed  She appears distressed  HENT:   Mouth/Throat: Mucous membranes are dry  Eyes: Conjunctivae and EOM are normal  Pupils are equal, round, and reactive to light  Neck: Normal range of motion  Neck supple  Cardiovascular: Normal rate, regular rhythm and normal heart sounds  Exam reveals decreased pulses  Pulses:       Radial pulses are 1+ on the right side  Dorsalis pedis pulses are 1+ on the right side, and 0 on the left side  Pulmonary/Chest: Bradypnea noted  She has wheezes in the right upper field, the right middle field and the right lower field  Abdominal: Soft  Bowel sounds are normal  There is no tenderness  There is no guarding  Musculoskeletal:        Lumbar back: She exhibits tenderness and pain  Back:         Arms:  Large mid sacral decubitus down to bone about 8 cm diameter    Generalized weakness cannot sit up on her own, cannot lift legs up on her own   Neurological: She is alert  Skin: Skin is warm and dry  There is pallor  Nursing note and vitals reviewed        ED Medications  Medications   sodium chloride 0 9 % bolus 500 mL (0 mL Intravenous Stopped 11/14/17 1845)   phytonadione (AQUA-MEPHYTON) 10 mg/mL 10 mg in sodium chloride 0 9 % 50 mL IVPB (0 mg Intravenous Stopped 11/14/17 2216)   naloxone (NARCAN) 0 04 mg/mL syringe 0 04 mg (0 04 mg Intravenous Given 11/14/17 1858)   levofloxacin (LEVAQUIN) IVPB (premix) 500 mg (0 mg Intravenous Stopped 11/14/17 2255)       Diagnostic Studies  Results Reviewed     Procedure Component Value Units Date/Time    Stool Enteric Bacterial Panel by PCR [[id number]]  (Normal) Collected:  11/14/17 1856    Lab Status:  Final result Specimen:  Stool from Rectum Updated:  11/15/17 0626     Salmonella sp PCR None Detected     Shigella sp/Enteroinvasive E  coli (EIEC) PCR None Detected     Campylobacter sp (jejuni and coli) PCR None Detected     Shiga toxin 1/Shiga tonix 2 genes PCR None Detected    Fingerstick Glucose (POCT) [[id number]]  (Normal) Collected:  11/15/17 0545    Lab Status:  Final result Updated:  11/15/17 0554     POC Glucose 103 mg/dl     Urine Microscopic [[id number]]  (Abnormal) Collected:  11/14/17 2230    Lab Status:  Final result Specimen:  Urine from Urine, Indwelling Barker Catheter Updated:  11/14/17 2252     RBC, UA Innumerable (A) /hpf      WBC, UA Innumerable (A) /hpf      Epithelial Cells  /hpf      Field obscured, unable to enumerate (A)     Bacteria, UA Innumerable (A) /hpf     Urine culture [[id number]] Collected:  11/14/17 2230    Lab Status: In process Specimen:  Urine from Urine, Indwelling Barker Catheter Updated:  11/14/17 2252    UA w Reflex to Microscopic w Reflex to Culture [[id number]]  (Abnormal) Collected:  11/14/17 2230    Lab Status:  Final result Specimen:  Urine from Urine, Indwelling Barker Catheter Updated:  11/14/17 2250     Color, UA Brown     Clarity, UA Turbid     Specific Gravity, UA 1 025     pH, UA 7 5     Leukocytes, UA Large (A)     Nitrite, UA Positive (A)     Protein, UA >=300 (A) mg/dl      Glucose, UA Negative mg/dl      Ketones, UA Negative mg/dl      Urobilinogen, UA 0 2 E U /dl      Bilirubin, UA Interference- unable to analyze (A)     Blood, UA Large (A)    Lactic acid x2 Q2H [[id number]]  (Normal) Collected:  11/14/17 1727    Lab Status:  Final result Specimen:  Blood Updated:  11/14/17 2231     LACTIC ACID 1 3 mmol/L     Narrative:         Result may be elevated if tourniquet was used during collection      Wound culture and Gram stain [[id number]] Collected:  11/14/17 1856    Lab Status: In process Specimen:  Wound from Sacrum Updated:  11/14/17 1920    Clostridium difficile toxin by PCR [[id number]]     Lab Status:  No result Specimen:  Stool from Per Rectum     Blood culture #1 [[id number]]     Lab Status:  No result Specimen:  Blood     Blood culture #2 [[id number]]     Lab Status:  No result Specimen:  Blood     CBC and differential [[id number]]  (Abnormal) Collected:  11/14/17 1727    Lab Status:  Final result Specimen:  Blood from Hand, Right Updated:  11/14/17 1811     WBC 23 52 (H) Thousand/uL      RBC 3 64 (L) Million/uL      Hemoglobin 10 0 (L) g/dL      Hematocrit 34 1 (L) %      MCV 94 fL      MCH 27 5 pg      MCHC 29 3 (L) g/dL      RDW 19 2 (H) %      MPV 8 9 fL      Platelets 874 (H) Thousands/uL     Narrative: This is an appended report  These results have been appended to a previously verified report  Comprehensive metabolic panel [[id number]]  (Abnormal) Collected:  11/14/17 1727    Lab Status:  Final result Specimen:  Blood from Hand, Right Updated:  11/14/17 1757     Sodium 144 mmol/L      Potassium 3 5 mmol/L      Chloride 98 (L) mmol/L      CO2 36 (H) mmol/L      Anion Gap 10 mmol/L      BUN 24 mg/dL      Creatinine 1 75 (H) mg/dL      Glucose 175 (H) mg/dL      Calcium 8 8 mg/dL      AST 19 U/L      ALT 9 (L) U/L      Alkaline Phosphatase 147 (H) U/L      Total Protein 7 4 g/dL      Albumin 2 8 (L) g/dL      Total Bilirubin 0 50 mg/dL      eGFR 28 ml/min/1 73sq m     Narrative:         National Kidney Disease Education Program recommendations are as follows:  GFR calculation is accurate only with a steady state creatinine  Chronic Kidney disease less than 60 ml/min/1 73 sq  meters  Kidney failure less than 15 ml/min/1 73 sq  meters      Troponin I [[id number]]  (Abnormal) Collected:  11/14/17 1727    Lab Status:  Final result Specimen:  Blood from Arm, Right Updated:  11/14/17 1753     Troponin I 0 06 (H) ng/mL Narrative:         [employer] Chemistry analyzer 99% cutoff is > 0 04 ng/mL in network labs    o cTnI 99% cutoff is useful only when applied to patients in the clinical setting of myocardial ischemia  o cTnI 99% cutoff should be interpreted in the context of clinical history, ECG findings and possibly cardiac imaging to establish correct diagnosis  o cTnI 99% cutoff may be suggestive but clearly not indicative of a coronary event without the clinical setting of myocardial ischemia  Protime-INR [[id number]]  (Abnormal) Collected:  11/14/17 1727    Lab Status:  Final result Specimen:  Blood from Arm, Right Updated:  11/14/17 1747     Protime 28 9 (H) seconds      INR 2 74 (H)    APTT [[id number]]  (Abnormal) Collected:  11/14/17 1727    Lab Status:  Final result Specimen:  Blood from Arm, Right Updated:  11/14/17 1747     PTT 50 (H) seconds     Narrative: Therapeutic Heparin Range = 60-90 seconds                 XR chest portable   ED Interpretation by Sunita Hu DO (11/14 1931)   No acute abnl read by me      Final Result by Michelle English MD (82/49 0514)      No active pulmonary disease           Workstation performed: [id number]                    Procedures  ECG 12 Lead Documentation  Date/Time: 11/14/2017 6:20 PM  Performed by: Ken Oviedo  Authorized by: Ken Oviedo     Indications / Diagnosis:  Hypoxia  ECG reviewed by me, the ED Provider: yes    Patient location:  ED  Previous ECG:     Previous ECG:  Compared to current    Comparison ECG info:  5-17    Similarity:  Changes noted  Interpretation:     Interpretation: abnormal    Quality:     Tracing quality:  Limited by artifact  Rate:     ECG rate:  98    ECG rate assessment: normal    Rhythm:     Rhythm: junctional    Ectopy:     Ectopy: none    QRS:     QRS intervals:  Normal  Conduction:     Conduction: abnormal      Abnormal conduction: complete RBBB    ST segments:     ST segments:  Normal  T waves:     T waves: normal      CriticalCare Time  Performed by: Natale Olszewski  Authorized by: Natale Olszewski     Critical care provider statement:     Critical care time (minutes):  30    Critical care time was exclusive of:  Separately billable procedures and treating other patients and teaching time    Critical care was necessary to treat or prevent imminent or life-threatening deterioration of the following conditions:  Circulatory failure and sepsis    Critical care was time spent personally by me on the following activities:  Obtaining history from patient or surrogate, development of treatment plan with patient or surrogate, discussions with consultants, evaluation of patient's response to treatment, examination of patient, review of old charts, re-evaluation of patient's condition, ordering and review of radiographic studies, ordering and review of laboratory studies and ordering and performing treatments and interventions       Right upper arm graft site bleeding direct pressure attempted with fingers but unable to stop bleeding  Direct pressure with gauze and ace wrap applied that stopped the bleeding after 30 minutes  Phone Contacts  ED Phone Contact    ED Course  ED Course as of Nov 15 0828   Tue Nov 14, 2017   8756 I spoke with Dr Richards who requested patient be transferred to [facility] for bleeding graft  Bleeding stopped but I continued with transfer as further bleeding my occur and patient may need dialysis during hospitalization    1959 Signed out to A  Flora Higgins NP        Extensive conversation with  about placing central line  He states that they were unable to do that at [facility] [place] as she has a graft/ stent on right side and pacemaker on left side  She has had several fistula revisions on left arm  I am concerned with placing central line at groin as patient had large amount of diarrhea upon arrival to emergency room and would risk causing infection                              MDM  Number of Diagnoses or Management Options  Coagulopathy ([facility] ): new and requires workup  Diarrhea: new and requires workup  Hemorrhage: new and requires workup  Sacral decubitus ulcer: established and worsening  Sepsis ([facility] ): new and requires workup     Amount and/or Complexity of Data Reviewed  Clinical lab tests: ordered and reviewed  Tests in the radiology section of CPT®: ordered and reviewed  Obtain history from someone other than the patient: yes  Discuss the patient with other providers: yes    Patient Progress  Patient progress: improved    The patient presented with a condition in which there was a high probability of imminent or life-threatening deterioration, and critical care services (excluding separately billable procedures) totalled 30-74 minutes          Disposition  Final diagnoses:   Hemorrhage - acute right upper arm graft site   Sepsis ([facility] )   Diarrhea   Sacral decubitus ulcer   Coagulopathy ([facility] )     Time reflects when diagnosis was documented in both MDM as applicable and the Disposition within this note     Time User Action Codes Description Comment    11/14/2017  7:26 PM Malejostin Shie Add [R58] Hemorrhage     11/14/2017  7:26 PM Sunita Alba [R58] Hemorrhage acute right upper arm graft site    11/14/2017  7:26 PM Hiral Booze [A41 9] Sepsis ([facility] )     11/14/2017  7:26 PM Malejostin Shie Add [R19 7] Diarrhea     11/14/2017  7:26 PM [address] Sacral decubitus ulcer     11/15/2017  8:28 AM Darrel English Add [D68 9] Coagulopathy [facility])       ED Disposition     ED Disposition Condition Comment    Transfer to Another [facility] should be transferred out to *B**       MD Documentation    Flowsheet Row Most Recent Value   Patient Condition  The patient has been stabilized such that within reasonable medical probability, no material deterioration of the patient condition or the condition of the unborn child(joe) is likely to result from the transfer   Reason for Transfer  Level of Care needed not available at this facility   Benefits of Transfer  Specialized equipment and/or services available at the receiving facility (Include comment)________________________ [nephrology and vascular surgery]   Risks of Transfer  Potential for delay in receiving treatment, Potential deterioration of medical condition, Loss of IV, Increased discomfort during transfer, Possible worsening of condition or death during transfer   Accepting Physician  [address] [facility] Name, [place], [place]    (Name & Tel number)  Clearnce Mouse   Sending MD Amanda Castellon Do   Provider Certification  General risk, such as traffic hazards, adverse weather conditions, rough terrain or turbulence, possible failure of equipment (including vehicle or aircraft), or consequences of actions of persons outside the control of the transport personnel      RN Documentation    Flowsheet Row Most [facility] Name, [place], [place]    (Name & Tel number)  Ira Lala [phone]   Report Given to  [facility]      Follow-up Information    None       Discharge Medication List as of 11/15/2017 12:13 AM      CONTINUE these medications which have NOT CHANGED    Details   aspirin (ECOTRIN LOW STRENGTH) 81 mg EC tablet Take 81 mg by mouth daily, Historical Med      B Complex-C-Folic Acid (LILIAM-BEE/C PO) Take 1 tablet by mouth, Historical Med      doxycycline hyclate (VIBRAMYCIN) 100 mg capsule Take 100 mg by mouth 2 (two) times a day, Historical Med      insulin glargine (LANTUS) 100 units/mL subcutaneous injection Inject 35 Units under the skin daily at bedtime, Historical Med      ipratropium (ATROVENT HFA) 17 mcg/act inhaler Inhale 1 puff every morning, Historical Med      lacosamide (VIMPAT) 10 mg/mL Take 100 mg by mouth 2 (two) times a day, Historical Med      miconazole 2 % cream Apply topically 2 (two) times a day, Historical Med      midodrine (PROAMATINE) 5 mg tablet Take 5 mg by mouth 3 (three) times a day, Historical Med      morphine 20 MG/5ML solution Take 5 mg by mouth every 6 (six) hours as needed for severe pain, Historical Med      sodium hypochlorite (HYSEPT) 0 25 % Irrigate with 1 application as directed 3 (three) times a day, Historical Med      Specialty Vitamins Products (MAGNESIUM, AMINO ACID CHELATE,) 133 MG tablet Take 1 tablet by mouth 2 (two) times a day, Historical Med      acetaminophen (TYLENOL) 325 mg tablet Take 650 mg by mouth every 4 (four) hours as needed for mild pain, Until Discontinued, Historical Med      Bisacodyl (BISAC-EVAC RE) Insert 10 mg into the rectum as needed  , Historical Med      carbidopa-levodopa (SINEMET)  mg per tablet Take 1 tablet by mouth 3 (three) times a day, Until Discontinued, Historical Med      levothyroxine 75 mcg tablet Take 37 5 mcg by mouth daily at bedtime  , Historical Med      Lidocaine-Prilocaine, Bulk, 2 5-2 5 % CREA Apply to Right upper extremities toothpaste thickness - do not rub in- cover with telfa and wrap with eileen one hour prior to dialysis on dialysis days  , Historical Med      metoprolol tartrate (LOPRESSOR) 25 mg tablet Take 25 mg by mouth 2 (two) times a day, Until Discontinued, Historical Med      nystatin (MYCOSTATIN) cream Apply 2 g topically 2 (two) times a day, Starting 6/9/2017, Until Discontinued, Print           No discharge procedures on file      ED Provider  Electronically Signed by           Joellen Yu DO  11/15/17 0196

## 2017-11-15 ENCOUNTER — HOSPITAL ENCOUNTER (INPATIENT)
Facility: HOSPITAL | Age: 77
LOS: 7 days | Discharge: NURSING, REHAB OR LONG-TERM CARE | DRG: 853 | End: 2017-11-22
Attending: HOSPITALIST | Admitting: HOSPITALIST
Payer: MEDICARE

## 2017-11-15 ENCOUNTER — GENERIC CONVERSION - ENCOUNTER (OUTPATIENT)
Dept: OTHER | Facility: OTHER | Age: 77
End: 2017-11-15

## 2017-11-15 DIAGNOSIS — N18.6 ESRD (END STAGE RENAL DISEASE) ON DIALYSIS (HCC): Primary | ICD-10-CM

## 2017-11-15 DIAGNOSIS — Z99.2 ESRD (END STAGE RENAL DISEASE) ON DIALYSIS (HCC): Primary | ICD-10-CM

## 2017-11-15 DIAGNOSIS — L89.154 DECUBITUS ULCER OF SACRAL REGION, STAGE 4 (HCC): ICD-10-CM

## 2017-11-15 DIAGNOSIS — L89.320 PRESSURE ULCER OF LEFT BUTTOCK, UNSTAGEABLE (HCC): Chronic | ICD-10-CM

## 2017-11-15 PROBLEM — T82.838A BLEEDING PSEUDOANEURYSM OF RIGHT BRACHIOCEPHALIC AV FISTULA (HCC): Status: ACTIVE | Noted: 2017-11-15

## 2017-11-15 PROBLEM — N30.00 ACUTE CYSTITIS: Status: ACTIVE | Noted: 2017-11-15

## 2017-11-15 LAB
ABO GROUP BLD BPU: NORMAL
ABO GROUP BLD BPU: NORMAL
ALBUMIN SERPL BCP-MCNC: 2.7 G/DL (ref 3.5–5)
ALP SERPL-CCNC: 115 U/L (ref 46–116)
ALT SERPL W P-5'-P-CCNC: 9 U/L (ref 12–78)
ANION GAP SERPL CALCULATED.3IONS-SCNC: 6 MMOL/L (ref 4–13)
AST SERPL W P-5'-P-CCNC: 24 U/L (ref 5–45)
ATRIAL RATE: 98 BPM
BILIRUB SERPL-MCNC: 0.72 MG/DL (ref 0.2–1)
BPU ID: NORMAL
BPU ID: NORMAL
BUN SERPL-MCNC: 36 MG/DL (ref 5–25)
CALCIUM SERPL-MCNC: 8.5 MG/DL (ref 8.3–10.1)
CAMPYLOBACTER DNA SPEC NAA+PROBE: NORMAL
CHLORIDE SERPL-SCNC: 100 MMOL/L (ref 100–108)
CO2 SERPL-SCNC: 35 MMOL/L (ref 21–32)
CREAT SERPL-MCNC: 2.34 MG/DL (ref 0.6–1.3)
ERYTHROCYTE [DISTWIDTH] IN BLOOD BY AUTOMATED COUNT: 18.2 % (ref 11.6–15.1)
EST. AVERAGE GLUCOSE BLD GHB EST-MCNC: 140 MG/DL
GFR SERPL CREATININE-BSD FRML MDRD: 19 ML/MIN/1.73SQ M
GLUCOSE SERPL-MCNC: 101 MG/DL (ref 65–140)
GLUCOSE SERPL-MCNC: 103 MG/DL (ref 65–140)
GLUCOSE SERPL-MCNC: 105 MG/DL (ref 65–140)
GLUCOSE SERPL-MCNC: 120 MG/DL (ref 65–140)
GLUCOSE SERPL-MCNC: 165 MG/DL (ref 65–140)
GLUCOSE SERPL-MCNC: 93 MG/DL (ref 65–140)
HBA1C MFR BLD: 6.5 % (ref 4.2–6.3)
HCT VFR BLD AUTO: 25.1 % (ref 34.8–46.1)
HGB BLD-MCNC: 7.1 G/DL (ref 11.5–15.4)
INR PPP: 1.41 (ref 0.86–1.16)
MAGNESIUM SERPL-MCNC: 2.4 MG/DL (ref 1.6–2.6)
MCH RBC QN AUTO: 26.5 PG (ref 26.8–34.3)
MCHC RBC AUTO-ENTMCNC: 28.3 G/DL (ref 31.4–37.4)
MCV RBC AUTO: 94 FL (ref 82–98)
P AXIS: 65 DEGREES
PHOSPHATE SERPL-MCNC: 3.3 MG/DL (ref 2.3–4.1)
PLATELET # BLD AUTO: 308 THOUSANDS/UL (ref 149–390)
PMV BLD AUTO: 8.8 FL (ref 8.9–12.7)
POTASSIUM SERPL-SCNC: 3.5 MMOL/L (ref 3.5–5.3)
PR INTERVAL: 246 MS
PROT SERPL-MCNC: 6.6 G/DL (ref 6.4–8.2)
PROTHROMBIN TIME: 17.3 SECONDS (ref 12.1–14.4)
QRS AXIS: -43 DEGREES
QRSD INTERVAL: 126 MS
QT INTERVAL: 400 MS
QTC INTERVAL: 510 MS
RBC # BLD AUTO: 2.68 MILLION/UL (ref 3.81–5.12)
SALMONELLA DNA SPEC QL NAA+PROBE: NORMAL
SHIGA TOXIN STX GENE SPEC NAA+PROBE: NORMAL
SHIGELLA DNA SPEC QL NAA+PROBE: NORMAL
SODIUM SERPL-SCNC: 141 MMOL/L (ref 136–145)
T WAVE AXIS: -20 DEGREES
TSH SERPL DL<=0.05 MIU/L-ACNC: 2.71 UIU/ML (ref 0.36–3.74)
UNIT DISPENSE STATUS: NORMAL
UNIT DISPENSE STATUS: NORMAL
UNIT PRODUCT CODE: NORMAL
UNIT PRODUCT CODE: NORMAL
UNIT RH: NORMAL
UNIT RH: NORMAL
VENTRICULAR RATE: 98 BPM
WBC # BLD AUTO: 17.2 THOUSAND/UL (ref 4.31–10.16)

## 2017-11-15 PROCEDURE — 83735 ASSAY OF MAGNESIUM: CPT | Performed by: INTERNAL MEDICINE

## 2017-11-15 PROCEDURE — 85610 PROTHROMBIN TIME: CPT | Performed by: HOSPITALIST

## 2017-11-15 PROCEDURE — 84100 ASSAY OF PHOSPHORUS: CPT | Performed by: INTERNAL MEDICINE

## 2017-11-15 PROCEDURE — 80053 COMPREHEN METABOLIC PANEL: CPT | Performed by: INTERNAL MEDICINE

## 2017-11-15 PROCEDURE — 94762 N-INVAS EAR/PLS OXIMTRY CONT: CPT

## 2017-11-15 PROCEDURE — 83036 HEMOGLOBIN GLYCOSYLATED A1C: CPT | Performed by: HOSPITALIST

## 2017-11-15 PROCEDURE — 82948 REAGENT STRIP/BLOOD GLUCOSE: CPT

## 2017-11-15 PROCEDURE — 0JB70ZZ EXCISION OF BACK SUBCUTANEOUS TISSUE AND FASCIA, OPEN APPROACH: ICD-10-PCS | Performed by: SURGERY

## 2017-11-15 PROCEDURE — 84443 ASSAY THYROID STIM HORMONE: CPT | Performed by: HOSPITALIST

## 2017-11-15 PROCEDURE — 87081 CULTURE SCREEN ONLY: CPT | Performed by: INTERNAL MEDICINE

## 2017-11-15 PROCEDURE — 36430 TRANSFUSION BLD/BLD COMPNT: CPT

## 2017-11-15 PROCEDURE — 85027 COMPLETE CBC AUTOMATED: CPT | Performed by: INTERNAL MEDICINE

## 2017-11-15 PROCEDURE — 99285 EMERGENCY DEPT VISIT HI MDM: CPT

## 2017-11-15 RX ORDER — LACOSAMIDE 10 MG/ML
100 SOLUTION ORAL 2 TIMES DAILY
Status: DISCONTINUED | OUTPATIENT
Start: 2017-11-15 | End: 2017-11-22 | Stop reason: HOSPADM

## 2017-11-15 RX ORDER — INSULIN GLARGINE 100 [IU]/ML
35 INJECTION, SOLUTION SUBCUTANEOUS
Status: DISCONTINUED | OUTPATIENT
Start: 2017-11-15 | End: 2017-11-15

## 2017-11-15 RX ORDER — HYOSCYAMINE SULFATE 16 OZ
1 SOLUTION MISCELLANEOUS 3 TIMES DAILY
Status: DISCONTINUED | OUTPATIENT
Start: 2017-11-15 | End: 2017-11-22 | Stop reason: HOSPADM

## 2017-11-15 RX ORDER — ONDANSETRON 2 MG/ML
4 INJECTION INTRAMUSCULAR; INTRAVENOUS EVERY 6 HOURS PRN
Status: DISCONTINUED | OUTPATIENT
Start: 2017-11-15 | End: 2017-11-22 | Stop reason: HOSPADM

## 2017-11-15 RX ORDER — SODIUM CHLORIDE FOR INHALATION 0.9 %
3 VIAL, NEBULIZER (ML) INHALATION EVERY 4 HOURS PRN
Status: DISCONTINUED | OUTPATIENT
Start: 2017-11-15 | End: 2017-11-15

## 2017-11-15 RX ORDER — LEVOFLOXACIN 5 MG/ML
500 INJECTION, SOLUTION INTRAVENOUS EVERY 24 HOURS
Status: DISCONTINUED | OUTPATIENT
Start: 2017-11-15 | End: 2017-11-15

## 2017-11-15 RX ORDER — MORPHINE SULFATE 100 MG/5ML
5 SOLUTION ORAL EVERY 6 HOURS PRN
Status: DISCONTINUED | OUTPATIENT
Start: 2017-11-15 | End: 2017-11-18

## 2017-11-15 RX ORDER — LEVOTHYROXINE SODIUM 0.07 MG/1
37.5 TABLET ORAL
Status: DISCONTINUED | OUTPATIENT
Start: 2017-11-15 | End: 2017-11-22 | Stop reason: HOSPADM

## 2017-11-15 RX ORDER — MIDODRINE HYDROCHLORIDE 5 MG/1
5 TABLET ORAL 3 TIMES DAILY
Status: DISCONTINUED | OUTPATIENT
Start: 2017-11-15 | End: 2017-11-15

## 2017-11-15 RX ORDER — DOXYCYCLINE HYCLATE 100 MG/1
100 CAPSULE ORAL 2 TIMES DAILY
Status: DISCONTINUED | OUTPATIENT
Start: 2017-11-15 | End: 2017-11-15

## 2017-11-15 RX ORDER — LEVOTHYROXINE SODIUM 0.07 MG/1
37.5 TABLET ORAL
Status: DISCONTINUED | OUTPATIENT
Start: 2017-11-15 | End: 2017-11-15

## 2017-11-15 RX ORDER — ACETAMINOPHEN 325 MG/1
650 TABLET ORAL EVERY 4 HOURS PRN
Status: DISCONTINUED | OUTPATIENT
Start: 2017-11-15 | End: 2017-11-15

## 2017-11-15 RX ORDER — BISACODYL 10 MG
10 SUPPOSITORY, RECTAL RECTAL AS NEEDED
Status: DISCONTINUED | OUTPATIENT
Start: 2017-11-15 | End: 2017-11-22 | Stop reason: HOSPADM

## 2017-11-15 RX ORDER — WARFARIN SODIUM 3 MG/1
3 TABLET ORAL
COMMUNITY
End: 2017-11-22 | Stop reason: HOSPADM

## 2017-11-15 RX ORDER — DOCUSATE SODIUM 100 MG/1
100 CAPSULE, LIQUID FILLED ORAL 2 TIMES DAILY PRN
Status: DISCONTINUED | OUTPATIENT
Start: 2017-11-15 | End: 2017-11-16

## 2017-11-15 RX ORDER — MIDODRINE HYDROCHLORIDE 5 MG/1
5 TABLET ORAL 3 TIMES DAILY
Status: DISCONTINUED | OUTPATIENT
Start: 2017-11-15 | End: 2017-11-22 | Stop reason: HOSPADM

## 2017-11-15 RX ORDER — GUAIFENESIN 100 MG/5ML
200 SOLUTION ORAL EVERY 6 HOURS
Status: DISCONTINUED | OUTPATIENT
Start: 2017-11-15 | End: 2017-11-22 | Stop reason: HOSPADM

## 2017-11-15 RX ORDER — ACETAMINOPHEN 325 MG/1
650 TABLET ORAL EVERY 4 HOURS PRN
Status: DISCONTINUED | OUTPATIENT
Start: 2017-11-15 | End: 2017-11-22 | Stop reason: HOSPADM

## 2017-11-15 RX ORDER — LACOSAMIDE 10 MG/ML
100 SOLUTION ORAL 2 TIMES DAILY
Status: DISCONTINUED | OUTPATIENT
Start: 2017-11-15 | End: 2017-11-15

## 2017-11-15 RX ORDER — DOXYCYCLINE HYCLATE 100 MG/1
100 CAPSULE ORAL 2 TIMES DAILY
Status: DISCONTINUED | OUTPATIENT
Start: 2017-11-15 | End: 2017-11-22 | Stop reason: HOSPADM

## 2017-11-15 RX ORDER — CHOLECALCIFEROL (VITAMIN D3) 10 MCG
1 TABLET ORAL
Status: DISCONTINUED | OUTPATIENT
Start: 2017-11-15 | End: 2017-11-15

## 2017-11-15 RX ORDER — OMEPRAZOLE 20 MG/1
20 CAPSULE, DELAYED RELEASE ORAL DAILY
COMMUNITY
End: 2017-11-22 | Stop reason: HOSPADM

## 2017-11-15 RX ORDER — MORPHINE SULFATE 100 MG/5ML
5 SOLUTION ORAL EVERY 6 HOURS PRN
Status: DISCONTINUED | OUTPATIENT
Start: 2017-11-15 | End: 2017-11-15

## 2017-11-15 RX ADMIN — IPRATROPIUM BROMIDE 2 PUFF: 17 AEROSOL, METERED RESPIRATORY (INHALATION) at 17:50

## 2017-11-15 RX ADMIN — IPRATROPIUM BROMIDE 2 PUFF: 17 AEROSOL, METERED RESPIRATORY (INHALATION) at 23:28

## 2017-11-15 RX ADMIN — MIDODRINE HYDROCHLORIDE 5 MG: 5 TABLET ORAL at 10:19

## 2017-11-15 RX ADMIN — MIDODRINE HYDROCHLORIDE 5 MG: 5 TABLET ORAL at 23:28

## 2017-11-15 RX ADMIN — GUAIFENESIN 200 MG: 100 SOLUTION ORAL at 20:06

## 2017-11-15 RX ADMIN — METOPROLOL TARTRATE 25 MG: 25 TABLET ORAL at 10:19

## 2017-11-15 RX ADMIN — HYOSCYAMINE SULFATE 1 APPLICATION: 16 SOLUTION at 23:25

## 2017-11-15 RX ADMIN — IPRATROPIUM BROMIDE 2 PUFF: 17 AEROSOL, METERED RESPIRATORY (INHALATION) at 10:19

## 2017-11-15 RX ADMIN — MICONAZOLE NITRATE: 20 CREAM TOPICAL at 10:20

## 2017-11-15 RX ADMIN — LEVOTHYROXINE SODIUM 37.5 MCG: 75 TABLET ORAL at 23:26

## 2017-11-15 RX ADMIN — MICONAZOLE NITRATE: 20 CREAM TOPICAL at 17:50

## 2017-11-15 RX ADMIN — MIDODRINE HYDROCHLORIDE 5 MG: 5 TABLET ORAL at 17:49

## 2017-11-15 RX ADMIN — CARBIDOPA AND LEVODOPA 1 TABLET: 25; 100 TABLET ORAL at 23:28

## 2017-11-15 RX ADMIN — DOXYCYCLINE 100 MG: 100 CAPSULE ORAL at 10:19

## 2017-11-15 RX ADMIN — Medication 100 MG: at 10:19

## 2017-11-15 RX ADMIN — CARBIDOPA AND LEVODOPA 1 TABLET: 25; 100 TABLET ORAL at 17:49

## 2017-11-15 RX ADMIN — Medication 100 MG: at 17:49

## 2017-11-15 RX ADMIN — DOXYCYCLINE 100 MG: 100 CAPSULE ORAL at 17:49

## 2017-11-15 RX ADMIN — CARBIDOPA AND LEVODOPA 1 TABLET: 25; 100 TABLET ORAL at 10:19

## 2017-11-15 NOTE — PROGRESS NOTES
[facility] Internal Medicine Progress Note  Patient: Kandy Spurling 68 y o  female   MRN: [id number]  PCP: Vipul Mckeon DO  Unit/Bed#: [facility] 463-66 Encounter: [id number]  Date Of Visit: 11/15/17    Assessment:    Principal Problem:    Pressure ulcer of left buttock, unstageable ([facility])  Active Problems:    Type 2 diabetes mellitus with renal manifestations ([facility] )    ESRD (end stage renal disease) on dialysis ([facility] )    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 ([facility] )    Acute cystitis      Plan:    · SIRS POA -   · ID recommends monitoring off antibiotics  Will monitor temperatures, WBC counts, and symptoms  · Per [facility] from [facility], patient is listed as being on doxycycline for diagnosis of "endocarditis"  · Chronic  Stage IV Sacral Decubitus Ulcer POA -   · Follow up Wound culture done in the ER in [facility]  · Surgery consultation pending  · Asymptomatic Pyuria - ID recommends following off antibiotics  I also spoke with the  who states patient still has a stent placed for "kidney blockage" in August   I will discuss this information with ID  Follow up urine culture from admission  · Bleeding from AV Graft Site - Will consult vascular surgery to evaluate this  FFP given earlier  · Anemia of Chronic Disease - the hemoglobin of 10 appears to be the outlayer as most hemoglobins are 7 0 - 8 0  H/H later today  CBC in am   · ESRD on HD - Nephrology following  · Diabetes Mellitus Type 2 - Continue current regimen  Monitor blood glucose levels  · Acute Encephalopathy -   · Check NH3, B12   · Negative testing - TSH  · Monitor for any infections  · Medication Review - sinemet, vimpat, PRN morphine are all potential contributors  · Consider neurologist evaluation if symptoms persist   · Dysphagia - The patient is normally on tube feeding only (NPO)    Tueb feeds prehospital were nepro at 50 ml/hr and FWF of 60 ml every 6 hours per my discussion with staff at [facility]  Aspiration precautions with HOB > 30 degrees at all times  · Level of Care - Currently Level 2 Stepdown  VTE Pharmacologic Prophylaxis:   Pharmacologic: coagulopathy POA  Mechanical VTE Prophylaxis in Place: Yes    Patient Centered Rounds: I have performed bedside rounds with nursing staff today  Discussions with Specialists or Other Care Team Provider: None yet  Education and Discussions with Family / Patient: Patient's , Damon Huntley, Updated via phone at   Time Spent for Care: 30 minutes  More than 50% of total time spent on counseling and coordination of care as described above  Current Length of Stay: 0 day(s)    Current Patient Status: Inpatient   Certification Statement: The patient will continue to require additional inpatient hospital stay due to evaluations for the SIRS response, bleeding fistula, and pressure ulcer  Discharge Plan / Estimated Discharge Date: to be determined  Code Status: Level 3 - DNAR and DNI      Subjective: The patient is lethargic currently and does not answer any of my questions  The patient's  states she gets like that at times and actually usually does better on dialysis days  The patient has no pain complaints currently  Objective:     Vitals:   Temp (24hrs), Av °F (37 2 °C), Min:98 2 °F (36 8 °C), Max:99 5 °F (37 5 °C)    HR:  [] 74  Resp:  [11-36] 17  BP: ()/(39-91) 111/52  SpO2:  [71 %-100 %] 100 %  Body mass index is 26 3 kg/m²  Input and Output Summary (last 24 hours): Intake/Output Summary (Last 24 hours) at 11/15/17 1010  Last data filed at 11/15/17 0550   Gross per 24 hour   Intake              500 ml   Output               10 ml   Net              490 ml       Physical Exam:     Physical Exam   Constitutional: No distress  HENT:   Dry oral mucosa  Eyes: Conjunctivae are normal  Pupils are equal, round, and reactive to light  Neck: No JVD present     Cardiovascular: Normal rate and regular rhythm  Pulmonary/Chest: She has no wheezes  She has no rales  Decreased breath sounds   Abdominal: Soft  Bowel sounds are normal  She exhibits no distension  There is no tenderness  PEG site intact  No erythema or drainage  Musculoskeletal: She exhibits no edema  Neurological:   Grossly lethargic, but will follow simple commands like squeeze fingers, and opens eyes to questions but will not verbalize as she closes eyes again almost immediately  Skin: No rash noted  Vitals reviewed  Additional Data:     Labs:      Results from last 7 days  Lab Units 11/15/17  0546 11/14/17  1727   WBC Thousand/uL 17 20* 23 52*   HEMOGLOBIN g/dL 7 1* 10 0*   HEMATOCRIT % 25 1* 34 1*   PLATELETS Thousands/uL 308 430*   LYMPHO PCT %  --  21   MONO PCT MAN %  --  5   EOSINO PCT MANUAL %  --  1       Results from last 7 days  Lab Units 11/15/17  0546   SODIUM mmol/L 141   POTASSIUM mmol/L 3 5   CHLORIDE mmol/L 100   CO2 mmol/L 35*   BUN mg/dL 36*   CREATININE mg/dL 2 34*   CALCIUM mg/dL 8 5   TOTAL PROTEIN g/dL 6 6   BILIRUBIN TOTAL mg/dL 0 72   ALK PHOS U/L 115   ALT U/L 9*   AST U/L 24   GLUCOSE RANDOM mg/dL 93       Results from last 7 days  Lab Units 11/15/17  0547   INR  1 41*       * I Have Reviewed All Lab Data Listed Above  * Additional Pertinent Lab Tests Reviewed:  [facility] Admission Reviewed    Imaging:    Imaging Reports Reviewed Today Include: Chest Xray  Imaging Personally Reviewed by Myself Includes:  None    Recent Cultures (last 7 days):           Last 24 Hours Medication List:     carbidopa-levodopa 1 tablet Per PEG Tube TID   doxycycline hyclate 100 mg Per PEG Tube BID   insulin lispro 1-6 Units Subcutaneous Q6H [address]   ipratropium 2 puff Inhalation TID   lacosamide 100 mg Per PEG Tube BID   levothyroxine 37 5 mcg Per PEG Tube HS   metoprolol tartrate 25 mg Per PEG Tube BID   miconazole  Topical BID   midodrine 5 mg Per PEG Tube TID   sodium hypochlorite 1 application Irrigation TID        Today, Patient Was Seen By: Zuleima Hebert DO    ** Please Note: This note has been constructed using a voice recognition system   **

## 2017-11-15 NOTE — SOCIAL WORK
Pt is a resident of [facility], mechanical lift for transfers, HD at [facility] T,Th  Sat  Pt's current plan is to return to [facility]  CM reviewed d/c planning process including the following: identifying help at home, patient preference for d/c planning needs, Discharge Lounge, [facility] Meds to Bed program, availability of treatment team to discuss questions or concerns patient and/or family may have regarding understanding medications and recognizing signs and symptoms once discharged  CM also encouraged patient to follow up with all recommended appointments after discharge  Patient advised of importance for patient and family to participate in managing patients medical well being

## 2017-11-15 NOTE — ED NOTES
Wound on the upper right arm from attempted fistula access at another facility  Physician and NP dressed wound with surgifoam, 4x4 gauze, koban and ace bandage to control bleeding        Dave Hernandez RN  11/14/17 5337

## 2017-11-15 NOTE — CONSULTS
Consultation - Infectious Disease   James Fish 68 y o  female MRN: [id number]  Unit/Bed#: [facility] 816-01 Encounter: [id number]      IMPRESSION & RECOMMENDATIONS:   Impression/Recommendations:  1  SIRS  POA:  Leukocytosis and tachycardia  May be due to acute bleeding episode  No blood cultures obtained on admission  No clinical evidence for infection or sepsis  Rec:   · Discontinue antibiotics and follow closely  · Will need to address why patient is on doxycycline  · Follow temperatures and white blood cell count closely  · Supportive care as per the primary service    2  Asymptomatic pyuria  Unclear if the patient has active UTI she presented for alternative issues  Remains afebrile  Rec:   · Discontinue antibiotics as above and follow closely  · It appears a Barker catheter was placed in the emergency department  Will need to clarify its necessity    3  Chronic stage IV sacral decubitus ulcer  No evidence for acute purulence or cellulitis  Rec:   · Continue LWC and offloading as possible  · Await surgical evaluation    4  Ambulatory dysfunction with multiple admits comorbidities    5  ESRD on HD    6  Disposition  May need to re-address goals of care    Antibiotics:  Levofloxacin # 1  (Doxycycline)    Thank you for this consultation  We will follow along with you  HISTORY OF PRESENT ILLNESS:  Reason for Consult:  Pressure ulcer    HPI: James Fish is a 68 y o  female who is essentially nonverbal which appears to be her baseline and unable to provide any history so this is obtained exclusively through the medical record  The patient has end-stage renal disease on hemodialysis and was brought to the emergency department last night for bleeding from her AV fistula  Upon presentation she was noted to be afebrile but had leukocytosis and tachycardia  She had a Barker catheter placed in the ED which yielded cloudy brown urine and her urinalysis was positive    She is also noted to have a large sacral decubitus ulcer  She was given a dose of levofloxacin  We are asked to comment on further evaluation and management  Of note the patient is on doxycycline on her medication reconciliation for unclear reasons  REVIEW OF SYSTEMS:  Unable to obtain as the patient is nonverbal  A complete system-based review of systems is otherwise negative  PAST MEDICAL HISTORY:  Past Medical History:   Diagnosis Date    Anemia     [facility]     Diabetes mellitus ([facility] )     Disease of thyroid gland     Hyperlipidemia     Hypertension     Lupus     Pacemaker     Parkinson's disease ([facility] )     Pneumonia     Psychiatric disorder     Renal disorder     dialysis    UTI (urinary tract infection)     Vascular dialysis catheter in place [facility])      Past Surgical History:   Procedure Laterality Date    AMPUTATION      CARDIAC PACEMAKER PLACEMENT         FAMILY HISTORY:  Non-contributory    SOCIAL HISTORY:  History   Alcohol Use No     History   Drug Use No     History   Smoking Status    Never Smoker   Smokeless Tobacco    Never Used       ALLERGIES:  Allergies   Allergen Reactions    Aztreonam     Doxycycline     Erythromycin     Erythromycin Base     Indomethacin     Iodinated Diagnostic Agents     Latex     Niacin     Other      Sodium, Betadine, Adhesive, Niacin, Indocin, SOUP    Penicillins     Povidone      Other reaction(s): Other (See Comments)  Iodine, Betadine    Prednisone     Sulfa Antibiotics     Tetracyclines & Related     Vancomycin        MEDICATIONS:  All current active medications have been reviewed      PHYSICAL EXAM:  Vitals:  HR:  [] 71  Resp:  [11-36] 15  BP: ()/(39-91) 111/58  SpO2:  [71 %-100 %] 100 %  Temp (24hrs), Av 1 °F (37 3 °C), Min:99 °F (37 2 °C), Max:99 5 °F (37 5 °C)  Current: Temperature: 99 4 °F (37 4 °C)     Physical Exam:  General:  Chronically ill-appearing female, awake, in no acute distress  Eyes:  Conjunctive clear with no hemorrhages or effusions  Oropharynx:  No ulcers, no lesions  Neck:  Supple, no lymphadenopathy  Lungs:  Clear to auscultation bilaterally, no accessory muscle use  Cardiac:  Regular rate and rhythm, harsh mid systolic murmur heard throughout the precordium  Abdomen:  Soft, non-tender, non-distended  Extremities:  No peripheral cyanosis, clubbing, or edema  Skin:  No rashes  Neurological:  Aphasic  Tracks with her eyes  Bilateral upper extremity contractures  Limited mobility of lower extremities  Sacrum:  Surgery picture from this morning reviewed  Large stage IV ulceration with a combination of granular and fibrinous base  There is some mild necrotic tissue at the edges  No visible purulence or surrounding cellulitis  LABS, IMAGING, & OTHER STUDIES:  Lab Results:  I have personally reviewed pertinent labs  Results from last 7 days  Lab Units 11/15/17  0546 11/14/17  1727   SODIUM mmol/L 141 144   POTASSIUM mmol/L 3 5 3 5   CHLORIDE mmol/L 100 98*   CO2 mmol/L 35* 36*   ANION GAP mmol/L 6 10   BUN mg/dL 36* 24   CREATININE mg/dL 2 34* 1 75*   EGFR ml/min/1 73sq m 19 28   GLUCOSE RANDOM mg/dL 93 175*   CALCIUM mg/dL 8 5 8 8   AST U/L 24 19   ALT U/L 9* 9*   ALK PHOS U/L 115 147*   TOTAL PROTEIN g/dL 6 6 7 4   ALBUMIN g/dL 2 7* 2 8*   BILIRUBIN TOTAL mg/dL 0 72 0 50       Results from last 7 days  Lab Units 11/15/17  0546 11/14/17  1727   WBC Thousand/uL 17 20* 23 52*   HEMOGLOBIN g/dL 7 1* 10 0*   PLATELETS Thousands/uL 308 430*           Imaging Studies:   I have personally reviewed pertinent imaging study reports and images in PACS  Chest x-ray 11/15 no pneumonia    EKG, Pathology, and Other Studies:   I have personally reviewed pertinent reports

## 2017-11-15 NOTE — CASE MANAGEMENT
Initial Clinical Review    Admission: Date/Time/Statement: 11/15/17 @ 0052     Orders Placed This Encounter   Procedures    Inpatient Admission     Standing Status:   Standing     Number of Occurrences:   1     Order Specific Question:   Admitting Physician     Answer:   Darryl Gaspar [[id number]]     Order Specific Question:   Level of Care     Answer:   Med Surg [[id number]]     Order Specific Question:   Estimated length of stay     Answer:   More than 2 Midnights     Order Specific Question:   Certification     Answer:   I certify that inpatient services are medically necessary for this patient for a duration of greater than two midnights  See H&P and MD Progress Notes for additional information about the patient's course of treatment  ED: Date/Time/Mode of Arrival:   ED Arrival Information     Patient not seen in ED - transfer from [facility] ED                        Chief Complaint: Elevated white count with bleeding AV fistula; chronic pressure ulcer sacral area with indwelling Barker catheter and possible UTI    History of Illness: 68 y o  female who has a past medical history significant for anemia, lupus, DVT, chronic kidney disease and end-stage renal disease, diabetes, hypertension, hypothyroidism, atrial fibrillation, peripheral arterial disease and parkinsonian disease  She is a poor historian and she presented in [address] this afternoon sent by the hemodialysis center due to incessant bleeding of the AV fistula site  Initially, the bleeding was uncontrollable however with pressure and administration of vitamin K 10 milligrams as well as 2 units of fresh frozen plasma, bleeding has already stopped  Noted is patient's INR from [address] was acceptable at 2 74  After other evaluation, she was found to have a white count of 23 although mildly febrile at 99 5    She has a chronic indwelling Barker catheter which yielded brown turbid urine 1 025 with nitrites positive and leukocytes large innumerable WBCs RBCs and bacteria  The patient also has chronic sacral ulceration of grade 4 and currently is 9 5 centimeters on the longest diameter measurement  It is also deep  There is also tunneling present  That said, the patient was then started on levofloxacin and the choice of antibiotic was such due to the patient's multiple drug allergies with most antibiotics included  The patient was therefore transferred to [facility]  ED Vital Signs:   ED Triage Vitals   Temperature Pulse Respirations Blood Pressure SpO2   11/15/17 0100 11/15/17 0100 11/15/17 0100 11/15/17 0100 11/15/17 0100   99 4 °F (37 4 °C) 89 15 (!) 96/39 100 %      Temp Source Heart Rate Source Patient Position - Orthostatic VS BP Location FiO2 (%)   11/15/17 0100 11/15/17 0608 11/15/17 0100 11/15/17 0100 --   Oral Monitor Lying Left arm       Pain Score       11/15/17 0100       7        Wt Readings from Last 1 Encounters:   11/15/17 65 2 kg (143 lb 12 8 oz)       Vital Signs (abnormal): low BP 96/39; temperature 99 4 - 99 5    Abnormal Labs/Diagnostic Test Results:   Troponin 0 06  Albumin 2 8  Alkaline phosphatase 147  Alt 9  Cl 98  CO2-36  Bun 24  Creatinine 1 75  Glucose 175  PTT 50  INR 2 74  Wbc 23 52, hgb 10, hct 34 1  Wound culture - 2+ growth of proteus species  UA - large leukocytes  + nitrites  >=300 protein  Large blood  Labs 11/15- wbc 17 20, hgb 7 1, hct 25  1    hgb a1c 6 5  CO2-35  Bun 36  Creatinine 2 34  Alt 9  Albumin 2 7  INR 1 41  ED Treatment:   Medication Administration - No Administrations Displayed (No Start Event Found)     None          Past Medical/Surgical History:    Active Ambulatory Problems     Diagnosis Date Noted    Lupus 05/21/2017    Hyperlipidemia 05/21/2017    Hypertension 05/21/2017    Anemia 05/21/2017    [facility]'s disease [facility] ) 05/22/2017    Pacemaker 05/22/2017    Severe sepsis ([facility] ) 05/22/2017    Type 2 diabetes mellitus with renal manifestations ([place] [address] ) 05/22/2017    ESRD (end stage renal disease) on dialysis ([place] [address] ) 05/22/2017    Acute respiratory failure with hypoxia ([place] [address] ) 05/22/2017    Pressure ulcer of left buttock, unstageable ([place] [address] ) 05/22/2017    Paroxysmal atrial fibrillation ([place] [address] ) 05/22/2017    Hemodialysis catheter infection ([place] [address] ) 05/24/2017    Dermatitis associated with moisture 05/22/2017    Chronic diastolic congestive heart failure ([place] [address] ) 05/30/2017    Acute metabolic encephalopathy 22/88/6343    Infectious endocarditis 06/01/2017     Resolved Ambulatory Problems     Diagnosis Date Noted    No Resolved Ambulatory Problems     Past Medical History:   Diagnosis Date    Anemia     CH[facility]congestive heart failure) ([facility])     Diabetes mellitus ([place] [address] )     Disease of thyroid gland     Hyperlipidemia     Hypertension     Lupus     Pacemaker     Parkinson's disease ([place] [address] )     Pneumonia     Psychiatric disorder     Renal disorder     UTI (urinary tract infection)     Vascular dialysis catheter in place [facility])        Admitting Diagnosis: Bleeding disorder ([place] [address] ) [D68 9]    Age/Sex: 68 y o  female  Assessment/Plan: Admission, step-down level 2   · Pressure ulcer of the left buttock with possibility as cause for elevated white count and/or UTI  Patient has multiple drug allergies  She is also already on Vibramycin  We will continue levofloxacin which has been started by emergency room physicians in [address] [place] [address] prior to transfer  Meanwhile MRSA nasal culture screening  Wound culture of the sacral wound has been sent in [facility]   (Not so sure how valuable that would be )  Infectious disease consult for antibiotic guidance as patient has multiple drug allergies including aztreonam, doxycycline and yet she is on doxycycline, erythromycin, penicillins, sulfa antibiotics, vancomycin and tetracyclines  Wound culture and urine culture already sent from emergency room of transfer    General surgery consult for pressure wound which is currently very deep and seems needing surgical evaluation  · End-stage renal disease  She is on dialysis nephrology consult  Plan for Additional Problem(s):   · Diabetes mellitus  She is on insulins however it seems patient should not be fed by mouth as she has a chronic PEG tube and she does not clearly follow commands  At that said, we need to find out what her diet is like and therefore will need to confer with [address] [place] [address] tomorrow  Will place patient on insulin sliding scale every 6 hours pending her dietary schedule and diet information        Admission Orders:  TELE  11/15/2017  0030 INPATIENT   Scheduled Meds:   carbidopa-levodopa 1 tablet Per PEG Tube TID   doxycycline hyclate 100 mg Per PEG Tube BID   insulin lispro 1-6 Units Subcutaneous Q6H [address]AYLAN   ipratropium 2 puff Inhalation TID   lacosamide 100 mg Per PEG Tube BID   levothyroxine 37 5 mcg Per PEG Tube HS   metoprolol tartrate 25 mg Per PEG Tube BID   miconazole  Topical BID   midodrine 5 mg Per PEG Tube TID   sodium hypochlorite 1 application Irrigation TID     Continuous Infusions:    PRN Meds: not used:     acetaminophen    bisacodyl    docusate sodium    morphine    ondansetron    OTHER ORDERS: wound care daily - sacrum - betadine soaked kerlix     Fingerstick glucose q 6h    scds  Continuous pulse oximetry  NPO  Consult vascular; ID; nephrology; surgery

## 2017-11-15 NOTE — CONSULTS
Consultation - Nephrology   George Schlatter 68 y o  female MRN: [id number]  Unit/Bed#: [facility] 816-01 Encounter: [id number]    ASSESSMENT and PLAN:  1  ESRD on HD TTS transitiioning to [facility]):  Last HD yesterday, plan for HD tomorrow and Friday  -continue nephrocaps  2  Access: Hero AVG with recent bleeding at outpatient HD unit  Vascular consulted  Discussed with SLIM  No overt signs of infection  F/u blood culture  3  Hypertension: on metoprolol 25mg po BID, on midodrine 5mg po TID for hypotension  4  Anemia of CKD: monitor H&H, Hgb at goal on admission, now down to 7 1, transfuse prn  Not on SAIRA outpatient  5  Mineral and bone disease: monitor phos/PTH outpatient, on phoslo 1 tab TID with meals  6  Sacral decubitus ulcer (POA) - surgery follows  7  DM2 - on insulin per primary team  8  SIRS - no clear source of infection, no erythema overlying AVG, ID on board, f/u blood culture    HISTORY OF PRESENT ILLNESS:  Requesting Physician: Amish Cardenas DO  Reason for Consult: ESRD on HD    George Schlatter is a 68y o  year old female who was admitted to [facility] after presenting with bleeding of AVG  A renal consultation is requested today for assistance in the management of ESRD  George Schlatter is a known ESRD patient who undergoes maintenance hemodialysis at [facility] on TTS, last HD yesterday  History obtained from records as patient nonverbal at baseline  She had bleeding from AVG yesterday after HD session  Sent into hospital for this  She is to transition to [facility] dialysis from TTS per outpatient records  She also has a concerning sacral wound for which surgery has been consulted      PAST MEDICAL HISTORY:  Past Medical History:   Diagnosis Date    Anemia     CHF (congestive heart failure) ([address] )     Diabetes mellitus ([address] )     Disease of thyroid gland     Hyperlipidemia     Hypertension     Lupus     Pacemaker     Parkinson's disease ([facility]     Pneumonia     Psychiatric disorder     Renal disorder     dialysis    UTI (urinary tract infection)     Vascular dialysis catheter in place [facility])        PAST SURGICAL HISTORY:  Past Surgical History:   Procedure Laterality Date    AMPUTATION      CARDIAC PACEMAKER PLACEMENT         ALLERGIES:  Allergies   Allergen Reactions    Aztreonam     Doxycycline     Erythromycin     Erythromycin Base     Indomethacin     Iodinated Diagnostic Agents     Latex     Niacin     Other      Sodium, Betadine, Adhesive, Niacin, Indocin, SOUP    Penicillins     Povidone      Other reaction(s):  Other (See Comments)  Iodine, Betadine    Prednisone     Sulfa Antibiotics     Tetracyclines & Related     Vancomycin        SOCIAL HISTORY:  History   Alcohol Use No     History   Drug Use No     History   Smoking Status    Never Smoker   Smokeless Tobacco    Never Used       FAMILY HISTORY:  Family History   Problem Relation Age of Onset    Heart disease Mother     Heart disease Father     Heart disease Brother        MEDICATIONS:    Current Facility-Administered Medications:     acetaminophen (TYLENOL) tablet 650 mg, 650 mg, Per PEG Tube, Q4H PRN, Osbaldo Renee MD    bisacodyl (DULCOLAX) rectal suppository 10 mg, 10 mg, Rectal, PRN, Osbaldo Renee MD    carbidopa-levodopa (SINEMET)  mg per tablet 1 tablet, 1 tablet, Per PEG Tube, TID, Osbaldo Renee MD, 1 tablet at 11/15/17 1019    docusate sodium (COLACE) capsule 100 mg, 100 mg, Oral, BID PRN, Osbaldo Renee MD    doxycycline hyclate (VIBRAMYCIN) capsule 100 mg, 100 mg, Per PEG Tube, BID, Osbaldo Renee MD, 100 mg at 11/15/17 1019    insulin lispro (HumaLOG) 100 units/mL subcutaneous injection 1-6 Units, 1-6 Units, Subcutaneous, Q6H [address] **AND** Fingerstick Glucose (POCT), , , Q6H, Piedad Montano PA-C    ipratropium (ATROVENT HFA) inhaler 2 puff, 2 puff, Inhalation, TID, Gage Valencia MD, 2 puff at 11/15/17 1019    lacosamide (VIMPAT) 10 mg/mL oral solution 100 mg, 100 mg, Per PEG Tube, BID, Patsy Vasquez MD, 100 mg at 11/15/17 1019    levothyroxine tablet 37 5 mcg, 37 5 mcg, Per PEG Tube, HS, Patsy Vasquez MD    metoprolol tartrate (LOPRESSOR) tablet 25 mg, 25 mg, Per PEG Tube, BID, Patsy Vasquez MD, 25 mg at 11/15/17 1019    miconazole 2 % cream, , Topical, BID, Patsy Vasquez MD    midodrine (PROAMATINE) tablet 5 mg, 5 mg, Per PEG Tube, TID, Patsy Vasquez MD, 5 mg at 11/15/17 1019    morphine oral concentrated solution 5 mg, 5 mg, Per PEG Tube, Q6H PRN, Patsy Vasquez MD    ondansetron [facility]) injection 4 mg, 4 mg, Intravenous, Q6H PRN, Patsy Vasquez MD    sodium hypochlorite (HYSEPT) 0 25 % topical solution 1 application, 1 application, Irrigation, TID, Pasty Vasquez MD    REVIEW OF SYSTEMS:  Unable list HPI or review of systems as patient nonverbal at baseline and confused      PHYSICAL EXAM:  Current Weight: Weight - Scale: 65 2 kg (143 lb 12 8 oz)  First Weight: Weight - Scale: 65 2 kg (143 lb 12 8 oz)  Vitals:    11/15/17 0700 11/15/17 0845 11/15/17 0850 11/15/17 1231   BP: 111/52      Pulse: 74      Resp: 17      Temp: 98 2 °F (36 8 °C)      TempSrc: Axillary      SpO2: 100% 90% 96% 100%   Weight:       Height:           Intake/Output Summary (Last 24 hours) at 11/15/17 1241  Last data filed at 11/15/17 0550   Gross per 24 hour   Intake              500 ml   Output               10 ml   Net              490 ml     General: NAD, chronically ill appearing  Skin: warm, dry  Eyes: anicteric  ENT: MMM  Neck: supple  Chest: clear anteriorly  CVS: +B0X2, +systolic murmur  Abdomen: soft, ND, NT  Extremities: no edema  Neuro: awake, alert but nonverbal  Psych: flat affect    Invasive Devices:   Urethral Catheter Latex (Active)   Amt returned on insertion(mL) 300 mL 11/14/2017 10:30 PM   Site Assessment Clean;Skin intact 11/15/2017  7:01 AM   Collection Container Standard drainage bag 11/15/2017  7:01 AM   Securement Method Securing device (Describe) 11/15/2017  7:01 AM   Output (mL) 300 mL 11/14/2017 10:30 PM     Lab Results:     Results from last 7 days  Lab Units 11/15/17  0546 11/14/17  1727   WBC Thousand/uL 17 20* 23 52*   HEMOGLOBIN g/dL 7 1* 10 0*   HEMATOCRIT % 25 1* 34 1*   PLATELETS Thousands/uL 308 430*   SODIUM mmol/L 141 144   POTASSIUM mmol/L 3 5 3 5   CHLORIDE mmol/L 100 98*   CO2 mmol/L 35* 36*   BUN mg/dL 36* 24   CREATININE mg/dL 2 34* 1 75*   CALCIUM mg/dL 8 5 8 8   MAGNESIUM mg/dL 2 4  --    PHOSPHORUS mg/dL 3 3  --    ALBUMIN g/dL 2 7* 2 8*   TOTAL PROTEIN g/dL 6 6 7 4   BILIRUBIN TOTAL mg/dL 0 72 0 50   ALK PHOS U/L 115 147*   ALT U/L 9* 9*   AST U/L 24 19   GLUCOSE RANDOM mg/dL 93 175*     Lab Results   Component Value Date    CALCIUM 8 5 11/15/2017    PHOS 3 3 11/15/2017

## 2017-11-15 NOTE — ED CARE HANDOFF
Emergency Department Sign Out Note        Sign out and transfer of care from Dr Tanika Dick See Separate Emergency Department note  The patient, Leo Griffin, was evaluated by the previous provider for sepsis, coagulopathy, bleeding hemodialysis graft from coagulopathy  Workup Completed:  CBC wbc 23 5  hgb 10/34  CMP creat 1 75 baseline 3 31   trop 0 06 baseline elevated secondary to CRF  PTT 50  PT/INR 29/2 74    Anuric with jara placed,           ED Course / Workup Pending (followup):   Received 2 u FFP, vit K IV, IVF  Pressure dressing to R arm graft, bleeding controlled now,  Awaiting transfer to [address] [place] [address] by [place] @ 2130  Dr Igor Lees receiving MD                          ED Course      Procedures  MDM  CritCare Time      Disposition  Final diagnoses:   Hemorrhage - acute right upper arm graft site   Sepsis ([address] )   Diarrhea   Sacral decubitus ulcer     Time reflects when diagnosis was documented in both MDM as applicable and the Disposition within this note     Time User Action Codes Description Comment    11/14/2017  7:26 PM Tauna Friendly Add [R58] Hemorrhage     11/14/2017  7:26 PM Merline Fareed [R58] Hemorrhage acute right upper arm graft site    11/14/2017  7:26 PM [facility] [A41 9] Sepsis ([address] )     11/14/2017  7:26 PM Tauna Friendly Add [R19 7] Diarrhea     11/14/2017  7:26 PM Tauna Friendly Add [L89 159] Sacral decubitus ulcer       ED Disposition     ED Disposition Condition Comment    Transfer to Another [facility] should be transferred out to *[facility]**       MD Documentation    Flowsheet Row Most Recent Value   Patient Condition  The patient has been stabilized such that within reasonable medical probability, no material deterioration of the patient condition or the condition of the unborn child(joe) is likely to result from the transfer   Reason for Transfer  Level of Care needed not available at this facility   Benefits of Transfer  Specialized equipment and/or services available at the receiving facility (Include comment)________________________ [nephrology and vascular surgery]   Risks of Transfer  Potential for delay in receiving treatment, Potential deterioration of medical condition, Loss of IV, Increased discomfort during transfer, Possible worsening of condition or death during transfer   Accepting Physician  [facility] Name, [place], [place]    (Name & Tel number)  Marco Hollis [phone]   Sending MD Cleo Perez Do   Provider Certification  General risk, such as traffic hazards, adverse weather conditions, rough terrain or turbulence, possible failure of equipment (including vehicle or aircraft), or consequences of actions of persons outside the control of the transport personnel      RN Documentation    Flowsheet Row Most [address] [facility] Name, [place], [place]    (Name & Tel number)  Marco Hollis [phone]      Follow-up Information    None       Patient's Medications   Discharge Prescriptions    No medications on file     No discharge procedures on file         ED Provider  Electronically Signed by

## 2017-11-15 NOTE — CONSULTS
Consultation - General Surgery   Amairani Motley 68 y o  female MRN: [id number]  Unit/Bed#: [facility] [place] [facility] 816-01 Encounter: [id number]    Assessment/Plan     Assessment:  68 y F w/ large sacral decubitus ulcer stage IV with exposed coccyx and sacrum present on admission  Although impressive, it is likely not the source of her sepsis as there is no pus and no gangrenous tissue  Would advise addressing urosepsis  Plan:  - bedside wound debridement  - nursing betadine soaked kerlex to sacral decubitus ulcer daily thereafter  - this patient does not need to go to the operating room at this time and can have tube feeds started via PEG    Nahid Vogt MD PGY-4  04:23 AM  11/15/2017      History of Present Illness     HPI:  Amairani Motley is a 68 y o  female who presents with sepsis  She has a complicated medical history including anemia, lupus, DVT, chronic kidney dz, diabetes, HTN, afib, PAD  She is non verbal  Was seen at [facility] with a bleeding AV graft site, but after FFP and Vit K her bleeding stopped  She has a chronic indwelling jara  She was found to have a stage IV sacral wound  We are asked to assess the wound for a source of sepsis       Inpatient consult to Acute Care Surgery  Date/Time: 11/15/2017 10:44 AM  Performed by: Vijaya Mo  Authorized by: Yuliana Samuel           Review of Systems   Unable to perform ROS: Mental status change       Historical Information   Past Medical History:   Diagnosis Date    Anemia     CHF (congestive [facility] failure) ([facility] )     Diabetes mellitus ([address] )     Disease of thyroid gland     Hyperlipidemia     Hypertension     Lupus     Pacemaker     Parkinson's disease ([address] )     Pneumonia     Psychiatric disorder     Renal disorder     dialysis    UTI (urinary tract infection)     Vascular dialysis catheter in place [facility])      Past Surgical History:   Procedure Laterality Date    AMPUTATION      CARDIAC PACEMAKER PLACEMENT       Social History   History Alcohol Use No     History   Drug Use No     History   Smoking Status    Never Smoker   Smokeless Tobacco    Never Used     Family History: non-contributory    Meds/Allergies   all current active meds have been reviewed  Allergies   Allergen Reactions    Aztreonam     Doxycycline     Erythromycin     Erythromycin Base     Indomethacin     Iodinated Diagnostic Agents     Latex     Niacin     Other      Sodium, Betadine, Adhesive, Niacin, Indocin, SOUP    Penicillins     Povidone      Other reaction(s): Other (See Comments)  Iodine, Betadine    Prednisone     Sulfa Antibiotics     Tetracyclines & Related     Vancomycin        Objective   First Vitals:   Blood Pressure: (!) 96/39 (Nurse aware) (11/15/17 0100)  Pulse: 89 (11/15/17 0100)  Temperature: 99 4 °F (37 4 °C) (11/15/17 0100)  Temp Source: Oral (11/15/17 0100)  Respirations: 15 (11/15/17 0100)  Height: 5' 2" (157 5 cm) (11/15/17 0100)  Weight - Scale: 65 2 kg (143 lb 12 8 oz) (11/15/17 0100)  SpO2: 100 % (11/15/17 0100)    Current Vitals:   Blood Pressure: (!) 96/39 (Nurse aware) (11/15/17 0100)  Pulse: 89 (11/15/17 0100)  Temperature: 99 4 °F (37 4 °C) (11/15/17 0100)  Temp Source: Oral (11/15/17 0100)  Respirations: 15 (11/15/17 0100)  Height: 5' 2" (157 5 cm) (11/15/17 0100)  Weight - Scale: 65 2 kg (143 lb 12 8 oz) (11/15/17 0100)  SpO2: 100 % (11/15/17 0213)    No intake or output data in the 24 hours ending 11/15/17 0423    Invasive Devices     Peripheral Intravenous Line            Peripheral IV 11/14/17 Left Hand less than 1 day          Drain            Gastrostomy/Enterostomy PEG-jejunostomy LUQ -- days    Urethral Catheter Latex less than 1 day                Physical Exam   Constitutional: She is oriented to person, place, and time  She appears well-developed  HENT:   Head: Normocephalic and atraumatic  Eyes: Pupils are equal, round, and reactive to light  Neck: Normal range of motion  No tracheal deviation present  Cardiovascular: Normal rate and regular rhythm  Pulmonary/Chest: Effort normal    Abdominal: Soft  There is no tenderness  PEG   Musculoskeletal: Normal range of motion  She exhibits no edema  Neurological: She is alert and oriented to person, place, and time  No cranial nerve deficit  Skin: Skin is warm  No rash noted  Right arm brachial AV-graft with palpable thrill, pinpoint access site c/d/i   Psychiatric: She has a normal mood and affect     nonverbal         8cm x 8cm stage IV decubitus ulcer over sacrum    Lab Results:   CBC:   Lab Results   Component Value Date    WBC 17 20 (H) 11/15/2017    HGB 7 1 (L) 11/15/2017    HCT 25 1 (L) 11/15/2017    MCV 94 11/15/2017     11/15/2017    MCH 26 5 (L) 11/15/2017    MCHC 28 3 (L) 11/15/2017    RDW 18 2 (H) 11/15/2017    MPV 8 8 (L) 11/15/2017   , CMP:   Lab Results   Component Value Date     11/15/2017    K 3 5 11/15/2017     11/15/2017    CO2 35 (H) 11/15/2017    ANIONGAP 6 11/15/2017    BUN 36 (H) 11/15/2017    CREATININE 2 34 (H) 11/15/2017    GLUCOSE 93 11/15/2017    CALCIUM 8 5 11/15/2017    AST 24 11/15/2017    ALT 9 (L) 11/15/2017    ALKPHOS 115 11/15/2017    PROT 6 6 11/15/2017    ALBUMIN 2 7 (L) 11/15/2017    BILITOT 0 72 11/15/2017    EGFR 19 11/15/2017   , Coagulation:   Lab Results   Component Value Date    INR 1 41 (H) 11/15/2017   , Urinalysis:   Lab Results   Component Value Date    COLORU Brown 11/14/2017    CLARITYU Turbid 11/14/2017    SPECGRAV 1 025 11/14/2017    PHUR 7 5 11/14/2017    LEUKOCYTESUR Large (A) 11/14/2017    NITRITE Positive (A) 11/14/2017    PROTEINUA >=300 (A) 11/14/2017    GLUCOSEU Negative 11/14/2017    KETONESU Negative 11/14/2017    BILIRUBINUR Interference- unable to analyze (A) 11/14/2017    BLOODU Large (A) 11/14/2017   , Amylase: No results found for: AMYLASE, Lipase: No results found for: LIPASE  Imaging: I have personally reviewed pertinent films in PACS  EKG, Pathology, and Other Studies: I have personally reviewed pertinent films in PACS    Counseling / Coordination of Care  Total floor / unit time spent today 30 minutes  Greater than 50% of total time was spent with the patient and / or family counseling and / or coordination of care  A description of the counseling / coordination of care: plan of care

## 2017-11-15 NOTE — EMTALA/ACUTE CARE TRANSFER
[address] [address]  Dept: [phone]      EMTALA TRANSFER CONSENT    NAME Virginie Joy                                         1940                              MRN [id number]    I have been informed of my rights regarding examination, treatment, and transfer   by Dr Nereida Castillo DO    Benefits: Specialized equipment and/or services available at the receiving facility (Include comment)________________________ (nephrology and vascular surgery)    Risks: Potential for delay in receiving treatment, Potential deterioration of medical condition, Loss of IV, Increased discomfort during transfer, Possible worsening of condition or death during transfer      Consent for Transfer:  I acknowledge that my medical condition has been evaluated and explained to me by the emergency department physician or other qualified medical person and/or my attending physician, who has recommended that I be transferred to the service of  Accepting Physician: Margoth Angry at [address] Name, [address] : [place], [place]  The above potential benefits of such transfer, the potential risks associated with such transfer, and the probable risks of not being transferred have been explained to me, and I fully understand them  The doctor has explained that, in my case, the benefits of transfer outweigh the risks  I agree to be transferred  I authorize the performance of emergency medical procedures and treatments upon me in both transit and upon arrival at the receiving facility  Additionally, I authorize the release of any and all medical records to the receiving facility and request they be transported with me, if possible  I understand that the safest mode of transportation during a medical emergency is an ambulance and that the Hospital advocates the use of this mode of transport   Risks of traveling to the receiving facility by car, including absence of medical control, life sustaining equipment, such as oxygen, and medical personnel has been explained to me and I fully understand them  (EDWAR CORRECT BOX BELOW)  [  ]  I consent to the stated transfer and to be transported by ambulance/helicopter  [  ]  I consent to the stated transfer, but refuse transportation by ambulance and accept full responsibility for my transportation by car  I understand the risks of non-ambulance transfers and I exonerate the Hospital and its staff from any deterioration in my condition that results from this refusal     X___________________________________________    DATE  17  TIME________  Signature of patient or legally responsible individual signing on patient behalf           RELATIONSHIP TO PATIENT_________________________          Provider Certification    NAME Betsy Reid                                        [facility] 1940                              MRN [id number]    A medical screening exam was performed on the above named patient  Based on the examination:    Condition Necessitating Transfer The primary encounter diagnosis was Hemorrhage  Diagnoses of Sepsis ([facility] ), Diarrhea, and Sacral decubitus ulcer were also pertinent to this visit      Patient Condition: The patient has been stabilized such that within reasonable medical probability, no material deterioration of the patient condition or the condition of the unborn child(joe) is likely to result from the transfer    Reason for Transfer: Level of Care needed not available at this facility    Transfer Requirements: [address] [place] [address]   · Space available and qualified personnel available for treatment as acknowledged by [place] [phone]  · Agreed to accept transfer and to provide appropriate medical treatment as acknowledged by       Varghese Snell  · Appropriate medical records of the examination and treatment of the patient are provided at the time of transfer   [address] _______  · Transfer will be performed by qualified personnel from    and appropriate transfer equipment as required, including the use of necessary and appropriate life support measures  Provider Certification: I have examined the patient and explained the following risks and benefits of being transferred/refusing transfer to the patient/family:  General risk, such as traffic hazards, adverse weather conditions, rough terrain or turbulence, possible failure of equipment (including vehicle or aircraft), or consequences of actions of persons outside the control of the transport personnel      Based on these reasonable risks and benefits to the patient and/or the unborn child(joe), and based upon the information available at the time of the patients examination, I certify that the medical benefits reasonably to be expected from the provision of appropriate medical treatments at another medical facility outweigh the increasing risks, if any, to the individuals medical condition, and in the case of labor to the unborn child, from effecting the transfer      X____________________________________________ DATE 11/14/17        TIME_______      ORIGINAL - SEND TO MEDICAL RECORDS   COPY - SEND WITH PATIENT DURING TRANSFER

## 2017-11-15 NOTE — PROCEDURES
Incision and drain  Date/Time: 11/15/2017 10:26 AM  Performed by: Oracio Castillo  Authorized by: Zoe Nielson     Patient location:  Bedside  Location:     Indications for incision and drainage: decubitus ulcer  Size:  8cm x 8cm x 4cm    Location: sacrum  Procedure details:     Complexity:  Simple    Scalpel size: scissors  Techniques: debrided fibrinous material from wound base  Packing material: kerlex  Post-procedure details:     Patient tolerance of procedure: Tolerated well, no immediate complications  Comments:      Wound is a 8cm x 8cm x 4cm sacral decubitus ulcer, stage IV present on admission  I performed an excisional debridement of the decubitus ulcer  Scissors were used to cut tissue  The tissue removed was non-viable and fibrinous  The wound had exposed bone, as well as fibrinous material  Minimal devitalized tissue was present and this was cut away  At the end, wound remained 8cm x 8cm x4cm  No pus  No gangrenous tissue  Depth of debridement was to bone as bone was already exposed  Packed with Kerlex  Nurses can remove later today and place betadine soaked kerlex into wound daily      Hunter Lindsey MD PGY-4  10:58 AM  11/15/17

## 2017-11-15 NOTE — H&P
History and Physical - [facility]    Patient Information: Gilson Hassan 68 y o  female MRN: [id number]  Unit/Bed#: [facility] 816-01 Encounter: [id number]  Admitting Physician: Parke Holter, MD  PCP: Ronit Sinha DO  Date of Admission:  11/15/17  [facility] Problem List:     Principal Problem:    Pressure ulcer of left buttock, unstageable ([facility] [address] [address] )  Active Problems:    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 (HCC)    Acute cystitis    Type 2 diabetes mellitus with renal manifestations ([facility] [address] )    ESRD (end stage renal disease) on dialysis ([facility] )      Plan for the Primary Problem(s):  · Admission, step-down level 2   · Pressure ulcer of the left buttock with possibility as cause for elevated white count and/or UTI  Patient has multiple drug allergies  She is also already on Vibramycin  We will continue levofloxacin which has been started by emergency room physicians in [address] [place] [address] prior to transfer  Meanwhile MRSA nasal culture screening  Wound culture of the sacral wound has been sent in [facility]   (Not so sure how valuable that would be )  Infectious disease consult for antibiotic guidance as patient has multiple drug allergies including aztreonam, doxycycline and yet she is on doxycycline, erythromycin, penicillins, sulfa antibiotics, vancomycin and tetracyclines  Wound culture and urine culture already sent from emergency room of transfer  General surgery consult for pressure wound which is currently very deep and seems needing surgical evaluation  · End-stage renal disease  She is on dialysis nephrology consult  Plan for Additional Problem(s):   · Diabetes mellitus  She is on insulins however it seems patient should not be fed by mouth as she has a chronic PEG tube and she does not clearly follow commands  At that said, we need to find out what her diet is like and therefore will need to confer with [address] [place] [address] tomorrow    Will place patient on insulin sliding scale every 6 hours pending her dietary schedule and diet information  VTE Prophylaxis: Warfarin (Coumadin)  also warfarin is currently contraindicated due to the very recent bout of AV fistula bleeding / sequential compression device   Code Status: Prior do not resuscitate level 3  POLST: There is no POLST form on file for this patient (pre-hospital)    Anticipated Length of Stay:  Patient will be admitted on an Inpatient basis with an anticipated length of stay of  greater than 2 midnights  Justification for Hospital Stay: Please see detailed plans noted above  Chief Complaint:     Elevated white count with bleeding AV fistula; chronic pressure ulcer sacral area with indwelling Barker catheter and possible UTI  of Present Illness:  Viri Segura is a 68 y o  female who has a past medical history significant for anemia, lupus, DVT, chronic kidney disease and end-stage renal disease, diabetes, hypertension, hypothyroidism, atrial fibrillation, peripheral arterial disease and parkinsonian disease  She is a poor historian and she presented in [address] this afternoon sent by the hemodialysis center due to incessant bleeding of the AV fistula site  Initially, the bleeding was uncontrollable however with pressure and administration of vitamin K 10 milligrams as well as 2 units of fresh frozen plasma, bleeding has already stopped  Noted is patient's INR from [address] was acceptable at 2 74  After other evaluation, she was found to have a white count of 23 although mildly febrile at 99 5  She has a chronic indwelling Barker catheter which yielded brown turbid urine 1 025 with nitrites positive and leukocytes large innumerable WBCs RBCs and bacteria  The patient also has chronic sacral ulceration of grade 4 and currently is 9 5 centimeters on the longest diameter measurement  It is also deep  There is also tunneling present    That said, the patient was then started on levofloxacin and the choice of antibiotic was such due to the patient's multiple drug allergies with most antibiotics included  The patient was therefore transferred to [facility]     Currently, patient seems comfortable however the patient is minimally verbal which seems to be the baseline  She can indicate yes or no by moving her head up & down and side to side  Sometime she would groan if she is in pain but otherwise the patient is essentially nonverbal   She appears comfortable and lying flat on bed without any apparent respiratory distress  Review of Systems:  Review of systems is essentially unavailable at this time due to the patient's limited functional status    Constitutional:  Denies fever or chills   Eyes:  Denies change in visual acuity   HENT:  Denies nasal congestion or sore throat   Respiratory:  Denies cough or shortness of breath   Cardiovascular:  Denies chest pain or edema   GI:  Denies abdominal pain, nausea, vomiting, bloody stools or diarrhea   :  Denies dysuria   Musculoskeletal:  Denies back pain or joint pain   Integument:  Denies rash   Neurologic:  Denies headache, focal weakness or sensory changes   Endocrine:  Denies polyuria or polydipsia   Lymphatic:  Denies swollen glands   Psychiatric:  Denies depression or anxiety     Past Medical and Surgical History:   Past Medical History:   Diagnosis Date    Anemia     CH[facility]congestive heart failure) [facility]     Diabetes mellitus ([facility] )     Disease of thyroid gland     Hyperlipidemia     Hypertension     Lupus     Pacemaker     Parkinson's disease ([facility] )     Pneumonia     Psychiatric disorder     Renal disorder     dialysis    UTI (urinary tract infection)     Vascular dialysis catheter in place [facility])      Past Surgical History:   Procedure Laterality Date    AMPUTATION      CARDIAC PACEMAKER PLACEMENT         Meds/Allergies:  Prescriptions Prior to Admission   Medication    acetaminophen (TYLENOL) 325 mg tablet    aspirin (ECOTRIN LOW STRENGTH) 81 mg EC tablet    B Complex-C-Folic Acid (LILIAM-BEE/C PO)    Bisacodyl (BISAC-EVAC RE)    carbidopa-levodopa (SINEMET)  mg per tablet    doxycycline hyclate (VIBRAMYCIN) 100 mg capsule    insulin glargine (LANTUS) 100 units/mL subcutaneous injection    ipratropium (ATROVENT HFA) 17 mcg/act inhaler    lacosamide (VIMPAT) 10 mg/mL    levothyroxine 75 mcg tablet    Lidocaine-Prilocaine, Bulk, 2 5-2 5 % CREA    metoprolol tartrate (LOPRESSOR) 25 mg tablet    miconazole 2 % cream    midodrine (PROAMATINE) 5 mg tablet    morphine 20 MG/5ML solution    nystatin (MYCOSTATIN) cream    sodium hypochlorite (HYSEPT) 0 25 %    Specialty Vitamins Products (MAGNESIUM, AMINO ACID CHELATE,) 133 MG tablet       Allergies: Allergies   Allergen Reactions    Aztreonam     Doxycycline     Erythromycin     Erythromycin Base     Indomethacin     Iodinated Diagnostic Agents     Latex     Niacin     Other      Sodium, Betadine, Adhesive, Niacin, Indocin, SOUP    Penicillins     Povidone      Other reaction(s):  Other (See Comments)  Iodine, Betadine    Prednisone     Sulfa Antibiotics     Tetracyclines & Related     Vancomycin      History:  Marital Status: Single   Occupation: not working  Patient Pre-hospital Living Situation: in facility  Patient Pre-hospital Level of Mobility: immobile  Patient Pre-hospital Diet Restrictions: diabetic (not mentioned in papers what she is being fed)  Substance Use History:   History   Alcohol Use No     History   Smoking Status    Never Smoker   Smokeless Tobacco    Never Used     History   Drug Use No       Family History:  Family History   Problem Relation Age of Onset    Heart disease Mother     Heart disease Father     Heart disease Brother        Physical Exam:     Vitals:    134 62 14 62    Constitutional:  Well developed, obese, no acute distress, non-toxic appearance   Eyes:  PERRL, conjunctiva normal HENT:  Atraumatic, external ears normal, nose normal, oropharynx barely wet, no pharyngeal exudates  Neck- normal range of motion, no tenderness, supple   Respiratory:  No respiratory distress, normal breath sounds, no rales, no wheezing   Cardiovascular:  Normal rate, normal rhythm, no murmurs, no gallops, no rubs   GI:  Soft, nondistended, normal bowel sounds, nontender, no organomegaly, no mass, no rebound, no guarding - PEG tube at left abdomen  :  No costovertebral angle tenderness   Musculoskeletal:  No edema, no tenderness, spastic bilateral upper extermities  Integument:  dry, no rash, deep 9 5 cm longest diameter sacral ulceration, grade 4  Lymphatic:  No lymphadenopathy noted   Neurologic:  Alert &awake, non communicative, spastic upper extremities and extended lower extremities  Psychiatric:  Speech and behavior very minimal, regards examiner but non verbal      Lab Results: I have personally reviewed pertinent reports  Results from last 7 days  Lab Units 11/14/17  1727   WBC Thousand/uL 23 52*   HEMOGLOBIN g/dL 10 0*   HEMATOCRIT % 34 1*   PLATELETS Thousands/uL 430*   LYMPHO PCT % 21   MONO PCT MAN % 5   EOSINO PCT MANUAL % 1       Results from last 7 days  Lab Units 11/14/17  1727   SODIUM mmol/L 144   POTASSIUM mmol/L 3 5   CHLORIDE mmol/L 98*   CO2 mmol/L 36*   BUN mg/dL 24   CREATININE mg/dL 1 75*   CALCIUM mg/dL 8 8   TOTAL PROTEIN g/dL 7 4   BILIRUBIN TOTAL mg/dL 0 50   ALK PHOS U/L 147*   ALT U/L 9*   AST U/L 19   GLUCOSE RANDOM mg/dL 175*       Results from last 7 days  Lab Units 11/14/17  1727   INR  2 74*       EKG: sinus tachycardia to flutter  Irregular heart rate    Imaging: personally reviewed CXR - clear    No results found  ** Please Note: Dragon 360 Dictation voice to text software was used in the creation of this document   **

## 2017-11-15 NOTE — CONSULTS
Consultation - Vascular Surgery   Gilson Hassan 68 y o  female MRN: [id number]  Unit/Bed#: [facility] 816-01 Encounter: [id number]      Assessment/Plan      Assessment:  1  A 68year old female with ESRD on HD MWF (transitioned from Tu/Th/Sat) with access from [address] [place] [address] which was bleeding on outpatient basis - per nephrology   2  DM, type 2: A1c = 6 5%  3  Sacral Ulceration - per gen sx     Plan:  - patient to go to HD tomorrow, f/u with access and see if bleeding reoccurs  - await any imaging or vascular intervention at this time and reassess tomorrow    > medical management per primary     Thank you for consultation  Will discuss plan with attending  History of Present Illness   Physician Requesting Consult: Edwin Brooks DO  Reason for Consult / Principal Problem: RUE HERO AVG bleeding  History, ROS and PFSH unobtainable from any source due to non-verbal/non-ambulatory status  HPI: Gilson Hassan is a 68y o  year old female who presents with pressure ulceration to buttock region  She was transferred from [facility] ED for management  She also has bleeding from her HERO AVG on the right  She has been in dialysis for a year and half and got the graft placed years ago  The bleeding is a result from too much heparin given in dialysis, this is the second time that it has happened  Due to patient's non-verbal status, history obtained from   Inpatient consult to Vascular Surgery  Date/Time: 11/15/2017 1:36 PM  Performed by: Carissa Mayo  Authorized by: Bethany Contreras   Consent: Verbal consent obtained            Review of Systems   Unable to perform ROS: Patient nonverbal       Historical Information   Past Medical History:   Diagnosis Date    Anemia     [facility]     Diabetes mellitus [facility]     Disease of thyroid gland     Hyperlipidemia     Hypertension     Lupus     Pacemaker     Parkinson's disease ([facility] )     Pneumonia     Psychiatric disorder     Renal disorder     dialysis    UTI (urinary tract infection)     Vascular dialysis catheter in place [facility])      Past Surgical History:   Procedure Laterality Date    AMPUTATION      CARDIAC PACEMAKER PLACEMENT       Social History   History   Alcohol Use No     History   Drug Use No     History   Smoking Status    Never Smoker   Smokeless Tobacco    Never Used     Family History:   Family History   Problem Relation Age of Onset    Heart disease Mother     Heart disease Father     Heart disease Brother        Meds/Allergies   all current active meds have been reviewed  Allergies   Allergen Reactions    Aztreonam     Doxycycline     Erythromycin     Erythromycin Base     Indomethacin     Iodinated Diagnostic Agents     Latex     Niacin     Other      Sodium, Betadine, Adhesive, Niacin, Indocin, SOUP    Penicillins     Povidone      Other reaction(s): Other (See Comments)  Iodine, Betadine    Prednisone     Sulfa Antibiotics     Tetracyclines & Related     Vancomycin        Objective   Vitals: Blood pressure 111/52, pulse 74, temperature 98 2 °F (36 8 °C), temperature source Axillary, resp  rate 17, height 5' 2" (1 575 m), weight 65 2 kg (143 lb 12 8 oz), SpO2 100 %  ,Body mass index is 26 3 kg/m²  Intake/Output Summary (Last 24 hours) at 11/15/17 1336  Last data filed at 11/15/17 0550   Gross per 24 hour   Intake              500 ml   Output               10 ml   Net              490 ml     Invasive Devices     Peripheral Intravenous Line            Peripheral IV 11/14/17 Left Hand less than 1 day          Drain            Gastrostomy/Enterostomy PEG-jejunostomy LUQ -- days    Urethral Catheter Latex less than 1 day                Physical Exam   Constitutional: She has a sickly appearance  She appears ill  No distress  HENT:   Head: Atraumatic  Eyes: Pupils are equal, round, and reactive to light  Neck: Neck supple  Cardiovascular: Normal rate and regular rhythm      RUE: + thrill, + bruit, + mild palpable radial pulse   LUE: + radial pulse     LLE/RLE: + faint palpable femoral pulses; non-palpable B/L DP/PT pulses   Pulmonary/Chest: Effort normal and breath sounds normal    Abdominal: Soft  Bowel sounds are normal  She exhibits no distension  There is no tenderness  Musculoskeletal: She exhibits deformity (multiple amputations to L foot)  Neurological:   Patient is non-verbal, but responsive to stimuli    Skin:   RUE: no open lesions, no hematoma, mild ecchymotic patches noted, no erythema, no acute signs of infection noted   Eschar formation on finger     LLE:   Eschar on dorsal 4th digit        Lab Results:   I have personally reviewed pertinent reports  CBC with diff:   Lab Results   Component Value Date    WBC 17 20 (H) 11/15/2017    HGB 7 1 (L) 11/15/2017    HCT 25 1 (L) 11/15/2017    MCV 94 11/15/2017     11/15/2017    MCH 26 5 (L) 11/15/2017    MCHC 28 3 (L) 11/15/2017    RDW 18 2 (H) 11/15/2017    MPV 8 8 (L) 11/15/2017   , BMP/CMP:   Lab Results   Component Value Date     11/15/2017    K 3 5 11/15/2017     11/15/2017    CO2 35 (H) 11/15/2017    ANIONGAP 6 11/15/2017    BUN 36 (H) 11/15/2017    CREATININE 2 34 (H) 11/15/2017    GLUCOSE 93 11/15/2017    CALCIUM 8 5 11/15/2017    AST 24 11/15/2017    ALT 9 (L) 11/15/2017    ALKPHOS 115 11/15/2017    PROT 6 6 11/15/2017    ALBUMIN 2 7 (L) 11/15/2017    BILITOT 0 72 11/15/2017    EGFR 19 11/15/2017   , Coags:   Lab Results   Component Value Date    PTT 50 (H) 11/14/2017    INR 1 41 (H) 11/15/2017     Imaging Studies: I have personally reviewed pertinent reports  EKG, Pathology, and Other Studies: I have personally reviewed pertinent reports  VTE Prophylaxis: Sequential compression device Jessica Estrada      Code Status: Level 3 - DNAR and DNI  Advance Directive and Living Will:      Power of :    POLST:      Counseling / Coordination of Care  Counseling/Coordination of Care:  Total floor / unit time spent today 90 minutes  Greater than 50% of total time was spent with the patient and / or family counseling and / or coordination of care   A description of the counseling / coordination of care: patient and

## 2017-11-15 NOTE — PLAN OF CARE
Problem: DISCHARGE PLANNING - CARE MANAGEMENT  Goal: Discharge to post-acute care or home with appropriate resources  INTERVENTIONS:  - Conduct assessment to determine patient/family and health care team treatment goals, and need for post-acute services based on payer coverage, community resources, and patient preferences, and barriers to discharge  - Address psychosocial, clinical, and financial barriers to discharge as identified in assessment in conjunction with the patient/family and health care team  - Arrange appropriate level of post-acute services according to patient's   needs and preference and payer coverage in collaboration with the physician and health care team  - Communicate with and update the patient/family, physician, and health care team regarding progress on the discharge plan  - Arrange appropriate transportation to post-acute venues  Outcome: Progressing      Comments: - CM will assist Pt with her return to [facility] upon d/c   - CM will assist Pt with her resumption of HD at [facility] upon d/c

## 2017-11-15 NOTE — RESPIRATORY THERAPY NOTE
RT Protocol Note  Aron  68 y o  female MRN: [id number]  Unit/Bed#: [facility] 816-01 Encounter: [id number]    Assessment    Principal Problem:    Pressure ulcer of left buttock, unstageable ([facility])  Active Problems:    Type 2 diabetes mellitus with renal manifestations ([facility])    ESRD (end stage renal disease) on dialysis ([place] [address] )    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 ([facility])    Acute cystitis      Home Pulmonary Medications:  Atrovent inhaler    Past Medical History:   Diagnosis Date    Anemia     CHF (congestive heart failure) ([place] [address] )     Diabetes mellitus ([place] [address] )     Disease of thyroid gland     Hyperlipidemia     Hypertension     Lupus     Pacemaker     Parkinson's disease ([place] [address] )     Pneumonia     Psychiatric disorder     Renal disorder     dialysis    UTI (urinary tract infection)     Vascular dialysis catheter in place [facility])      Social History     Social History    Marital status: Single     Spouse name: N/A    Number of children: N/A    Years of education: N/A     Social History Main Topics    Smoking status: Never Smoker    Smokeless tobacco: Never Used    Alcohol use No    Drug use: No    Sexual activity: Not on file     Other Topics Concern    Not on file     Social History Narrative    No narrative on file       Subjective         Objective    Physical Exam:   Assessment Type: Assess only  General Appearance: Alert, Awake  Respiratory Pattern: Normal  Chest Assessment: Chest expansion symmetrical  Bilateral Breath Sounds: Diminished, Clear  R Breath Sounds: Diminished, Clear  L Breath Sounds: Diminished, Clear    Vitals:  SpO2 100 %  Imaging and other studies: I have personally reviewed pertinent reports              Plan    Respiratory Plan: Home Bronchodilator Patient pathway        Resp Comments: pt was ordered on atrovent TID so resp protocol was done, pt has no hx and takes no  scheduled txs per does have atrovent inhaler per charts pt is not in any distress and lungs are diminished but clear, pt is sating 100% on 2L, will changes txs to PRN

## 2017-11-16 ENCOUNTER — APPOINTMENT (INPATIENT)
Dept: DIALYSIS | Facility: HOSPITAL | Age: 77
DRG: 853 | End: 2017-11-16
Payer: MEDICARE

## 2017-11-16 LAB
ALBUMIN SERPL BCP-MCNC: 2.5 G/DL (ref 3.5–5)
ALP SERPL-CCNC: 117 U/L (ref 46–116)
ALT SERPL W P-5'-P-CCNC: 9 U/L (ref 12–78)
ANION GAP SERPL CALCULATED.3IONS-SCNC: 9 MMOL/L (ref 4–13)
AST SERPL W P-5'-P-CCNC: 46 U/L (ref 5–45)
BACTERIA UR CULT: NORMAL
BILIRUB SERPL-MCNC: 0.77 MG/DL (ref 0.2–1)
BUN SERPL-MCNC: 54 MG/DL (ref 5–25)
CALCIUM SERPL-MCNC: 8.6 MG/DL (ref 8.3–10.1)
CHLORIDE SERPL-SCNC: 100 MMOL/L (ref 100–108)
CO2 SERPL-SCNC: 31 MMOL/L (ref 21–32)
CREAT SERPL-MCNC: 3.49 MG/DL (ref 0.6–1.3)
GFR SERPL CREATININE-BSD FRML MDRD: 12 ML/MIN/1.73SQ M
GLUCOSE SERPL-MCNC: 128 MG/DL (ref 65–140)
GLUCOSE SERPL-MCNC: 149 MG/DL (ref 65–140)
GLUCOSE SERPL-MCNC: 187 MG/DL (ref 65–140)
GLUCOSE SERPL-MCNC: 188 MG/DL (ref 65–140)
GLUCOSE SERPL-MCNC: 292 MG/DL (ref 65–140)
GLUCOSE SERPL-MCNC: 301 MG/DL (ref 65–140)
MRSA NOSE QL CULT: NORMAL
POTASSIUM SERPL-SCNC: 5.1 MMOL/L (ref 3.5–5.3)
PROT SERPL-MCNC: 6.5 G/DL (ref 6.4–8.2)
SODIUM SERPL-SCNC: 140 MMOL/L (ref 136–145)
VIT B12 SERPL-MCNC: 677 PG/ML (ref 100–900)

## 2017-11-16 PROCEDURE — 82607 VITAMIN B-12: CPT | Performed by: INTERNAL MEDICINE

## 2017-11-16 PROCEDURE — 82948 REAGENT STRIP/BLOOD GLUCOSE: CPT

## 2017-11-16 PROCEDURE — 80053 COMPREHEN METABOLIC PANEL: CPT | Performed by: INTERNAL MEDICINE

## 2017-11-16 PROCEDURE — 5A1D70Z PERFORMANCE OF URINARY FILTRATION, INTERMITTENT, LESS THAN 6 HOURS PER DAY: ICD-10-PCS | Performed by: INTERNAL MEDICINE

## 2017-11-16 PROCEDURE — 94762 N-INVAS EAR/PLS OXIMTRY CONT: CPT

## 2017-11-16 PROCEDURE — 87040 BLOOD CULTURE FOR BACTERIA: CPT | Performed by: INTERNAL MEDICINE

## 2017-11-16 RX ORDER — METHYLPREDNISOLONE 16 MG/1
32 TABLET ORAL ONCE
Status: COMPLETED | OUTPATIENT
Start: 2017-11-17 | End: 2017-11-17

## 2017-11-16 RX ORDER — DIPHENHYDRAMINE HYDROCHLORIDE 50 MG/ML
50 INJECTION INTRAMUSCULAR; INTRAVENOUS ONCE
Status: COMPLETED | OUTPATIENT
Start: 2017-11-17 | End: 2017-11-17

## 2017-11-16 RX ORDER — DIPHENHYDRAMINE HYDROCHLORIDE 50 MG/ML
50 INJECTION INTRAMUSCULAR; INTRAVENOUS ONCE
Status: DISCONTINUED | OUTPATIENT
Start: 2017-11-16 | End: 2017-11-16

## 2017-11-16 RX ORDER — METHYLPREDNISOLONE 16 MG/1
32 TABLET ORAL ONCE
Status: COMPLETED | OUTPATIENT
Start: 2017-11-16 | End: 2017-11-16

## 2017-11-16 RX ADMIN — MIDODRINE HYDROCHLORIDE 5 MG: 5 TABLET ORAL at 10:16

## 2017-11-16 RX ADMIN — METHYLPREDNISOLONE 32 MG: 16 TABLET ORAL at 21:20

## 2017-11-16 RX ADMIN — CARBIDOPA AND LEVODOPA 1 TABLET: 25; 100 TABLET ORAL at 18:12

## 2017-11-16 RX ADMIN — GUAIFENESIN 200 MG: 100 SOLUTION ORAL at 02:23

## 2017-11-16 RX ADMIN — HYOSCYAMINE SULFATE 1 APPLICATION: 16 SOLUTION at 17:48

## 2017-11-16 RX ADMIN — LEVOTHYROXINE SODIUM 37.5 MCG: 75 TABLET ORAL at 21:21

## 2017-11-16 RX ADMIN — CARBIDOPA AND LEVODOPA 1 TABLET: 25; 100 TABLET ORAL at 14:54

## 2017-11-16 RX ADMIN — DOXYCYCLINE 100 MG: 100 CAPSULE ORAL at 18:11

## 2017-11-16 RX ADMIN — CARBIDOPA AND LEVODOPA 1 TABLET: 25; 100 TABLET ORAL at 21:22

## 2017-11-16 RX ADMIN — MICONAZOLE NITRATE: 20 CREAM TOPICAL at 17:48

## 2017-11-16 RX ADMIN — DOXYCYCLINE 100 MG: 100 CAPSULE ORAL at 14:54

## 2017-11-16 RX ADMIN — CALCIUM ACETATE 667 MG: 667 CAPSULE ORAL at 17:46

## 2017-11-16 RX ADMIN — CEFEPIME 2000 MG: 2 INJECTION, POWDER, FOR SOLUTION INTRAMUSCULAR; INTRAVENOUS at 13:21

## 2017-11-16 RX ADMIN — GUAIFENESIN 200 MG: 100 SOLUTION ORAL at 21:21

## 2017-11-16 RX ADMIN — INSULIN LISPRO 4 UNITS: 100 INJECTION, SOLUTION INTRAVENOUS; SUBCUTANEOUS at 18:18

## 2017-11-16 RX ADMIN — MIDODRINE HYDROCHLORIDE 5 MG: 5 TABLET ORAL at 21:22

## 2017-11-16 RX ADMIN — Medication 100 MG: at 18:11

## 2017-11-16 RX ADMIN — MIDODRINE HYDROCHLORIDE 5 MG: 5 TABLET ORAL at 17:46

## 2017-11-16 RX ADMIN — INSULIN LISPRO 1 UNITS: 100 INJECTION, SOLUTION INTRAVENOUS; SUBCUTANEOUS at 06:21

## 2017-11-16 RX ADMIN — METOPROLOL TARTRATE 25 MG: 25 TABLET ORAL at 17:55

## 2017-11-16 NOTE — PROGRESS NOTES
Progress Note - Nephrology   Kandy Spurling 68 y o  female MRN: [id number]  [facility] Encounter: [id number]      Assessment / Plan:  1  ESRD on HD TTS transitiioning to [facility]):  seen by me on HD today, plan for HD again tomorrow for transition to [facility] outpatient  -continue nephrocaps  2  Access: Hero AVG with recent bleeding at outpatient HD unit  Vascular consulted  Discussed with SLIM  No overt signs of infection  F/u blood culture, no issue with cannulation today  3  Hypertension: on metoprolol 25mg po BID, on midodrine 5mg po TID for hypotension  4  Anemia of CKD: monitor H&H, Hgb at goal on admission, now down to 7 1, transfuse prn  Not on SAIRA outpatient  5  Mineral and bone disease: monitor phos/PTH outpatient, on phoslo 1 tab TID with meals  6  Sacral decubitus ulcer (POA) - surgery follows  7  DM2 - on insulin per primary team  8  SIRS - no clear source of infection, no erythema overlying AVG, ID on board, f/u blood culture, on cefepime for now  ? Need for ongoing jara  Agree with ID  Subjective:   Patient seen examined on dialysis approximately 10:30 a m     Blood pressure 94/39, blood flow 300 mL/minute, dialysis flow 600 mL/minute, UF goal 1 L  Patient tolerating dialysis well  She is nonverbal   She follows some commands  Objective:     Vitals: Blood pressure (!) 117/32, pulse 101, temperature (!) 96 9 °F (36 1 °C), temperature source Tympanic, resp  rate 19, height 5' 2" (1 575 m), weight 65 2 kg (143 lb 12 8 oz), SpO2 100 %  ,Body mass index is 26 3 kg/m²  Temp (24hrs), Av 5 °F (37 5 °C), Min:96 9 °F (36 1 °C), Max:101 3 °F (38 5 °C)      Weight (last 2 days)     Date/Time   Weight    11/15/17 0100  65 2 (143 8)                Intake/Output Summary (Last 24 hours) at 17 1252  Last data filed at 17 0938   Gross per 24 hour   Intake              620 ml   Output                0 ml   Net              620 ml     I/O last 24 hours:   In: 620 [I V :200; NG/GT:420]  Out: 0         Physical Exam:   Gen: NAD, chronically ill appearing  Neck: supple  CV: +M3B9, +systolic murmur  Pulm: clear anteriorly  Abdomen: soft, +PEG tube  : +jara  Ext: no edema  Neuro: awake, alert, nonverbal  Skin: warm, dry    Invasive Devices     Peripheral Intravenous Line            Peripheral IV 11/14/17 Left Hand 1 day          Line            Hemodialysis AV Fistula Upper arm -- days          Drain            Gastrostomy/Enterostomy PEG-jejunostomy LUQ -- days    Urethral Catheter Latex 1 day                Medications:    Scheduled Meds:  calcium acetate 667 mg Oral TID With Meals   carbidopa-levodopa 1 tablet Per PEG Tube TID   cefepime 2,000 mg Intravenous Once   doxycycline hyclate 100 mg Per PEG Tube BID   guaiFENesin 200 mg Per PEG Tube Q6H   insulin lispro 1-6 Units Subcutaneous Q6H JAYLAN   ipratropium 2 puff Inhalation TID   lacosamide 100 mg Per PEG Tube BID   levothyroxine 37 5 mcg Per PEG Tube HS   metoprolol tartrate 25 mg Per PEG Tube BID   miconazole  Topical BID   midodrine 5 mg Per PEG Tube TID   sodium hypochlorite 1 application Irrigation TID       PRN Meds:   acetaminophen    bisacodyl    docusate sodium    morphine    ondansetron    Continuous Infusions:         LAB RESULTS:        Results from last 7 days  Lab Units 11/16/17  0520 11/15/17  0546 11/14/17  1727   WBC Thousand/uL  --  17 20* 23 52*   HEMOGLOBIN g/dL  --  7 1* 10 0*   HEMATOCRIT %  --  25 1* 34 1*   PLATELETS Thousands/uL  --  308 430*   LYMPHO PCT %  --   --  21   MONO PCT MAN %  --   --  5   EOSINO PCT MANUAL %  --   --  1   SODIUM mmol/L 140 141 144   POTASSIUM mmol/L 5 1 3 5 3 5   CHLORIDE mmol/L 100 100 98*   CO2 mmol/L 31 35* 36*   BUN mg/dL 54* 36* 24   CREATININE mg/dL 3 49* 2 34* 1 75*   CALCIUM mg/dL 8 6 8 5 8 8   ALBUMIN g/dL 2 5* 2 7* 2 8*   TOTAL PROTEIN g/dL 6 5 6 6 7 4   BILIRUBIN TOTAL mg/dL 0 77 0 72 0 50   ALK PHOS U/L 117* 115 147*   ALT U/L 9* 9* 9*   AST U/L 46* 24 19   GLUCOSE RANDOM mg/dL 149* 93 175*   MAGNESIUM mg/dL  --  2 4  --    PHOSPHORUS mg/dL  --  3 3  --        CUTURES:  Lab Results   Component Value Date    BLOODCX No Growth After 5 Days  05/25/2017    BLOODCX No Growth After 5 Days  05/25/2017    BLOODCX Staphylococcus epidermidis 05/22/2017    BLOODCX Staphylococcus epidermidis 05/21/2017    BLOODCX Staphylococcus capitis 05/21/2017    URINECX No Growth <1000 cfu/mL 11/14/2017                 Portions of the record may have been created with voice recognition software  Occasional wrong word or "sound a like" substitutions may have occurred due to the inherent limitations of voice recognition software  Read the chart carefully and recognize, using context, where substitutions have occurred  If you have any questions, please contact the dictating provider

## 2017-11-16 NOTE — PROGRESS NOTES
Progress Note - General Surgery   Ovidio Perez 68 y o  female MRN: [id number]  Unit/Bed#: [facility] 816-01 Encounter: [id number]    Assessment:   68year old female with ESRD on HD MWF (transitioned from Tu/Th/Sat) with access from [address] [place] [address] which was bleeding on outpatient basis     Plan:  Plan for HD today with graft  F/u HD to see if any intervention of imaging required  Primary care per SLIM    Subjective/Objective   Chief Complaint:     Subjective: FOX  Palp thrill of graft  Patient opens eyes to stimulus  Objective:     Blood pressure (!) 87/50, pulse 84, temperature 100 3 °F (37 9 °C), temperature source Axillary, resp  rate 16, height 5' 2" (1 575 m), weight 65 2 kg (143 lb 12 8 oz), SpO2 94 %  ,Body mass index is 26 3 kg/m²  Intake/Output Summary (Last 24 hours) at 11/16/17 0352  Last data filed at 11/15/17 2200   Gross per 24 hour   Intake              500 ml   Output               10 ml   Net              490 ml       Invasive Devices     Peripheral Intravenous Line            Peripheral IV 11/14/17 Left Hand 1 day          Line            Hemodialysis AV Fistula Upper arm -- days          Drain            Gastrostomy/Enterostomy PEG-jejunostomy LUQ -- days    Urethral Catheter Latex 1 day                Physical Exam: NAD  Opens eyes to stimulus  RRR  normal respiratory effort  soft, NT, ND  Palp hero graft in RUE     Lab, Imaging and other studies:I have personally reviewed pertinent lab results      VTE Mechanical Prophylaxis: sequential compression device

## 2017-11-16 NOTE — NUTRITION
11/16/17 1629   Recommendations/Interventions   Nutrition Recommendations Continue EN/PN as ordered  (rec continuing Nepro @50ml/hr with free water flushes 60ml q 6 hours and 1 packet prosource/day   Recommend also adding All BID to help promote wound healing  monitor weight and electrolytes  )

## 2017-11-16 NOTE — PROGRESS NOTES
Progress Note - General Surgery   Radha Elder 68 y o  female MRN: [id number]  Unit/Bed#: [facility] 816-01 Encounter: [id number]    Assessment:  68 y F w/ large sacral decubitus ulcer stage IV with exposed coccyx and sacrum present on admission  Although impressive, it is likely not the source of her sepsis as there is no pus and no gangrenous tissue  Plan:  BID dressing changes to sacrum w/ betadine & kerlix    Subjective/Objective   Chief Complaint: none    Subjective: no complaints this AM  Non verbal  Does not appear to be in discomfort    Objective:     Blood pressure 116/51, pulse 78, temperature (!) 101 3 °F (38 5 °C), temperature source Oral, resp  rate 20, height 5' 2" (1 575 m), weight 65 2 kg (143 lb 12 8 oz), SpO2 100 %  ,Body mass index is 26 3 kg/m²  Intake/Output Summary (Last 24 hours) at 11/16/17 0856  Last data filed at 11/16/17 0600   Gross per 24 hour   Intake              420 ml   Output                0 ml   Net              420 ml       Invasive Devices     Peripheral Intravenous Line            Peripheral IV 11/14/17 Left Hand 1 day          Line            Hemodialysis AV Fistula Upper arm -- days          Drain            Gastrostomy/Enterostomy PEG-jejunostomy LUQ -- days    Urethral Catheter Latex 1 day              Physical Exam:   Gen: A&O, NAD  Cardio: RRR  Lungs: CTAB  Back: Ulcer to sacrum with clean edges, some granulation/fibrinous material at base   Dressed with betadine soaked kerlix    Lab, Imaging and other studies:  CBC: No results found for: WBC, HGB, HCT, MCV, PLT, ADJUSTEDWBC, MCH, MCHC, RDW, MPV, NRBC, CMP:   Lab Results   Component Value Date     11/16/2017    K 5 1 11/16/2017     11/16/2017    CO2 31 11/16/2017    ANIONGAP 9 11/16/2017    BUN 54 (H) 11/16/2017    CREATININE 3 49 (H) 11/16/2017    GLUCOSE 149 (H) 11/16/2017    CALCIUM 8 6 11/16/2017    AST 46 (H) 11/16/2017    ALT 9 (L) 11/16/2017    ALKPHOS 117 (H) 11/16/2017    PROT 6 5 11/16/2017 ALBUMIN 2 5 (L) 11/16/2017    BILITOT 0 77 11/16/2017    EGFR 12 11/16/2017     VTE Pharmacologic Prophylaxis: Heparin  VTE Mechanical Prophylaxis: sequential compression device

## 2017-11-16 NOTE — PROGRESS NOTES
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 ([facility] )    Acute cystitis      Plan:    · SIRS vs  Sepsis POA -   · ID has been monitoring off antibiotics  Spoke to Dr Melisa Ross, given elevated temperature today, we will look to empirically start some antibiotics  However, various potential sources present including ulcer infection, urinary infection, potential aspiration, other  · Recheck blood cultures today  ·  We will hope to get a CT chest abdomen pelvis with contrast if possible  However, I need to check with  regarding reaction to IV dye  If it is a mild reaction like rash, could do a prep and then get CT  If it is because of renal function, then no prep needed  If anaphylactic or severe reaction, may just consider dry scan   not available but will order once he calls back  ADDENDUM - Spoke to Radiology at [place] who is very familiar with patient  Her reaction is generalized hives but has tolerated contrast with prep at that location before  · Note: Per MAR and discussion with staff from [facility], patient is listed as being on doxycycline for diagnosis of "endocarditis"  · Chronic  Stage IV Sacral Decubitus Ulcer POA -   · Follow up Wound culture done in the ER in [facility]  · Procedure - Bedside Incision and Drainage / Wound Debridement by Dr Alfa Chavira / Dr Juaquin Burgess on 11/15/2017  · BID dressing changes with betadine and Kerlix  · Asymptomatic Pyuria -   · ID recommends following off antibiotics     · On 11/15 when I spoke with the  he stated that patient still has a stent placed for "kidney blockage" in August   Information passed on to ID team     · Urine culture is negative  · Bleeding from AV Graft Site - Vascular surgery has evaluated patient  They will see how she does with HD today  Will use this as a basis for whether additional testing needed to evaluate this  Patient states this is not the first time this has happened  · Anemia of Chronic Disease - the hemoglobin of 10 appears to be the outlayer as most hemoglobins are 7 0 - 8 0  H/H later today  CBC now and again in am   · ESRD on HD - Nephrology following  · Diabetes Mellitus Type 2 - Continue current regimen  Monitor blood glucose levels  · Acute Encephalopathy -   · Check NH3  · Negative testing - TSH, B12   · Monitor for any infections  · Medication Review - sinemet, vimpat, PRN morphine are all potential contributors  · Consider neurologist evaluation if symptoms persist   · Dysphagia - The patient is normally on tube feeding only (NPO)  On 11/15, restarted prehospital tube feeds of nepro at 50 ml/hr and FWF of 60 ml every 6 hours per my discussion with staff at [facility]  Aspiration precautions with HOB > 30 degrees at all times  · Level of Care - Currently Level 2 Stepdown  Maintain for now given fever  Monitor for any sepsis  · VTE Prophylaxis - start if no clear bleeding noted  VTE Pharmacologic Prophylaxis:   Pharmacologic: coagulopathy POA  Mechanical VTE Prophylaxis in Place: Yes    Patient Centered Rounds: I have performed bedside rounds with nursing staff today  Discussions with Specialists or Other Care Team Provider: Dr Jonas Yates  Education and Discussions with Family / Patient: Addendum - updated Carolann Simmons when he called back  Main contact is  Carolann Simmons at   Time Spent for Care: 45 minutes  More than 50% of total time spent on counseling and coordination of care as described above      Current Length of Stay: 1 day(s)    Current Patient Status: Inpatient   Certification Statement: The patient will continue to require additional inpatient hospital stay due to evaluations for the SIRS response, bleeding fistula, and pressure ulcer  Discharge Plan / Estimated Discharge Date: to be determined  Code Status: Level 3 - DNAR and DNI      Subjective: The patient is awake but mainly nonverbal when I attempt to ask her questions / converse with her  The patient does not follow commands  The patient has spiked temp to 101 3 this morning  The patient has had no diarrhea, vomiting, and has not been coughing significantly per nursing  Urine is a brownish color and very small amount  Objective:     Vitals:   Temp (24hrs), Av 1 °F (37 8 °C), Min:99 4 °F (37 4 °C), Max:101 3 °F (38 5 °C)    HR:  [62-86] 78  Resp:  [15-20] 20  BP: ()/(41-51) 116/51  SpO2:  [94 %-100 %] 100 %  Body mass index is 26 3 kg/m²  Input and Output Summary (last 24 hours): Intake/Output Summary (Last 24 hours) at 17 0910  Last data filed at 17 0600   Gross per 24 hour   Intake              420 ml   Output                0 ml   Net              420 ml       Physical Exam:     Physical Exam   Constitutional: No distress  HENT:   Somewhat dry oral mucosa  Eyes: Conjunctivae are normal  Pupils are equal, round, and reactive to light  Cardiovascular: Normal rate and regular rhythm  No murmur heard  Pulmonary/Chest: She has no wheezes  She has no rales  Decreased breath sounds   Abdominal: Soft  Bowel sounds are normal  She exhibits no distension  There is no tenderness  PEG site intact  No erythema or drainage  Genitourinary:   Genitourinary Comments: Barker catheter - small amount of brownish urine   Musculoskeletal: She exhibits no edema  Lymphadenopathy:     She has no cervical adenopathy  Neurological:   Patient stares when speaking to her  She has chronic upper extremity contractures    The patient does not follow commands, but just closes eyes again  Skin: No rash noted  Vitals reviewed  Additional Data:     Labs:      Results from last 7 days  Lab Units 11/15/17  0546 11/14/17  1727   WBC Thousand/uL 17 20* 23 52*   HEMOGLOBIN g/dL 7 1* 10 0*   HEMATOCRIT % 25 1* 34 1*   PLATELETS Thousands/uL 308 430*   LYMPHO PCT %  --  21   MONO PCT MAN %  --  5   EOSINO PCT MANUAL %  --  1       Results from last 7 days  Lab Units 11/16/17  0520   SODIUM mmol/L 140   POTASSIUM mmol/L 5 1   CHLORIDE mmol/L 100   CO2 mmol/L 31   BUN mg/dL 54*   CREATININE mg/dL 3 49*   CALCIUM mg/dL 8 6   TOTAL PROTEIN g/dL 6 5   BILIRUBIN TOTAL mg/dL 0 77   ALK PHOS U/L 117*   ALT U/L 9*   AST U/L 46*   GLUCOSE RANDOM mg/dL 149*       Results from last 7 days  Lab Units 11/15/17  0547   INR  1 41*       * I Have Reviewed All Lab Data Listed Above  * Additional Pertinent Lab Tests Reviewed:  [facility]    Imaging:    Imaging Reports Reviewed Today Include: Chest Xray  Imaging Personally Reviewed by Myself Includes:  None    Recent Cultures (last 7 days):       Results from last 7 days  Lab Units 11/14/17  2230 11/14/17  1856   GRAM STAIN RESULT   --  No Polys or Bacteria seen   URINE CULTURE  No Growth <1000 cfu/mL  --    WOUND CULTURE   --  2+ Growth of Proteus species*       Last 24 Hours Medication List:     calcium acetate 667 mg Oral TID With Meals   carbidopa-levodopa 1 tablet Per PEG Tube TID   doxycycline hyclate 100 mg Per PEG Tube BID   guaiFENesin 200 mg Per PEG Tube Q6H   insulin lispro 1-6 Units Subcutaneous Q6H JAYLAN   ipratropium 2 puff Inhalation TID   lacosamide 100 mg Per PEG Tube BID   levothyroxine 37 5 mcg Per PEG Tube HS   metoprolol tartrate 25 mg Per PEG Tube BID   miconazole  Topical BID   midodrine 5 mg Per PEG Tube TID   sodium hypochlorite 1 application Irrigation TID        Today, Patient Was Seen By: Letitia Parra DO    ** Please Note: This note has been constructed using a voice recognition system   **

## 2017-11-16 NOTE — PROGRESS NOTES
Progress Note - Infectious Disease   Jase Fischer 68 y o  female MRN: [id number]  Unit/Bed#: [facility] 816-01 Encounter: [id number]      Impression/Recommendations:  1  SIRS versus sepsis  POA:  Leukocytosis and tachycardia  New fever this AM concerning for evolving infection  Blood cultures initially negative  Urine culture negative but was on antibiotics on admission (Doxycycline)  Consider occult infection given recent complicated medical course   Hemodynamically stable and non-toxic  Rec:       · Start cefepime for now  · Recheck blood cultures  · Check CT C/A/P with IV/PO contrast   Will need steroid prep given dye allergy  · Follow temperatures and white blood cell count closely  · Supportive care as per the primary service     2  Pyuria  Unclear if the patient has active UTI she presented for alternative issues  Urine culture negative but was on antibiotics on admission  Has history of ?ureteral stent in August  Rec:       · Start cefepime as above  · It appears a Barker catheter was placed in the emergency department  Will need to clarify its necessity  ? For #3?     3   Chronic stage IV sacral decubitus ulcer  No evidence for acute purulence or cellulitis  Status post bedside debridement per surgery  Rec:       · Continue LWC and offloading as possible per surgery     4  Ambulatory dysfunction with multiple admits comorbidities     5  ESRD on HD  Via RUE AVG     6   Disposition  May need to re-address goals of care     Discussed in detail with Dr Ky Farrar  Antibiotics:  Off antibiotics # 1  (Doxycycline)    Subjective:  Patient seen on AM rounds  Patient non-verbal and unable to provide a ROS  Had a fever this AM   HR and BP stable  No documented nausea, vomiting, or diarrhea         Objective:  Vitals:  HR:  [62-86] 78  Resp:  [15-20] 20  BP: ()/(41-51) 116/51  SpO2:  [94 %-100 %] 100 %  Temp (24hrs), Av 1 °F (37 8 °C), Min:99 4 °F (37 4 °C), Max:101 3 °F (38 5 °C)  Current: Temperature: (!) 101 3 °F (38 5 °C) (RN notified)    Physical Exam:   General:  No acute distress  Eyes:  Normal lids and conjunctivae  ENT:  Normal external ears and nose  Neck:  Neck symmetric with midline trachea  Pulmonary:  Normal respiratory effort without accessory muscle use  Cardiovascular:  Regular rate and rhythm; no peripheral edema  Gastrointestinal:  No tenderness or distention, PEG intact  Musculoskeletal:  No digital clubbing or cyanosis  Skin:  No visible rashes; No palpable nodules  Neurologic:  Upper extremity contractures, no movement of bilateral lower extermities  Psychiatric:  Awake and alert but nonverbal    Lab Results:  I have personally reviewed pertinent labs  Results from last 7 days  Lab Units 11/16/17  0520 11/15/17  0546 11/14/17  1727   SODIUM mmol/L 140 141 144   POTASSIUM mmol/L 5 1 3 5 3 5   CHLORIDE mmol/L 100 100 98*   CO2 mmol/L 31 35* 36*   ANION GAP mmol/L 9 6 10   BUN mg/dL 54* 36* 24   CREATININE mg/dL 3 49* 2 34* 1 75*   EGFR ml/min/1 73sq m 12 19 28   GLUCOSE RANDOM mg/dL 149* 93 175*   CALCIUM mg/dL 8 6 8 5 8 8   AST U/L 46* 24 19   ALT U/L 9* 9* 9*   ALK PHOS U/L 117* 115 147*   TOTAL PROTEIN g/dL 6 5 6 6 7 4   ALBUMIN g/dL 2 5* 2 7* 2 8*   BILIRUBIN TOTAL mg/dL 0 77 0 72 0 50       Results from last 7 days  Lab Units 11/15/17  0546 11/14/17  1727   WBC Thousand/uL 17 20* 23 52*   HEMOGLOBIN g/dL 7 1* 10 0*   PLATELETS Thousands/uL 308 430*       Results from last 7 days  Lab Units 11/14/17  2230 11/14/17  1856   GRAM STAIN RESULT   --  No Polys or Bacteria seen   URINE CULTURE  No Growth <1000 cfu/mL  --    WOUND CULTURE   --  2+ Growth of Proteus species*  2+ Growth of Gram Negative Toni Enteric Like*       Imaging Studies:   I have personally reviewed pertinent imaging study reports and images in PACS  EKG, Pathology, and Other Studies:   I have personally reviewed pertinent reports

## 2017-11-17 ENCOUNTER — APPOINTMENT (INPATIENT)
Dept: DIALYSIS | Facility: HOSPITAL | Age: 77
DRG: 853 | End: 2017-11-17
Attending: INTERNAL MEDICINE
Payer: MEDICARE

## 2017-11-17 ENCOUNTER — APPOINTMENT (INPATIENT)
Dept: RADIOLOGY | Facility: HOSPITAL | Age: 77
DRG: 853 | End: 2017-11-17
Payer: MEDICARE

## 2017-11-17 LAB
ABO GROUP BLD: NORMAL
AMMONIA PLAS-SCNC: 19 UMOL/L (ref 11–35)
ANION GAP SERPL CALCULATED.3IONS-SCNC: 10 MMOL/L (ref 4–13)
ANTIBODY ID. #3: NORMAL
APTT PPP: 25 SECONDS (ref 23–35)
BASOPHILS # BLD AUTO: 0.01 THOUSANDS/ΜL (ref 0–0.1)
BASOPHILS # BLD AUTO: 0.01 THOUSANDS/ΜL (ref 0–0.1)
BASOPHILS NFR BLD AUTO: 0 % (ref 0–1)
BASOPHILS NFR BLD AUTO: 0 % (ref 0–1)
BLD GP AB SCN SERPL QL: POSITIVE
BLOOD GROUP ANTIBODIES SERPL: NORMAL
BLOOD GROUP ANTIBODIES SERPL: NORMAL
BUN SERPL-MCNC: 48 MG/DL (ref 5–25)
CALCIUM SERPL-MCNC: 8.6 MG/DL (ref 8.3–10.1)
CHLORIDE SERPL-SCNC: 93 MMOL/L (ref 100–108)
CO2 SERPL-SCNC: 29 MMOL/L (ref 21–32)
CREAT SERPL-MCNC: 2.49 MG/DL (ref 0.6–1.3)
EOSINOPHIL # BLD AUTO: 0 THOUSAND/ΜL (ref 0–0.61)
EOSINOPHIL # BLD AUTO: 0 THOUSAND/ΜL (ref 0–0.61)
EOSINOPHIL NFR BLD AUTO: 0 % (ref 0–6)
EOSINOPHIL NFR BLD AUTO: 0 % (ref 0–6)
ERYTHROCYTE [DISTWIDTH] IN BLOOD BY AUTOMATED COUNT: 17.6 % (ref 11.6–15.1)
ERYTHROCYTE [DISTWIDTH] IN BLOOD BY AUTOMATED COUNT: 17.7 % (ref 11.6–15.1)
GFR SERPL CREATININE-BSD FRML MDRD: 18 ML/MIN/1.73SQ M
GLUCOSE SERPL-MCNC: 180 MG/DL (ref 65–140)
GLUCOSE SERPL-MCNC: 257 MG/DL (ref 65–140)
GLUCOSE SERPL-MCNC: 270 MG/DL (ref 65–140)
GLUCOSE SERPL-MCNC: 335 MG/DL (ref 65–140)
GLUCOSE SERPL-MCNC: 358 MG/DL (ref 65–140)
GLUCOSE SERPL-MCNC: 420 MG/DL (ref 65–140)
GLUCOSE SERPL-MCNC: 451 MG/DL (ref 65–140)
HCT VFR BLD AUTO: 22 % (ref 34.8–46.1)
HCT VFR BLD AUTO: 24.8 % (ref 34.8–46.1)
HEMOCCULT STL QL: NEGATIVE
HGB BLD-MCNC: 6.6 G/DL (ref 11.5–15.4)
HGB BLD-MCNC: 7.4 G/DL (ref 11.5–15.4)
LYMPHOCYTES # BLD AUTO: 4.55 THOUSANDS/ΜL (ref 0.6–4.47)
LYMPHOCYTES # BLD AUTO: 5.1 THOUSANDS/ΜL (ref 0.6–4.47)
LYMPHOCYTES NFR BLD AUTO: 22 % (ref 14–44)
LYMPHOCYTES NFR BLD AUTO: 26 % (ref 14–44)
MCH RBC QN AUTO: 26.7 PG (ref 26.8–34.3)
MCH RBC QN AUTO: 26.8 PG (ref 26.8–34.3)
MCHC RBC AUTO-ENTMCNC: 29.8 G/DL (ref 31.4–37.4)
MCHC RBC AUTO-ENTMCNC: 30 G/DL (ref 31.4–37.4)
MCV RBC AUTO: 89 FL (ref 82–98)
MCV RBC AUTO: 90 FL (ref 82–98)
MONOCYTES # BLD AUTO: 0.18 THOUSAND/ΜL (ref 0.17–1.22)
MONOCYTES # BLD AUTO: 0.31 THOUSAND/ΜL (ref 0.17–1.22)
MONOCYTES NFR BLD AUTO: 1 % (ref 4–12)
MONOCYTES NFR BLD AUTO: 2 % (ref 4–12)
NEUTROPHILS # BLD AUTO: 13.8 THOUSANDS/ΜL (ref 1.85–7.62)
NEUTROPHILS # BLD AUTO: 15.64 THOUSANDS/ΜL (ref 1.85–7.62)
NEUTS SEG NFR BLD AUTO: 72 % (ref 43–75)
NEUTS SEG NFR BLD AUTO: 77 % (ref 43–75)
NRBC BLD AUTO-RTO: 0 /100 WBCS
NRBC BLD AUTO-RTO: 0 /100 WBCS
PLATELET # BLD AUTO: 267 THOUSANDS/UL (ref 149–390)
PLATELET # BLD AUTO: 282 THOUSANDS/UL (ref 149–390)
PMV BLD AUTO: 8.6 FL (ref 8.9–12.7)
PMV BLD AUTO: 9 FL (ref 8.9–12.7)
POTASSIUM SERPL-SCNC: 5.2 MMOL/L (ref 3.5–5.3)
RBC # BLD AUTO: 2.47 MILLION/UL (ref 3.81–5.12)
RBC # BLD AUTO: 2.76 MILLION/UL (ref 3.81–5.12)
RH BLD: NEGATIVE
SODIUM SERPL-SCNC: 132 MMOL/L (ref 136–145)
SPECIMEN EXPIRATION DATE: NORMAL
WBC # BLD AUTO: 19.32 THOUSAND/UL (ref 4.31–10.16)
WBC # BLD AUTO: 20.55 THOUSAND/UL (ref 4.31–10.16)

## 2017-11-17 PROCEDURE — 86870 RBC ANTIBODY IDENTIFICATION: CPT | Performed by: INTERNAL MEDICINE

## 2017-11-17 PROCEDURE — 85025 COMPLETE CBC W/AUTO DIFF WBC: CPT | Performed by: INTERNAL MEDICINE

## 2017-11-17 PROCEDURE — 82272 OCCULT BLD FECES 1-3 TESTS: CPT | Performed by: INTERNAL MEDICINE

## 2017-11-17 PROCEDURE — 80048 BASIC METABOLIC PNL TOTAL CA: CPT | Performed by: INTERNAL MEDICINE

## 2017-11-17 PROCEDURE — 86900 BLOOD TYPING SEROLOGIC ABO: CPT | Performed by: INTERNAL MEDICINE

## 2017-11-17 PROCEDURE — 94760 N-INVAS EAR/PLS OXIMETRY 1: CPT

## 2017-11-17 PROCEDURE — 82140 ASSAY OF AMMONIA: CPT | Performed by: INTERNAL MEDICINE

## 2017-11-17 PROCEDURE — 86850 RBC ANTIBODY SCREEN: CPT | Performed by: INTERNAL MEDICINE

## 2017-11-17 PROCEDURE — 86921 COMPATIBILITY TEST INCUBATE: CPT

## 2017-11-17 PROCEDURE — 85730 THROMBOPLASTIN TIME PARTIAL: CPT | Performed by: HOSPITALIST

## 2017-11-17 PROCEDURE — P9038 RBC IRRADIATED: HCPCS

## 2017-11-17 PROCEDURE — 86922 COMPATIBILITY TEST ANTIGLOB: CPT

## 2017-11-17 PROCEDURE — 86902 BLOOD TYPE ANTIGEN DONOR EA: CPT

## 2017-11-17 PROCEDURE — 30233N1 TRANSFUSION OF NONAUTOLOGOUS RED BLOOD CELLS INTO PERIPHERAL VEIN, PERCUTANEOUS APPROACH: ICD-10-PCS | Performed by: INTERNAL MEDICINE

## 2017-11-17 PROCEDURE — 86901 BLOOD TYPING SEROLOGIC RH(D): CPT | Performed by: INTERNAL MEDICINE

## 2017-11-17 PROCEDURE — 87493 C DIFF AMPLIFIED PROBE: CPT | Performed by: PHYSICIAN ASSISTANT

## 2017-11-17 PROCEDURE — 82948 REAGENT STRIP/BLOOD GLUCOSE: CPT

## 2017-11-17 RX ORDER — METHYLPREDNISOLONE 4 MG/1
32 TABLET ORAL ONCE
Status: COMPLETED | OUTPATIENT
Start: 2017-11-17 | End: 2017-11-17

## 2017-11-17 RX ORDER — DIPHENHYDRAMINE HCL 25 MG
50 TABLET ORAL ONCE
Status: COMPLETED | OUTPATIENT
Start: 2017-11-18 | End: 2017-11-18

## 2017-11-17 RX ORDER — METHYLPREDNISOLONE 16 MG/1
32 TABLET ORAL ONCE
Status: COMPLETED | OUTPATIENT
Start: 2017-11-17 | End: 2017-11-17

## 2017-11-17 RX ORDER — METHYLPREDNISOLONE 4 MG/1
32 TABLET ORAL ONCE
Status: COMPLETED | OUTPATIENT
Start: 2017-11-18 | End: 2017-11-18

## 2017-11-17 RX ORDER — INSULIN GLARGINE 100 [IU]/ML
15 INJECTION, SOLUTION SUBCUTANEOUS
Status: DISCONTINUED | OUTPATIENT
Start: 2017-11-17 | End: 2017-11-18

## 2017-11-17 RX ADMIN — HYOSCYAMINE SULFATE 1 APPLICATION: 16 SOLUTION at 21:03

## 2017-11-17 RX ADMIN — INSULIN LISPRO 8 UNITS: 100 INJECTION, SOLUTION INTRAVENOUS; SUBCUTANEOUS at 18:02

## 2017-11-17 RX ADMIN — CALCIUM ACETATE 667 MG: 667 CAPSULE ORAL at 16:43

## 2017-11-17 RX ADMIN — DOXYCYCLINE 100 MG: 100 CAPSULE ORAL at 08:42

## 2017-11-17 RX ADMIN — DIPHENHYDRAMINE HYDROCHLORIDE 50 MG: 50 INJECTION, SOLUTION INTRAMUSCULAR; INTRAVENOUS at 07:03

## 2017-11-17 RX ADMIN — METHYLPREDNISOLONE 32 MG: 16 TABLET ORAL at 02:40

## 2017-11-17 RX ADMIN — DOXYCYCLINE 100 MG: 100 CAPSULE ORAL at 17:06

## 2017-11-17 RX ADMIN — MICONAZOLE NITRATE: 20 CREAM TOPICAL at 17:08

## 2017-11-17 RX ADMIN — GUAIFENESIN 200 MG: 100 SOLUTION ORAL at 20:07

## 2017-11-17 RX ADMIN — IPRATROPIUM BROMIDE 2 PUFF: 17 AEROSOL, METERED RESPIRATORY (INHALATION) at 20:07

## 2017-11-17 RX ADMIN — CARBIDOPA AND LEVODOPA 1 TABLET: 25; 100 TABLET ORAL at 08:40

## 2017-11-17 RX ADMIN — GUAIFENESIN 200 MG: 100 SOLUTION ORAL at 08:40

## 2017-11-17 RX ADMIN — HYOSCYAMINE SULFATE 1 APPLICATION: 16 SOLUTION at 16:43

## 2017-11-17 RX ADMIN — METHYLPREDNISOLONE 32 MG: 16 TABLET ORAL at 08:30

## 2017-11-17 RX ADMIN — Medication 100 MG: at 17:10

## 2017-11-17 RX ADMIN — INSULIN LISPRO 4 UNITS: 100 INJECTION, SOLUTION INTRAVENOUS; SUBCUTANEOUS at 01:52

## 2017-11-17 RX ADMIN — LEVOTHYROXINE SODIUM 37.5 MCG: 75 TABLET ORAL at 22:08

## 2017-11-17 RX ADMIN — METOPROLOL TARTRATE 25 MG: 25 TABLET ORAL at 17:07

## 2017-11-17 RX ADMIN — METHYLPREDNISOLONE 32 MG: 16 TABLET ORAL at 20:07

## 2017-11-17 RX ADMIN — MORPHINE SULFATE 5 MG: 100 SOLUTION ORAL at 20:13

## 2017-11-17 RX ADMIN — CARBIDOPA AND LEVODOPA 1 TABLET: 25; 100 TABLET ORAL at 16:43

## 2017-11-17 RX ADMIN — GUAIFENESIN 200 MG: 100 SOLUTION ORAL at 01:52

## 2017-11-17 RX ADMIN — MIDODRINE HYDROCHLORIDE 5 MG: 5 TABLET ORAL at 20:07

## 2017-11-17 RX ADMIN — INSULIN GLARGINE 15 UNITS: 100 INJECTION, SOLUTION SUBCUTANEOUS at 22:09

## 2017-11-17 RX ADMIN — CARBIDOPA AND LEVODOPA 1 TABLET: 25; 100 TABLET ORAL at 20:07

## 2017-11-17 RX ADMIN — HYOSCYAMINE SULFATE 1 APPLICATION: 16 SOLUTION at 08:43

## 2017-11-17 RX ADMIN — Medication 100 MG: at 08:46

## 2017-11-17 RX ADMIN — CALCIUM ACETATE 667 MG: 667 CAPSULE ORAL at 07:40

## 2017-11-17 RX ADMIN — METOPROLOL TARTRATE 25 MG: 25 TABLET ORAL at 08:42

## 2017-11-17 RX ADMIN — INSULIN LISPRO 6 UNITS: 100 INJECTION, SOLUTION INTRAVENOUS; SUBCUTANEOUS at 05:57

## 2017-11-17 RX ADMIN — MICONAZOLE NITRATE: 20 CREAM TOPICAL at 08:42

## 2017-11-17 RX ADMIN — MIDODRINE HYDROCHLORIDE 5 MG: 5 TABLET ORAL at 08:43

## 2017-11-17 RX ADMIN — MIDODRINE HYDROCHLORIDE 5 MG: 5 TABLET ORAL at 16:43

## 2017-11-17 RX ADMIN — IPRATROPIUM BROMIDE 2 PUFF: 17 AEROSOL, METERED RESPIRATORY (INHALATION) at 16:43

## 2017-11-17 NOTE — PROGRESS NOTES
Progress Note - Nephrology   Kaci Evans 68 y o  female MRN: [id number]  [facility] Encounter: [id number]      Assessment / Plan:  1  ESRD on HD TTS transitiioning to [facility]): seen by me on HD today  -continue nephrocaps  2  Access: Hero AVG with recent bleeding at outpatient HD unit  Vascular consulted  Discussed with SLIM  No overt signs of infection  F/u blood culture, no issue with cannulation today or issues with bleeding post cannulation yesterday  3  Hypertension: on metoprolol 25mg po BID, on midodrine 5mg po TID for hypotension  4  Anemia of CKD: monitor H&H, Hgb at goal on admission, now down to 6 6, transfuse prn  Has antibodies  No overt signs of bleeding from AVG  Not on SAIRA outpatient  5  Mineral and bone disease: monitor phos/PTH outpatient, on phoslo 1 tab TID with meals  6  Sacral decubitus ulcer (POA) - surgery follows  7  DM2 - on insulin per primary team  8  SIRS - no clear source of infection, no erythema overlying AVG, ID on board, f/u blood culture, on cefepime for now  ? Need for ongoing jara  Agree with ID   9   Pseudohyponatremia-due to hyperglycemia  Corrects to normal range when corrected for blood sugar  10   High normal potassium-correct on dialysis        Subjective:   Patient seen examined on dialysis approximately 1:45 p m  /46, P 66, Qb 300ml/m, Qd 700ml/m, UF goal 1L  Tolerating HD well  Nonverbal       Objective:     Vitals: Blood pressure (!) 118/45, pulse 64, temperature 98 5 °F (36 9 °C), temperature source Tympanic, resp  rate 18, height 5' 2" (1 575 m), weight 65 3 kg (143 lb 15 4 oz), SpO2 92 %  ,Body mass index is 26 33 kg/m²  Temp (24hrs), Av 9 °F (37 2 °C), Min:97 6 °F (36 4 °C), Max:99 8 °F (37 7 °C)      Weight (last 2 days)     Date/Time   Weight    17 0600  65 3 (143 96)    11/15/17 0100  65 2 (143 8)                Intake/Output Summary (Last 24 hours) at 17 1340  Last data filed at 17 1125   Gross per 24 hour   Intake             1140 ml   Output               20 ml   Net             1120 ml     I/O last 24 hours: In: 2300 [I V :700;  Other:100; NG/GT:1500]  Out: 20 [Urine:20]        Physical Exam:   Gen: NAD  Neck: supple  CV: +s1s2, no friction rub, +murmur  Pulm: clear b/l  Abdomen: soft, ND, NT, +peg tube  Ext: no edema  Neuro: awake, alert  Skin: warm, dry  : +jara    Invasive Devices     Peripheral Intravenous Line            Peripheral IV 11/17/17 Left Wrist less than 1 day          Line            Hemodialysis AV Fistula Upper arm -- days          Drain            Gastrostomy/Enterostomy PEG-jejunostomy LUQ -- days    Urethral Catheter Latex 2 days                Medications:    Scheduled Meds:  calcium acetate 667 mg Oral TID With Meals   carbidopa-levodopa 1 tablet Per PEG Tube TID   doxycycline hyclate 100 mg Per PEG Tube BID   guaiFENesin 200 mg Per PEG Tube Q6H   insulin lispro 1-6 Units Subcutaneous Q6H JAYLAN   ipratropium 2 puff Inhalation TID   lacosamide 100 mg Per PEG Tube BID   levothyroxine 37 5 mcg Per PEG Tube HS   metoprolol tartrate 25 mg Per PEG Tube BID   miconazole  Topical BID   midodrine 5 mg Per PEG Tube TID   sodium hypochlorite 1 application Irrigation TID       PRN Meds:   acetaminophen    bisacodyl    morphine    ondansetron    Continuous Infusions:         LAB RESULTS:        Results from last 7 days  Lab Units 11/17/17  1135 11/17/17  0730 11/16/17  0520 11/15/17  0546 11/14/17  1727   WBC Thousand/uL 19 32* 20 55*  --  17 20* 23 52*   HEMOGLOBIN g/dL 6 6* 7 4*  --  7 1* 10 0*   HEMATOCRIT % 22 0* 24 8*  --  25 1* 34 1*   PLATELETS Thousands/uL 267 282  --  308 430*   NEUTROS PCT % 72 77*  --   --   --    LYMPHS PCT % 26 22  --   --   --    LYMPHO PCT %  --   --   --   --  21   MONOS PCT % 2* 1*  --   --   --    MONO PCT MAN %  --   --   --   --  5   EOS PCT % 0 0  --   --   --    EOSINO PCT MANUAL %  --   --   --   --  1   SODIUM mmol/L  --  132* 140 141 144   POTASSIUM mmol/L  --  5 2 5 1 3 5 3 5   CHLORIDE mmol/L  --  93* 100 100 98*   CO2 mmol/L  --  29 31 35* 36*   BUN mg/dL  --  48* 54* 36* 24   CREATININE mg/dL  --  2 49* 3 49* 2 34* 1 75*   CALCIUM mg/dL  --  8 6 8 6 8 5 8 8   ALBUMIN g/dL  --   --  2 5* 2 7* 2 8*   TOTAL PROTEIN g/dL  --   --  6 5 6 6 7 4   BILIRUBIN TOTAL mg/dL  --   --  0 77 0 72 0 50   ALK PHOS U/L  --   --  117* 115 147*   ALT U/L  --   --  9* 9* 9*   AST U/L  --   --  46* 24 19   GLUCOSE RANDOM mg/dL  --  358* 149* 93 175*   MAGNESIUM mg/dL  --   --   --  2 4  --    PHOSPHORUS mg/dL  --   --   --  3 3  --        CUTURES:  Lab Results   Component Value Date    BLOODCX No Growth at 24 hrs  11/14/2017    BLOODCX No Growth After 5 Days  05/25/2017    BLOODCX No Growth After 5 Days  05/25/2017    BLOODCX Staphylococcus epidermidis 05/22/2017    BLOODCX Staphylococcus epidermidis 05/21/2017    BLOODCX Staphylococcus capitis 05/21/2017    URINECX No Growth <1000 cfu/mL 11/14/2017                 Portions of the record may have been created with voice recognition software  Occasional wrong word or "sound a like" substitutions may have occurred due to the inherent limitations of voice recognition software  Read the chart carefully and recognize, using context, where substitutions have occurred  If you have any questions, please contact the dictating provider

## 2017-11-17 NOTE — PLAN OF CARE
DISCHARGE PLANNING - CARE MANAGEMENT     Discharge to post-acute care or home with appropriate resources Progressing        INFECTION - ADULT     Absence or prevention of progression during hospitalization Progressing        Nutrition/Hydration-ADULT     Nutrient/Hydration intake appropriate for improving, restoring or maintaining nutritional needs Progressing        PAIN - ADULT     Verbalizes/displays adequate comfort level or baseline comfort level Progressing        Potential for Falls     Patient will remain free of falls Progressing        Prexisting or High Potential for Compromised Skin Integrity     Skin integrity is maintained or improved Progressing

## 2017-11-17 NOTE — HEMODIALYSIS
I called [facility] to informed them a sample for type and screen was sent    They informed me that the patient has many antibodies and blood would not be ready to be given while the patient is on hemodialysis  Jolynn Valderrama her nurse informed to give the blood on the floor  [facility] notified to inform PP8 when the blood would be ready  Patient cannulated by IR with an 18g needle  Dr Rosemary Hubbard informed that the blood would be given on the floor when available

## 2017-11-17 NOTE — PROGRESS NOTES
Progress Note - Infectious Disease   James Fish 68 y o  female MRN: [id number]  Unit/Bed#: [facility] 816-01 Encounter: [id number]      Impression/Recommendations:  1   SIRS versus sepsis  POA:  Leukocytosis and tachycardia  Evolving fever concerning for evolving infection  Blood cultures initially negative, repeat pending  Urine culture negative but was on antibiotics on admission (Doxycycline)  Consider occult infection given recent complicated medical course   Hemodynamically stable and non-toxic  Rec:       · Continue cefepime for now  · Follow up repeat blood cultures  · Follow up CT C/A/P  · Follow temperatures and white blood cell count closely  · Supportive care as per the primary service  · Further workup and ultimate treatment will be determined by the above-mentioned studies and the patient's overall clinical course     2   Pyuria  Unclear if the patient has active UTI as she presented for alternative issues  Urine culture negative but was on antibiotics on admission  Has history of ?ureteral stent in August  Rec:       · Continue cefepime as above  · Continue Barker catheter for wound care in setting of #3     3   Chronic stage IV sacral decubitus ulcer  No evidence for acute purulence or cellulitis  Status post bedside debridement per surgery  Rec:       · Continue LWC and offloading as possible per surgery     4   Ambulatory dysfunction with multiple admits/comorbidities     5   ESRD on HD  Via RUE AVG     6   Disposition  Given multiple comorbidities including aphasia, uncertain cognitive status, end-stage renal disease on HD, PEG tube feeding, bed-bound state, and large chronic decubitus ulcer, quality of life appears to be questionable  Would consider palliative care consultation     Discussed in detail with Dr Ge Rajput      Antibiotics:  Cefepime #2  (Doxycycline)    Subjective:  Patient seen on AM rounds  Patient lethargic after receiving IV Benadryl as prep for CT  Unable to provide a ROS  Bedside RN thinks she is in discomfort during multiple blood draws and IV attempts  No documented fevers, chills, sweats, nausea, vomiting, or diarrhea  Objective:  Vitals:  HR:  [] 60  Resp:  [15-18] 18  BP: ()/(13-67) 128/58  SpO2:  [92 %-100 %] 92 %  Temp (24hrs), Av 7 °F (37 1 °C), Min:97 6 °F (36 4 °C), Max:99 8 °F (37 7 °C)  Current: Temperature: 98 9 °F (37 2 °C)    Physical Exam:   General:  No acute distress  Eyes:  Normal lids and conjunctivae  ENT:  Normal external ears and nose  Neck:  Neck symmetric with midline trachea  Pulmonary:  Normal respiratory effort without accessory muscle use  Cardiovascular:  Regular rate and rhythm; no peripheral edema  Gastrointestinal:  No tenderness or distention  Musculoskeletal:  No digital clubbing or cyanosis  Skin:  No visible rashes; No palpable nodules  Neurologic:  Unable to assess due to lethargy  Psychiatric:  Lethargic    Lab Results:  I have personally reviewed pertinent labs      Results from last 7 days  Lab Units 17  0730 17  0520 11/15/17  0546 17  1727   SODIUM mmol/L 132* 140 141 144   POTASSIUM mmol/L 5 2 5 1 3 5 3 5   CHLORIDE mmol/L 93* 100 100 98*   CO2 mmol/L 29 31 35* 36*   ANION GAP mmol/L 10 9 6 10   BUN mg/dL 48* 54* 36* 24   CREATININE mg/dL 2 49* 3 49* 2 34* 1 75*   EGFR ml/min/1 73sq m 18 12 19 28   GLUCOSE RANDOM mg/dL 358* 149* 93 175*   CALCIUM mg/dL 8 6 8 6 8 5 8 8   AST U/L  --  46* 24 19   ALT U/L  --  9* 9* 9*   ALK PHOS U/L  --  117* 115 147*   TOTAL PROTEIN g/dL  --  6 5 6 6 7 4   ALBUMIN g/dL  --  2 5* 2 7* 2 8*   BILIRUBIN TOTAL mg/dL  --  0 77 0 72 0 50       Results from last 7 days  Lab Units 17  0730 11/15/17  0546 17  1727   WBC Thousand/uL 20 55* 17 20* 23 52*   HEMOGLOBIN g/dL 7 4* 7 1* 10 0*   PLATELETS Thousands/uL 282 308 430*       Results from last 7 days  Lab Units 11/15/17  0150 17  2230 17  1856 17  1727   BLOOD CULTURE   --   --   --  No Growth at 24 hrs  GRAM STAIN RESULT   --   --  No Polys or Bacteria seen  --    URINE CULTURE   --  No Growth <1000 cfu/mL  --   --    WOUND CULTURE   --   --  2+ Growth of Proteus species*  2+ Growth of Escherichia coli ESBL*  --    MRSA CULTURE ONLY  No Methicillin Resistant Staphlyococcus aureus (MRSA) isolated  --   --   --        Imaging Studies:   I have personally reviewed pertinent imaging study reports and images in PACS  EKG, Pathology, and Other Studies:   I have personally reviewed pertinent reports

## 2017-11-17 NOTE — PROGRESS NOTES
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 ([facility] )    Acute cystitis      Plan:    · SIRS vs  Sepsis POA -   · Antibiotics - Cefepime started 11/16/2017 empirically  · Various potential sources present including ulcer infection, urinary infection, potential aspiration, other  · Evaluations -   · Follow up blood cultures from 11/16/2017  · Get CT Chest/Abdomen / Pelvis  Attempted to do prep for dye allergy, but then unable to get 20 gauge IV started  Will do a non-contrast CT scan  · Would culture from ulcer shows ESBL E coli and proteus with pending sensitivities  Follow up final wound culture result  Consider antibiotic change? · Follow up stool for C-diff toxin  · Note: Per MAR and discussion with staff from [facility], patient is listed as being on doxycycline prehospital for diagnosis of "endocarditis"  Follow up blood culture results  · Chronic  Stage IV Sacral Decubitus Ulcer POA -   · Follow up final result of Wound culture done in the ER in [facility]  · Procedure - Bedside Incision and Drainage / Wound Debridement by Dr Alfa Chavira / Dr Juaquin Burgess on 11/15/2017  · Local Wound Care - BID dressing changes with betadine and Kerlix  · Asymptomatic Pyuria -   · ID recommends following off antibiotics  · On 11/15 when I spoke with the  he stated that patient still has a stent placed for "kidney blockage" in August   Information passed on to ID team     · Urine culture is negative    · Bleeding from AV Graft Site - No reported bleeding from the graft in last dialysis session but will continue to monitor with dialysis sessions  They have also not been having difficulty using the graft  · Anemia of Chronic Disease with possible Acute Blood Loss Anemia -   · At baseline, most hemoglobins are 7 0 - 8 0     · Hemoglobin is down to 6 6 today, so we will transfuse patient 1 unit PRBC  · Also, as there has been no significant bleeding from the graft after dialysis yesterday (down at dialysis currently today), we will check stool for blood just to rule this out as a cause for any blood losses  · ESRD on HD - Nephrology following  · Diabetes Mellitus Type 2 - Continue current regimen  Monitor blood glucose levels  · Acute Encephalopathy -   · Check NH3  · Negative testing - TSH, B12   · Monitor for any infections  · Medication Review - sinemet, vimpat, PRN morphine are all potential contributors  · Consider neurologist evaluation if symptoms persist   · Dysphagia - The patient is normally on tube feeding only (NPO)  On 11/15, restarted prehospital tube feeds of nepro at 50 ml/hr and FWF of 60 ml every 6 hours per my discussion with staff at [facility]  Aspiration precautions with HOB > 30 degrees at all times  · Level of Care - Currently Level 2 Stepdown  Maintain for now given fever  Monitor for any sepsis  · VTE Prophylaxis - will hold off as we rule out any bleeding  · Goals of Care - discussed this with  who said that numerous times they have had this discussion about palliative care options  Patient has made up her mind that she has wanted to continue on with every aspect of medical care except for the fact that she wishes to be DNR / DNI  Will hold off on palliative care consultation short term  VTE Pharmacologic Prophylaxis:   Pharmacologic: coagulopathy POA  Mechanical VTE Prophylaxis in Place: Yes    Patient Centered Rounds: I have performed bedside rounds with nursing staff today      Discussions with Specialists or Other Care Team Provider: Dr Srinivasan Zurita, Dr Mayi Flynn, IR team     Education and Discussions with Family / Patient: Addendum - updated Roc Rosa when he called back  Main contact is  Roc Rosa at   Time Spent for Care: 45 minutes  More than 50% of total time spent on counseling and coordination of care as described above  Current Length of Stay: 2 day(s)    Current Patient Status: Inpatient   Certification Statement: The patient will continue to require additional inpatient hospital stay due to evaluations for the SIRS response, bleeding fistula, and pressure ulcer  Discharge Plan / Estimated Discharge Date: to be determined  Code Status: Level 3 - DNAR and DNI      Subjective: The patient has no acute complaints currently  The patient has no major events overnight  This morning though, she is noted to have decreased hemoglobin  No obvious bleeding recently from the dialysis graft though  Nursing unable to establish IV access at antecubital location or more proximally this morning so has not yet had CT done  Objective:     Vitals:   Temp (24hrs), Av 7 °F (37 1 °C), Min:97 6 °F (36 4 °C), Max:99 8 °F (37 7 °C)    HR:  [] 64  Resp:  [15-18] 18  BP: ()/(13-67) 114/43  SpO2:  [92 %-100 %] 92 %  Body mass index is 26 33 kg/m²  Input and Output Summary (last 24 hours): Intake/Output Summary (Last 24 hours) at 17 1218  Last data filed at 17 1125   Gross per 24 hour   Intake             1540 ml   Output               20 ml   Net             1520 ml       Physical Exam:     Physical Exam   Constitutional: No distress  HENT:   Somewhat dry oral mucosa  Eyes: Conjunctivae are normal  Pupils are equal, round, and reactive to light  Cardiovascular: Normal rate and regular rhythm  No murmur heard  Pulmonary/Chest: She has no wheezes  She has no rales  Decreased breath sounds   Abdominal: Soft  Bowel sounds are normal  She exhibits no distension   There is no tenderness  PEG site intact  No erythema or drainage  Genitourinary:   Genitourinary Comments: Barker catheter - small amount of brownish urine   Musculoskeletal: She exhibits no edema  Neurological:   Patient stares when speaking to her  She has chronic upper extremity contractures  The patient does not follow commands, but just closes eyes again  Vitals reviewed  Additional Data:     Labs:      Results from last 7 days  Lab Units 11/17/17  1135   WBC Thousand/uL 19 32*   HEMOGLOBIN g/dL 6 6*   HEMATOCRIT % 22 0*   PLATELETS Thousands/uL 267   NEUTROS PCT % 72   LYMPHS PCT % 26   MONOS PCT % 2*   EOS PCT % 0       Results from last 7 days  Lab Units 11/17/17  0730 11/16/17  0520   SODIUM mmol/L 132* 140   POTASSIUM mmol/L 5 2 5 1   CHLORIDE mmol/L 93* 100   CO2 mmol/L 29 31   BUN mg/dL 48* 54*   CREATININE mg/dL 2 49* 3 49*   CALCIUM mg/dL 8 6 8 6   TOTAL PROTEIN g/dL  --  6 5   BILIRUBIN TOTAL mg/dL  --  0 77   ALK PHOS U/L  --  117*   ALT U/L  --  9*   AST U/L  --  46*   GLUCOSE RANDOM mg/dL 358* 149*       Results from last 7 days  Lab Units 11/15/17  0547   INR  1 41*       * I Have Reviewed All Lab Data Listed Above  * Additional Pertinent Lab Tests Reviewed: [facility]    Imaging:    Imaging Reports Reviewed Today Include: Chest Xray  Imaging Personally Reviewed by Myself Includes:  None    Recent Cultures (last 7 days):       Results from last 7 days  Lab Units 11/14/17  2230 11/14/17  1856 11/14/17  1727   BLOOD CULTURE   --   --  No Growth at 24 hrs     GRAM STAIN RESULT   --  No Polys or Bacteria seen  --    URINE CULTURE  No Growth <1000 cfu/mL  --   --    WOUND CULTURE   --  2+ Growth of Proteus species*  2+ Growth of Escherichia coli ESBL*  --        Last 24 Hours Medication List:     calcium acetate 667 mg Oral TID With Meals   carbidopa-levodopa 1 tablet Per PEG Tube TID   doxycycline hyclate 100 mg Per PEG Tube BID   guaiFENesin 200 mg Per PEG Tube Q6H   insulin lispro 1-6 Units Subcutaneous Q6H JAYLAN   ipratropium 2 puff Inhalation TID   lacosamide 100 mg Per PEG Tube BID   levothyroxine 37 5 mcg Per PEG Tube HS   metoprolol tartrate 25 mg Per PEG Tube BID   miconazole  Topical BID   midodrine 5 mg Per PEG Tube TID   sodium hypochlorite 1 application Irrigation TID        Today, Patient Was Seen By: Liz Serrato DO    ** Please Note: This note has been constructed using a voice recognition system   **

## 2017-11-18 ENCOUNTER — APPOINTMENT (INPATIENT)
Dept: RADIOLOGY | Facility: HOSPITAL | Age: 77
DRG: 853 | End: 2017-11-18
Payer: MEDICARE

## 2017-11-18 PROBLEM — R53.2 FUNCTIONAL QUADRIPLEGIA (HCC): Status: ACTIVE | Noted: 2017-11-18

## 2017-11-18 LAB
ABO GROUP BLD BPU: NORMAL
BPU ID: NORMAL
C DIFF TOX GENS STL QL NAA+PROBE: NORMAL
CROSSMATCH: NORMAL
GLUCOSE SERPL-MCNC: 284 MG/DL (ref 65–140)
GLUCOSE SERPL-MCNC: 301 MG/DL (ref 65–140)
GLUCOSE SERPL-MCNC: 364 MG/DL (ref 65–140)
UNIT DISPENSE STATUS: NORMAL
UNIT PRODUCT CODE: NORMAL
UNIT RH: NORMAL

## 2017-11-18 PROCEDURE — 74177 CT ABD & PELVIS W/CONTRAST: CPT

## 2017-11-18 PROCEDURE — 82948 REAGENT STRIP/BLOOD GLUCOSE: CPT

## 2017-11-18 PROCEDURE — 71260 CT THORAX DX C+: CPT

## 2017-11-18 RX ORDER — TRAMADOL HYDROCHLORIDE 50 MG/1
25 TABLET ORAL EVERY 6 HOURS PRN
Status: DISCONTINUED | OUTPATIENT
Start: 2017-11-18 | End: 2017-11-22 | Stop reason: HOSPADM

## 2017-11-18 RX ORDER — INSULIN GLARGINE 100 [IU]/ML
25 INJECTION, SOLUTION SUBCUTANEOUS
Status: DISCONTINUED | OUTPATIENT
Start: 2017-11-18 | End: 2017-11-19

## 2017-11-18 RX ADMIN — METOPROLOL TARTRATE 25 MG: 25 TABLET ORAL at 09:06

## 2017-11-18 RX ADMIN — INSULIN LISPRO 10 UNITS: 100 INJECTION, SOLUTION INTRAVENOUS; SUBCUTANEOUS at 18:58

## 2017-11-18 RX ADMIN — INSULIN LISPRO 6 UNITS: 100 INJECTION, SOLUTION INTRAVENOUS; SUBCUTANEOUS at 00:33

## 2017-11-18 RX ADMIN — Medication 100 MG: at 09:06

## 2017-11-18 RX ADMIN — MIDODRINE HYDROCHLORIDE 5 MG: 5 TABLET ORAL at 17:33

## 2017-11-18 RX ADMIN — MIDODRINE HYDROCHLORIDE 5 MG: 5 TABLET ORAL at 09:07

## 2017-11-18 RX ADMIN — DOXYCYCLINE 100 MG: 100 CAPSULE ORAL at 09:05

## 2017-11-18 RX ADMIN — CARBIDOPA AND LEVODOPA 1 TABLET: 25; 100 TABLET ORAL at 17:32

## 2017-11-18 RX ADMIN — CARBIDOPA AND LEVODOPA 1 TABLET: 25; 100 TABLET ORAL at 22:17

## 2017-11-18 RX ADMIN — IPRATROPIUM BROMIDE 2 PUFF: 17 AEROSOL, METERED RESPIRATORY (INHALATION) at 09:22

## 2017-11-18 RX ADMIN — IPRATROPIUM BROMIDE 2 PUFF: 17 AEROSOL, METERED RESPIRATORY (INHALATION) at 22:18

## 2017-11-18 RX ADMIN — GUAIFENESIN 200 MG: 100 SOLUTION ORAL at 22:17

## 2017-11-18 RX ADMIN — INSULIN GLARGINE 25 UNITS: 100 INJECTION, SOLUTION SUBCUTANEOUS at 22:17

## 2017-11-18 RX ADMIN — CALCIUM ACETATE 667 MG: 667 CAPSULE ORAL at 09:06

## 2017-11-18 RX ADMIN — TRAMADOL HYDROCHLORIDE 25 MG: 50 TABLET, FILM COATED ORAL at 14:32

## 2017-11-18 RX ADMIN — INSULIN LISPRO 6 UNITS: 100 INJECTION, SOLUTION INTRAVENOUS; SUBCUTANEOUS at 11:40

## 2017-11-18 RX ADMIN — GUAIFENESIN 200 MG: 100 SOLUTION ORAL at 02:31

## 2017-11-18 RX ADMIN — ACETAMINOPHEN 650 MG: 325 TABLET, FILM COATED ORAL at 11:33

## 2017-11-18 RX ADMIN — LEVOTHYROXINE SODIUM 37.5 MCG: 75 TABLET ORAL at 22:17

## 2017-11-18 RX ADMIN — IOHEXOL 100 ML: 350 INJECTION, SOLUTION INTRAVENOUS at 07:59

## 2017-11-18 RX ADMIN — CEFEPIME HYDROCHLORIDE 1000 MG: 1 INJECTION, POWDER, FOR SOLUTION INTRAMUSCULAR; INTRAVENOUS at 17:48

## 2017-11-18 RX ADMIN — DOXYCYCLINE 100 MG: 100 CAPSULE ORAL at 17:34

## 2017-11-18 RX ADMIN — MIDODRINE HYDROCHLORIDE 5 MG: 5 TABLET ORAL at 22:18

## 2017-11-18 RX ADMIN — CARBIDOPA AND LEVODOPA 1 TABLET: 25; 100 TABLET ORAL at 09:04

## 2017-11-18 RX ADMIN — GUAIFENESIN 200 MG: 100 SOLUTION ORAL at 09:05

## 2017-11-18 RX ADMIN — INSULIN LISPRO 8 UNITS: 100 INJECTION, SOLUTION INTRAVENOUS; SUBCUTANEOUS at 06:25

## 2017-11-18 RX ADMIN — MORPHINE SULFATE 5 MG: 100 SOLUTION ORAL at 02:45

## 2017-11-18 RX ADMIN — MICONAZOLE NITRATE: 20 CREAM TOPICAL at 17:34

## 2017-11-18 RX ADMIN — CALCIUM ACETATE 667 MG: 667 CAPSULE ORAL at 11:33

## 2017-11-18 RX ADMIN — METOPROLOL TARTRATE 25 MG: 25 TABLET ORAL at 17:33

## 2017-11-18 RX ADMIN — DIPHENHYDRAMINE HCL 50 MG: 25 TABLET ORAL at 06:42

## 2017-11-18 RX ADMIN — Medication 100 MG: at 17:38

## 2017-11-18 RX ADMIN — CALCIUM ACETATE 667 MG: 667 CAPSULE ORAL at 17:32

## 2017-11-18 RX ADMIN — GUAIFENESIN 200 MG: 100 SOLUTION ORAL at 14:31

## 2017-11-18 RX ADMIN — IPRATROPIUM BROMIDE 2 PUFF: 17 AEROSOL, METERED RESPIRATORY (INHALATION) at 17:46

## 2017-11-18 RX ADMIN — METHYLPREDNISOLONE 32 MG: 4 TABLET ORAL at 06:42

## 2017-11-18 NOTE — PROGRESS NOTES
Progress Note - Infectious Disease   Kaci Evans 68 y o  female MRN: [id number]  Unit/Bed#: [facility] 816-01 Encounter: [id number]      Impression/Recommendations:  1   SIRS versus sepsis  POA:  Leukocytosis and tachycardia  Evolving fever concerning for evolving infection  Blood cultures, C  Diff negative  Urine culture negative but was on antibiotics on admission (Doxycycline)  Sacral wound not acutely infected  Consider occult infection given recent complicated medical course   Hemodynamically stable and non-toxic  Markedly improved today  Rec:       · Continue cefepime for now  · Follow up final repeat blood cultures  · Follow up CT C/A/P  · Follow temperatures and white blood cell count closely  · Supportive care as per the primary service     2   Acute encephalopathy  Suspect multifactorial, ?toxic-metabolic  Suspect underlying baseline cognitive dysfunction  Markedly improved today, ?role of antibiotics  Rec:  · Continue antibiotics as above  · Follow mental status closely  · Continue supportive care as per the primary service  · As more alert today would continue to readdress goals of care     3   Chronic stage IV sacral decubitus ulcer  No evidence for acute purulence or cellulitis  Status post bedside debridement per surgery  Rec:       · Continue LWC and offloading as possible per surgery     4   ESRD on HD  Via RUE AVG     5   History of endocarditis  Recently treated at OSH  Rec:  · Continue doxycycline  · Await outside hospital records    6  Disposition  Consider palliative care consultation for symptom management and possible consideration of comfort care     Antibiotics:  Cefepime #3  (Doxycycline)    Subjective:  Patient seen on AM rounds  Patient more awake and conversational today  States that her bottom hurts  Does not like being in the hospital and finds interventions uncomfortable  No documented fevers, chills, sweats, nausea, vomiting, or diarrhea      Objective:  Vitals:  HR:  Marcela Benito 60  Resp:  [16-20] 18  BP: (115-157)/(17-70) 157/68  SpO2:  [94 %-100 %] 95 %  Temp (24hrs), Av °F (36 7 °C), Min:96 1 °F (35 6 °C), Max:98 6 °F (37 °C)  Current: Temperature: 98 °F (36 7 °C)    Physical Exam:   General:  No acute distress  Eyes:  Normal lids and conjunctivae  ENT:  Normal external ears and nose  Neck:  Neck symmetric with midline trachea  Pulmonary:  Normal respiratory effort without accessory muscle use  Cardiovascular:  Regular rate and rhythm; no peripheral edema  Gastrointestinal:  No tenderness or distention  Musculoskeletal:  No digital clubbing or cyanosis  Skin:  No visible rashes; No palpable nodules  Neurologic:  Sensation grossly intact to light touch  Psychiatric:  Alert and conversational   Rina Going she's in the hospital     Lab Results:  I have personally reviewed pertinent labs  Results from last 7 days  Lab Units 17  0730 17  0520 11/15/17  0546 17  1727   SODIUM mmol/L 132* 140 141 144   POTASSIUM mmol/L 5 2 5 1 3 5 3 5   CHLORIDE mmol/L 93* 100 100 98*   CO2 mmol/L 29 31 35* 36*   ANION GAP mmol/L 10 9 6 10   BUN mg/dL 48* 54* 36* 24   CREATININE mg/dL 2 49* 3 49* 2 34* 1 75*   EGFR ml/min/1 73sq m 18 12 19 28   GLUCOSE RANDOM mg/dL 358* 149* 93 175*   CALCIUM mg/dL 8 6 8 6 8 5 8 8   AST U/L  --  46* 24 19   ALT U/L  --  9* 9* 9*   ALK PHOS U/L  --  117* 115 147*   TOTAL PROTEIN g/dL  --  6 5 6 6 7 4   ALBUMIN g/dL  --  2 5* 2 7* 2 8*   BILIRUBIN TOTAL mg/dL  --  0 77 0 72 0 50       Results from last 7 days  Lab Units 17  1135 17  0730 11/15/17  0546   WBC Thousand/uL 19 32* 20 55* 17 20*   HEMOGLOBIN g/dL 6 6* 7 4* 7 1*   PLATELETS Thousands/uL 267 282 308       Results from last 7 days  Lab Units 17  2115 17  1004 17  1003 11/15/17  0150 17  2230 17  1856 17  1727   BLOOD CULTURE   --  No Growth at 24 hrs  No Growth at 24 hrs   --   --   --  No Growth at 48 hrs     GRAM STAIN RESULT   --   --   --   --   -- No Polys or Bacteria seen  --    URINE CULTURE   --   --   --   --  No Growth <1000 cfu/mL  --   --    WOUND CULTURE   --   --   --   --   --  2+ Growth of Proteus mirabilis*  2+ Growth of Escherichia coli ESBL*  --    MRSA CULTURE ONLY   --   --   --  No Methicillin Resistant Staphlyococcus aureus (MRSA) isolated  --   --   --    C DIFF TOXIN B  NEGATIVE for C difficle toxin by PCR    --   --   --   --   --   --        Imaging Studies:   I have personally reviewed pertinent imaging study reports and images in PACS  EKG, Pathology, and Other Studies:   I have personally reviewed pertinent reports

## 2017-11-18 NOTE — PROGRESS NOTES
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 ([place] [address] )    Acute cystitis      Plan:    · SIRS vs  Sepsis POA -   · Antibiotics - Cefepime started 11/16/2017 empirically  · Various potential sources considered on admission included ulcer infection, urinary infection, potential aspiration, other  With normal CT and negative urine culture, wound infection is the most likely source now  · Evaluations -   · Follow up blood cultures from 11/16/2017  · CT negative for acute pathology  · Would culture from ulcer shows ESBL E coli and proteus with pending sensitivities  Follow up final wound culture result  Consider antibiotic change? · C-diff toxin was negative  · Note: Per MAR and discussion with staff from [facility], patient is listed as being on doxycycline prehospital for diagnosis of "endocarditis"  Follow up blood culture results  · Chronic  Stage IV Sacral Decubitus Ulcer POA -   · Follow up final result of Wound culture done in the ER in [facility]  · Procedure - Bedside Incision and Drainage / Wound Debridement by Dr Efrain Reynoso / Dr Fredi Leung on 11/15/2017  · Was using a lidocaine based cream topically prehospital   Will discuss with ID whether we are ok to use or if this would encourage further infection? · Local Wound Care - BID dressing changes with betadine and Kerlix  · Asymptomatic Pyuria -   · ID recommends following off antibiotics     · On 11/15 when I spoke with the  he stated that patient still has a stent placed for "kidney blockage" in August  Information passed on to ID team     · Urine culture is negative  · Bleeding from AV Graft Site - No reported bleeding from the graft in last dialysis session but will continue to monitor with dialysis sessions  They have also not been having difficulty using the graft  · Anemia of Chronic Disease with possible Acute Blood Loss Anemia -   · At baseline, most hemoglobins are 7 0 - 8 0     · Hemoglobin is down to 6 6 11/17/17, so transfused 1 unit PRBC  Will recheck counts today - late blood draw  · Also, as there has been no significant bleeding from the graft after dialysis yesterday (down at dialysis currently today)  · Stool was negative for blood  · CBCD in am   · ESRD on HD - Nephrology following  · Diabetes Mellitus Type 2 - Increase lantus to 25 units given hyperglycemia  Monitor blood glucose levels  · Acute Encephalopathy -   · Negative testing - TSH, B12, NH3  · Monitor for any infections  · Medication Review - sinemet, vimpat, PRN morphine are all potential contributors  · Consider neurologist evaluation if symptoms persist   · Dysphagia - The patient is normally on tube feeding only (NPO)  On 11/15, restarted prehospital tube feeds of nepro at 50 ml/hr and FWF of 60 ml every 6 hours per my discussion with staff at [facility]  Aspiration precautions with HOB > 30 degrees at all times  · Level of Care - Currently Level 2 Stepdown  Maintain for now given fever  Monitor for any sepsis  · VTE Prophylaxis - will hold off as we rule out any bleeding  · Goals of Care - discussed this with  11/17/2017 who said that numerous times they have had this discussion about palliative care options  Patient has made up her mind that she has wanted to continue on with every aspect of medical care except for the fact that she wishes to be DNR / DNI  Will hold off on palliative care consultation short term  VTE Pharmacologic Prophylaxis:   Pharmacologic: coagulopathy POA    Mechanical VTE Prophylaxis in Place: Yes    Patient Centered Rounds: I have performed bedside rounds with nursing staff today  Discussions with Specialists or Other Care Team Provider: None today  Education and Discussions with Family / Patient: updated  at bedside  Main contact is  Michelle Lindsay at   Time Spent for Care: 30 minutes  More than 50% of total time spent on counseling and coordination of care as described above  Current Length of Stay: 3 day(s)    Current Patient Status: Inpatient   Certification Statement: The patient will continue to require additional inpatient hospital stay due to evaluations for the SIRS response, bleeding fistula, and pressure ulcer  Discharge Plan / Estimated Discharge Date: to be determined  Code Status: Level 3 - DNAR and DNI      Subjective: The patient's  feels that she is doing a little better today  She has been "talking a lot today" to him  When I go to see her it is right after getting a tramadol and she is a bit more sleepy, but similar to yesterday which was an improvement from prior days  She tolerated getting CT scan done this morning  The patient had no rash or pruritis  The patient feels breathing is ok  No chest pain and no abdominal pain  The patient has been tough to get bloodwork from today, per nursing  Objective:     Vitals:   Temp (24hrs), Av 9 °F (36 6 °C), Min:96 1 °F (35 6 °C), Max:98 6 °F (37 °C)    HR:  [58-87] 60  Resp:  [16-20] 18  BP: (116-157)/(49-70) 157/68  SpO2:  [94 %-100 %] 95 %  Body mass index is 26 33 kg/m²  Input and Output Summary (last 24 hours): Intake/Output Summary (Last 24 hours) at 17 1302  Last data filed at 17 0948   Gross per 24 hour   Intake             1400 ml   Output             1530 ml   Net             -130 ml       Physical Exam:     Physical Exam   Constitutional: No distress  HENT:   Somewhat dry oral mucosa     Eyes: Conjunctivae are normal  Pupils are equal, round, and reactive to light  Cardiovascular: Normal rate and regular rhythm  No murmur heard  Pulmonary/Chest: She has no wheezes  She has no rales  Decreased breath sounds   Abdominal: Soft  Bowel sounds are normal  She exhibits no distension  There is no tenderness  PEG site intact  No erythema or drainage  Genitourinary:   Genitourinary Comments: Barker catheter - small amount of brownish urine   Musculoskeletal: She exhibits no edema  Neurological:   Patient stares when speaking to her  She has chronic upper extremity contractures  The patient does not follow commands, but just closes eyes again  Vitals reviewed  Additional Data:     Labs:      Results from last 7 days  Lab Units 11/17/17  1135   WBC Thousand/uL 19 32*   HEMOGLOBIN g/dL 6 6*   HEMATOCRIT % 22 0*   PLATELETS Thousands/uL 267   NEUTROS PCT % 72   LYMPHS PCT % 26   MONOS PCT % 2*   EOS PCT % 0       Results from last 7 days  Lab Units 11/17/17  0730 11/16/17  0520   SODIUM mmol/L 132* 140   POTASSIUM mmol/L 5 2 5 1   CHLORIDE mmol/L 93* 100   CO2 mmol/L 29 31   BUN mg/dL 48* 54*   CREATININE mg/dL 2 49* 3 49*   CALCIUM mg/dL 8 6 8 6   TOTAL PROTEIN g/dL  --  6 5   BILIRUBIN TOTAL mg/dL  --  0 77   ALK PHOS U/L  --  117*   ALT U/L  --  9*   AST U/L  --  46*   GLUCOSE RANDOM mg/dL 358* 149*       Results from last 7 days  Lab Units 11/15/17  0547   INR  1 41*       * I Have Reviewed All Lab Data Listed Above  * Additional Pertinent Lab Tests Reviewed: All Labs Within Last 24 Hours Reviewed    Imaging:    Imaging Reports Reviewed Today Include: CT chest / abdomen / pelvis  Imaging Personally Reviewed by Myself Includes:  None    Recent Cultures (last 7 days):       Results from last 7 days  Lab Units 11/17/17  2115 11/16/17  1004 11/16/17  1003 11/14/17  2230 11/14/17  1856 11/14/17  1727   BLOOD CULTURE   --  No Growth at 24 hrs  No Growth at 24 hrs   --   --  No Growth at 48 hrs     GRAM STAIN RESULT   --   --   -- --  No Polys or Bacteria seen  --    URINE CULTURE   --   --   --  No Growth <1000 cfu/mL  --   --    WOUND CULTURE   --   --   --   --  2+ Growth of Proteus mirabilis*  2+ Growth of Escherichia coli ESBL*  --    C DIFF TOXIN B  NEGATIVE for C difficle toxin by PCR    --   --   --   --   --        Last 24 Hours Medication List:     calcium acetate 667 mg Oral TID With Meals   carbidopa-levodopa 1 tablet Per PEG Tube TID   doxycycline hyclate 100 mg Per PEG Tube BID   guaiFENesin 200 mg Per PEG Tube Q6H   insulin glargine 15 Units Subcutaneous HS   insulin lispro 2-12 Units Subcutaneous Q6H   ipratropium 2 puff Inhalation TID   lacosamide 100 mg Per PEG Tube BID   levothyroxine 37 5 mcg Per PEG Tube HS   metoprolol tartrate 25 mg Per PEG Tube BID   miconazole  Topical BID   midodrine 5 mg Per PEG Tube TID   sodium hypochlorite 1 application Irrigation TID        Today, Patient Was Seen By: Letitia Parra DO    ** Please Note: This note has been constructed using a voice recognition system   **

## 2017-11-19 ENCOUNTER — APPOINTMENT (INPATIENT)
Dept: RADIOLOGY | Facility: HOSPITAL | Age: 77
DRG: 853 | End: 2017-11-19
Payer: MEDICARE

## 2017-11-19 ENCOUNTER — APPOINTMENT (INPATIENT)
Dept: DIALYSIS | Facility: HOSPITAL | Age: 77
DRG: 853 | End: 2017-11-19
Payer: MEDICARE

## 2017-11-19 ENCOUNTER — TELEPHONE (OUTPATIENT)
Dept: EMERGENCY DEPT | Facility: HOSPITAL | Age: 77
End: 2017-11-19

## 2017-11-19 PROBLEM — R82.81 PYURIA: Status: ACTIVE | Noted: 2017-11-19

## 2017-11-19 PROBLEM — R13.12 OROPHARYNGEAL DYSPHAGIA: Status: ACTIVE | Noted: 2017-11-19

## 2017-11-19 PROBLEM — E87.1 HYPONATREMIA: Status: ACTIVE | Noted: 2017-11-19

## 2017-11-19 PROBLEM — L89.154 DECUBITUS ULCER OF SACRAL REGION, STAGE 4 (HCC): Status: ACTIVE | Noted: 2017-11-19

## 2017-11-19 PROBLEM — G92.8 TOXIC METABOLIC ENCEPHALOPATHY: Status: ACTIVE | Noted: 2017-05-30

## 2017-11-19 LAB
ANION GAP SERPL CALCULATED.3IONS-SCNC: 11 MMOL/L (ref 4–13)
ARTERIAL PATENCY WRIST A: YES
BACTERIA WND AEROBE CULT: ABNORMAL
BASE EXCESS BLDA CALC-SCNC: 3.7 MMOL/L
BASOPHILS # BLD AUTO: 0.01 THOUSANDS/ΜL (ref 0–0.1)
BASOPHILS NFR BLD AUTO: 0 % (ref 0–1)
BUN SERPL-MCNC: 73 MG/DL (ref 5–25)
CALCIUM SERPL-MCNC: 9.3 MG/DL (ref 8.3–10.1)
CHLORIDE SERPL-SCNC: 89 MMOL/L (ref 100–108)
CO2 SERPL-SCNC: 26 MMOL/L (ref 21–32)
CREAT SERPL-MCNC: 2.63 MG/DL (ref 0.6–1.3)
EOSINOPHIL # BLD AUTO: 0.01 THOUSAND/ΜL (ref 0–0.61)
EOSINOPHIL NFR BLD AUTO: 0 % (ref 0–6)
ERYTHROCYTE [DISTWIDTH] IN BLOOD BY AUTOMATED COUNT: 17.1 % (ref 11.6–15.1)
GFR SERPL CREATININE-BSD FRML MDRD: 17 ML/MIN/1.73SQ M
GLUCOSE SERPL-MCNC: 135 MG/DL (ref 65–140)
GLUCOSE SERPL-MCNC: 215 MG/DL (ref 65–140)
GLUCOSE SERPL-MCNC: 238 MG/DL (ref 65–140)
GLUCOSE SERPL-MCNC: 285 MG/DL (ref 65–140)
GLUCOSE SERPL-MCNC: 286 MG/DL (ref 65–140)
GRAM STN SPEC: ABNORMAL
HCO3 BLDA-SCNC: 27.5 MMOL/L (ref 22–28)
HCT VFR BLD AUTO: 31.1 % (ref 34.8–46.1)
HGB BLD-MCNC: 9.7 G/DL (ref 11.5–15.4)
LYMPHOCYTES # BLD AUTO: 8.62 THOUSANDS/ΜL (ref 0.6–4.47)
LYMPHOCYTES NFR BLD AUTO: 34 % (ref 14–44)
MCH RBC QN AUTO: 27.2 PG (ref 26.8–34.3)
MCHC RBC AUTO-ENTMCNC: 31.2 G/DL (ref 31.4–37.4)
MCV RBC AUTO: 87 FL (ref 82–98)
MONOCYTES # BLD AUTO: 1.44 THOUSAND/ΜL (ref 0.17–1.22)
MONOCYTES NFR BLD AUTO: 6 % (ref 4–12)
NEUTROPHILS # BLD AUTO: 15.27 THOUSANDS/ΜL (ref 1.85–7.62)
NEUTS SEG NFR BLD AUTO: 60 % (ref 43–75)
NON VENT TYPE- NRB: 100
NRBC BLD AUTO-RTO: 0 /100 WBCS
O2 CT BLDA-SCNC: 15.4 ML/DL (ref 16–23)
OXYHGB MFR BLDA: 98.7 % (ref 94–97)
PCO2 BLDA: 38.8 MM HG (ref 36–44)
PH BLDA: 7.47 [PH] (ref 7.35–7.45)
PLATELET # BLD AUTO: 322 THOUSANDS/UL (ref 149–390)
PMV BLD AUTO: 9 FL (ref 8.9–12.7)
PO2 BLDA: 263.8 MM HG (ref 75–129)
POTASSIUM SERPL-SCNC: 3.8 MMOL/L (ref 3.5–5.3)
RBC # BLD AUTO: 3.57 MILLION/UL (ref 3.81–5.12)
SODIUM SERPL-SCNC: 126 MMOL/L (ref 136–145)
SPECIMEN SOURCE: ABNORMAL
WBC # BLD AUTO: 25.57 THOUSAND/UL (ref 4.31–10.16)

## 2017-11-19 PROCEDURE — 82948 REAGENT STRIP/BLOOD GLUCOSE: CPT

## 2017-11-19 PROCEDURE — 85025 COMPLETE CBC W/AUTO DIFF WBC: CPT | Performed by: INTERNAL MEDICINE

## 2017-11-19 PROCEDURE — 71010 HB CHEST X-RAY 1 VIEW FRONTAL (PORTABLE): CPT

## 2017-11-19 PROCEDURE — 82805 BLOOD GASES W/O2 SATURATION: CPT | Performed by: INTERNAL MEDICINE

## 2017-11-19 PROCEDURE — 36600 WITHDRAWAL OF ARTERIAL BLOOD: CPT

## 2017-11-19 PROCEDURE — 80048 BASIC METABOLIC PNL TOTAL CA: CPT | Performed by: INTERNAL MEDICINE

## 2017-11-19 RX ORDER — INSULIN GLARGINE 100 [IU]/ML
30 INJECTION, SOLUTION SUBCUTANEOUS
Status: DISCONTINUED | OUTPATIENT
Start: 2017-11-19 | End: 2017-11-20

## 2017-11-19 RX ADMIN — DOXYCYCLINE 100 MG: 100 CAPSULE ORAL at 23:48

## 2017-11-19 RX ADMIN — TRAMADOL HYDROCHLORIDE 25 MG: 50 TABLET, FILM COATED ORAL at 19:57

## 2017-11-19 RX ADMIN — DOXYCYCLINE 100 MG: 100 CAPSULE ORAL at 14:31

## 2017-11-19 RX ADMIN — MICONAZOLE NITRATE: 20 CREAM TOPICAL at 23:57

## 2017-11-19 RX ADMIN — Medication 100 MG: at 14:30

## 2017-11-19 RX ADMIN — INSULIN LISPRO 6 UNITS: 100 INJECTION, SOLUTION INTRAVENOUS; SUBCUTANEOUS at 05:42

## 2017-11-19 RX ADMIN — INSULIN LISPRO 6 UNITS: 100 INJECTION, SOLUTION INTRAVENOUS; SUBCUTANEOUS at 00:52

## 2017-11-19 RX ADMIN — MIDODRINE HYDROCHLORIDE 5 MG: 5 TABLET ORAL at 15:53

## 2017-11-19 RX ADMIN — INSULIN GLARGINE 30 UNITS: 100 INJECTION, SOLUTION SUBCUTANEOUS at 23:36

## 2017-11-19 RX ADMIN — INSULIN LISPRO 4 UNITS: 100 INJECTION, SOLUTION INTRAVENOUS; SUBCUTANEOUS at 23:37

## 2017-11-19 RX ADMIN — MIDODRINE HYDROCHLORIDE 5 MG: 5 TABLET ORAL at 23:48

## 2017-11-19 RX ADMIN — GUAIFENESIN 200 MG: 100 SOLUTION ORAL at 23:47

## 2017-11-19 RX ADMIN — INSULIN LISPRO 4 UNITS: 100 INJECTION, SOLUTION INTRAVENOUS; SUBCUTANEOUS at 18:47

## 2017-11-19 RX ADMIN — METOPROLOL TARTRATE 25 MG: 25 TABLET ORAL at 14:33

## 2017-11-19 RX ADMIN — GUAIFENESIN 200 MG: 100 SOLUTION ORAL at 02:52

## 2017-11-19 RX ADMIN — CARBIDOPA AND LEVODOPA 1 TABLET: 25; 100 TABLET ORAL at 15:53

## 2017-11-19 RX ADMIN — LEVOTHYROXINE SODIUM 37.5 MCG: 75 TABLET ORAL at 23:48

## 2017-11-19 RX ADMIN — CARBIDOPA AND LEVODOPA 1 TABLET: 25; 100 TABLET ORAL at 23:47

## 2017-11-19 RX ADMIN — MICONAZOLE NITRATE: 20 CREAM TOPICAL at 16:15

## 2017-11-19 RX ADMIN — IPRATROPIUM BROMIDE 2 PUFF: 17 AEROSOL, METERED RESPIRATORY (INHALATION) at 15:54

## 2017-11-19 RX ADMIN — GUAIFENESIN 200 MG: 100 SOLUTION ORAL at 14:31

## 2017-11-19 RX ADMIN — CEFEPIME HYDROCHLORIDE 1000 MG: 1 INJECTION, POWDER, FOR SOLUTION INTRAMUSCULAR; INTRAVENOUS at 16:13

## 2017-11-19 RX ADMIN — IPRATROPIUM BROMIDE 2 PUFF: 17 AEROSOL, METERED RESPIRATORY (INHALATION) at 23:56

## 2017-11-19 RX ADMIN — CALCIUM ACETATE 667 MG: 667 CAPSULE ORAL at 15:53

## 2017-11-19 NOTE — PROGRESS NOTES
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 ([facility])    Acute cystitis    Functional quadriplegia ([facility] 75 )      Plan:    · SIRS vs  Sepsis POA -   · Antibiotics - Cefepime started 11/16/2017 empirically  · Various potential sources considered on admission included ulcer infection, urinary infection, potential aspiration, other  With normal CT and negative urine culture, wound infection is the most likely source now  · Cultures -   · Follow up blood cultures from 11/16/2017  · CT negative for acute pathology  · Wound culture from ulcer shows ESBL E coli and proteus that is resistant to tetracycline but susceptible to cephalosporins  · C-diff toxin was negative  · Continue to monitor ulcer and monitor temps  Also get CBCD in am to trend WBC count which kat overnight a little  · Note: Per MAR and discussion [facility], patient is listed as being on doxycycline prehospital for diagnosis of "endocarditis"  Follow up blood culture results  · Chronic  Stage IV Sacral Decubitus Ulcer POA -   · Procedure - Bedside Incision and Drainage / Wound Debridement by Dr Gabby Huggins / Dr Jadyn Eden on 11/15/2017  · Was using a lidocaine based cream topically prehospital   Will discuss with ID whether we are ok to use or if this would encourage further infection? · Local Wound Care - BID dressing changes with betadine and Kerlix      · Asymptomatic Pyuria -    · On 11/15 when I spoke with the  he stated that patient still has a stent placed for "kidney blockage" in August  However urine culture was negative  Would suggest outpatient follow up with urologist     · Bleeding from AV Graft Site - Currently no bleeding reported on dialysis, despite the drop in H/H observed on 11/17/2017  · Anemia of Chronic Disease with possible Acute Blood Loss Anemia -   · At baseline, most hemoglobins are 7 0 - 8 0     · Transfused 1 unit PRBC on 11/17/2017 for Hemoglobin that was down to 6 6  · Also, as there has been no significant bleeding from the graft during this admission  · Stool was negative for blood  · CBCD mointoring  · ESRD on HD - Nephrology following  · Diabetes Mellitus Type 2 - Increase lantus to 30 units given hyperglycemia  Monitor blood glucose levels  · Acute Encephalopathy -   · Negative testing - TSH, B12, NH3  · Monitor and treat for infection (see sepsis above), the most likely cause of the encephalopathy  · Medication Review - sinemet, vimpat, PRN morphine are all potential contributors  These medications will need to be reviewed if patient not improving with treatment of infection  · Consider neurologist evaluation if symptoms persist   · Dysphagia - The patient is normally on tube feeding only (NPO)  On 11/15, restarted prehospital tube feeds of nepro at 50 ml/hr and FWF of 60 ml every 6 hours per my discussion with staff at [facility]  Aspiration precautions with HOB > 30 degrees at all times  · Hyponatremia - Will discuss with neurology  Correction with dialysis  Will recheck later today  · Level of Care - Currently Level 2 Stepdown  Will maintain level 2 stepdown  · Goals of Care - discussed this with  11/17/2017 who said that numerous times they have had this discussion about palliative care options  Patient has made up her mind that she has wanted to continue on with every aspect of medical care except for the fact that she wishes to be DNR    However after discussion with patient and , Jorge Alberto Abdi, on 11/18/2017, they changed from level 3 to level 2 DNR, feeling that intubation is ok  VTE Pharmacologic Prophylaxis:   Pharmacologic: Pharmacologic VTE Prophylaxis contraindicated due to recent bleeding  could consider starting soon if H/H remains stable and no bleeding  Mechanical VTE Prophylaxis in Place: Yes    Patient Centered Rounds: I have performed bedside rounds with nursing staff today  Discussions with Specialists or Other Care Team Provider: None today  Education and Discussions with Family / Patient: updated mitali Vaughn via phone  Main contact is mitali Vaughn at   Time Spent for Care: 30 minutes  More than 50% of total time spent on counseling and coordination of care as described above  Current Length of Stay: 4 day(s)    Current Patient Status: Inpatient   Certification Statement: The patient will continue to require additional inpatient hospital stay due to evaluations for the SIRS response, bleeding fistula, and pressure ulcer  Discharge Plan / Estimated Discharge Date: to be determined  Code Status: Level 2 - DNAR: but accepts endotracheal intubation      Subjective:   Rounded today at time when nursing is doing dressing change on patient's decubitus ulcer after her dialysis session earlier  The patient has some pain in the RLQ on exam which she says is new today  Can not describe the pain in more detail though  She has fecal incontinence, but no diarrhea per nursing  The patient has no other complaints today  With rotation there is some oxygen desaturation but patient, when asked if she feels short of breath states "a little bit"  The other day when this happened, we changed pulse oximeter to finger instead of forehead and it was better  May be equipment related      Objective:     Vitals:   Temp (24hrs), Av 8 °F (36 6 °C), Min:97 5 °F (36 4 °C), Max:98 3 °F (36 8 °C)    HR:  [60] 60  Resp:  [18-20] 20  BP: (110-160)/(51-70) 160/70  SpO2:  [93 %-100 %] 100 %  Body mass index is 26 33 kg/m²  Input and Output Summary (last 24 hours): Intake/Output Summary (Last 24 hours) at 11/19/17 0825  Last data filed at 11/18/17 1901   Gross per 24 hour   Intake              930 ml   Output               10 ml   Net              920 ml       Physical Exam:     Physical Exam   Constitutional: No distress  HENT:   Somewhat dry oral mucosa  Eyes: Conjunctivae are normal  Pupils are equal, round, and reactive to light  Cardiovascular: Normal rate and regular rhythm  No murmur heard  Pulmonary/Chest: She has no wheezes  She has no rales  Decreased breath sounds  Slight rales in right base as patient laying on right side during my visit  Abdominal: Soft  Bowel sounds are normal  She exhibits no distension  There is no tenderness  PEG site intact  No erythema or drainage  Genitourinary:   Genitourinary Comments: No jara catheter currently  Musculoskeletal: She exhibits no edema  Decubitus ulcer with some sloughed tissue centrally  Otherwise overall clean base without any significant drainage  Slight malodor at most    Neurological:   The patient is more awake and alert today  The patient answers simple questions appropriately  The patient follows commands as best as possible  Vitals reviewed        Additional Data:     Labs:      Results from last 7 days  Lab Units 11/19/17  0729   WBC Thousand/uL 25 57*   HEMOGLOBIN g/dL 9 7*   HEMATOCRIT % 31 1*   PLATELETS Thousands/uL 322   NEUTROS PCT % 60   LYMPHS PCT % 34   MONOS PCT % 6   EOS PCT % 0       Results from last 7 days  Lab Units 11/19/17  0729  11/16/17  0520   SODIUM mmol/L 126*  < > 140   POTASSIUM mmol/L 3 8  < > 5 1   CHLORIDE mmol/L 89*  < > 100   CO2 mmol/L 26  < > 31   BUN mg/dL 73*  < > 54*   CREATININE mg/dL 2 63*  < > 3 49*   CALCIUM mg/dL 9 3  < > 8 6   TOTAL PROTEIN g/dL  --   --  6 5   BILIRUBIN TOTAL mg/dL  --   --  0 77   ALK PHOS U/L  --   --  117*   ALT U/L  --   --  9*   AST U/L  --   --  46* GLUCOSE RANDOM mg/dL 215*  < > 149*   < > = values in this interval not displayed  Results from last 7 days  Lab Units 11/15/17  0547   INR  1 41*       * I Have Reviewed All Lab Data Listed Above  * Additional Pertinent Lab Tests Reviewed: All Labs Within Last 24 Hours Reviewed    Imaging:    Imaging Reports Reviewed Today Include: No new imaging  Imaging Personally Reviewed by Myself Includes:  None    Recent Cultures (last 7 days):       Results from last 7 days  Lab Units 11/17/17  2115 11/16/17  1004 11/16/17  1003 11/14/17  2230 11/14/17  1856 11/14/17  1727   BLOOD CULTURE   --  No Growth at 48 hrs  No Growth at 48 hrs   --   --  No Growth at 72 hrs  GRAM STAIN RESULT   --   --   --   --  No Polys or Bacteria seen  --    URINE CULTURE   --   --   --  No Growth <1000 cfu/mL  --   --    WOUND CULTURE   --   --   --   --  2+ Growth of Proteus mirabilis*  2+ Growth of Escherichia coli ESBL*  1+ Growth of Vancomycin Resistant Enterococcus faecalis*  Few Colonies of Pseudomonas aeruginosa*  --    C DIFF TOXIN B  NEGATIVE for C difficle toxin by PCR    --   --   --   --   --        Last 24 Hours Medication List:     calcium acetate 667 mg Oral TID With Meals   carbidopa-levodopa 1 tablet Per PEG Tube TID   cefepime 1,000 mg Intravenous Q24H   doxycycline hyclate 100 mg Per PEG Tube BID   guaiFENesin 200 mg Per PEG Tube Q6H   insulin glargine 25 Units Subcutaneous HS   insulin lispro 2-12 Units Subcutaneous Q6H   ipratropium 2 puff Inhalation TID   lacosamide 100 mg Per PEG Tube BID   levothyroxine 37 5 mcg Per PEG Tube HS   metoprolol tartrate 25 mg Per PEG Tube BID   miconazole  Topical BID   midodrine 5 mg Per PEG Tube TID   sodium hypochlorite 1 application Irrigation TID        Today, Patient Was Seen By: Sravanthi Morales DO    ** Please Note: This note has been constructed using a voice recognition system   **

## 2017-11-19 NOTE — PROGRESS NOTES
Pt desaturated to 59%  On 2L O2 NC, while changing and turned on side  Non-re breather placed on pt and sats increased to 100%  Pt repositioned, and 2L O2 NC placed back on pt  Sating %  Respiratory paged and asked to assess pt  Will continue to monitor

## 2017-11-19 NOTE — PROGRESS NOTES
Progress Note - Nephrology   Kelsey Lewis 68 y o  female MRN: [id number]  Unit/Bed#: [facility] 816-01 Encounter: [id number]      Assessment / Plan:  1  ESRD on HD TTS transitiioning to [facility] - ): seen by me on HD today (on holiday schedule inpatient, next HD Tuesday inpatient, then Friday)  -continue nephrocaps  2  Access: Hero AVG with recent bleeding at outpatient HD unit  Vascular consulted  Discussed with SLIM  No overt signs of infection  No further bleeding  3  Hypertension: on metoprolol 25mg po BID, on midodrine 5mg po TID for intradialytic hypotension  4  Anemia of CKD: monitor H&H, Hgb at goal on admission, Hgb 6 6--> 9 7, transfuse prn  No overt signs of bleeding from AVG  Not on SAIRA outpatient  5  Mineral and bone disease: monitor phos/PTH outpatient, on phoslo 1 tab TID with meals  6  Sacral decubitus ulcer (POA) - surgery follows  7  DM2 - on insulin per primary team  8  SIRS - no clear source of infection, no erythema overlying AVG, ID on board, f/u blood culture, on cefepime for now  Agree with ID  9   hyponatremia - correct on dialysis  ? Dilutional    10   High normal potassium-resolved  11  Recent endocarditis - per ID, on doxycycline         Subjective:   Patient seen examined on dialysis by me at approximately 11:00 a m     Blood pressure 111/49, blood flow 350 mL/minute, dialysis flow 700 mL/min, UF goal 1 5 L  Patient had overnight desaturation down to the 50s  She seems to be feeling better now  When asked how she is she tells me she feels better  She denies any chest pain or shortness of Breath  Tolerating dialysis well  Objective:     Vitals: Blood pressure (!) 105/46, pulse 60, temperature 97 5 °F (36 4 °C), temperature source Axillary, resp  rate 20, height 5' 2" (1 575 m), weight 65 3 kg (143 lb 15 4 oz), SpO2 100 %  ,Body mass index is 26 33 kg/m²  Temp (24hrs), Av 8 °F (36 6 °C), Min:97 5 °F (36 4 °C), Max:98 3 °F (36 8 °C)      Weight (last 2 days) Date/Time   Weight    11/17/17 0600  65 3 (143 96)                Intake/Output Summary (Last 24 hours) at 11/19/17 1227  Last data filed at 11/19/17 1001   Gross per 24 hour   Intake             1230 ml   Output                0 ml   Net             1230 ml     I/O last 24 hours: In: 1230 [I V :200;  Other:100; NG/GT:840; IV Piggyback:90]  Out: 10 [Urine:10]        Physical Exam:   Gen: NAD  Neck: supple  CV: +s1s2, no friction rub, +murmur  Pulm: clear anteriorly  Abdomen: soft, ND, NT, +PEG tube  Ext: no edema  Neuro: awake, alert  Skin: warm, dry    Invasive Devices     Peripheral Intravenous Line            Peripheral IV 11/17/17 Left Wrist 2 days    Peripheral IV 11/17/17 Left Forearm 1 day          Line            Hemodialysis AV Fistula Right Other (Comment) -- days          Drain            Gastrostomy/Enterostomy PEG-jejunostomy LUQ -- days                Medications:    Scheduled Meds:  calcium acetate 667 mg Oral TID With Meals   carbidopa-levodopa 1 tablet Per PEG Tube TID   cefepime 1,000 mg Intravenous Q24H   doxycycline hyclate 100 mg Per PEG Tube BID   guaiFENesin 200 mg Per PEG Tube Q6H   insulin glargine 25 Units Subcutaneous HS   insulin lispro 2-12 Units Subcutaneous Q6H   ipratropium 2 puff Inhalation TID   lacosamide 100 mg Per PEG Tube BID   levothyroxine 37 5 mcg Per PEG Tube HS   metoprolol tartrate 25 mg Per PEG Tube BID   miconazole  Topical BID   midodrine 5 mg Per PEG Tube TID   sodium hypochlorite 1 application Irrigation TID       PRN Meds:   acetaminophen    bisacodyl    ondansetron    traMADol    Continuous Infusions:         LAB RESULTS:        Results from last 7 days  Lab Units 11/19/17  0729 11/17/17  1135 11/17/17  0730 11/16/17  0520 11/15/17  0546 11/14/17  1727   WBC Thousand/uL 25 57* 19 32* 20 55*  --  17 20* 23 52*   HEMOGLOBIN g/dL 9 7* 6 6* 7 4*  --  7 1* 10 0*   HEMATOCRIT % 31 1* 22 0* 24 8*  --  25 1* 34 1*   PLATELETS Thousands/uL 322 267 282  --  308 430* NEUTROS PCT % 60 72 77*  --   --   --    LYMPHS PCT % 34 26 22  --   --   --    LYMPHO PCT %  --   --   --   --   --  21   MONOS PCT % 6 2* 1*  --   --   --    MONO PCT MAN %  --   --   --   --   --  5   EOS PCT % 0 0 0  --   --   --    EOSINO PCT MANUAL %  --   --   --   --   --  1   SODIUM mmol/L 126*  --  132* 140 141 144   POTASSIUM mmol/L 3 8  --  5 2 5 1 3 5 3 5   CHLORIDE mmol/L 89*  --  93* 100 100 98*   CO2 mmol/L 26  --  29 31 35* 36*   BUN mg/dL 73*  --  48* 54* 36* 24   CREATININE mg/dL 2 63*  --  2 49* 3 49* 2 34* 1 75*   CALCIUM mg/dL 9 3  --  8 6 8 6 8 5 8 8   ALBUMIN g/dL  --   --   --  2 5* 2 7* 2 8*   TOTAL PROTEIN g/dL  --   --   --  6 5 6 6 7 4   BILIRUBIN TOTAL mg/dL  --   --   --  0 77 0 72 0 50   ALK PHOS U/L  --   --   --  117* 115 147*   ALT U/L  --   --   --  9* 9* 9*   AST U/L  --   --   --  46* 24 19   GLUCOSE RANDOM mg/dL 215*  --  358* 149* 93 175*   MAGNESIUM mg/dL  --   --   --   --  2 4  --    PHOSPHORUS mg/dL  --   --   --   --  3 3  --        CUTURES:  Lab Results   Component Value Date    BLOODCX No Growth at 48 hrs  11/16/2017    BLOODCX No Growth at 48 hrs  11/16/2017    BLOODCX No Growth at 72 hrs  11/14/2017    BLOODCX No Growth After 5 Days  05/25/2017    BLOODCX No Growth After 5 Days  05/25/2017    BLOODCX Staphylococcus epidermidis 05/22/2017    BLOODCX Staphylococcus epidermidis 05/21/2017    URINECX No Growth <1000 cfu/mL 11/14/2017                 Portions of the record may have been created with voice recognition software  Occasional wrong word or "sound a like" substitutions may have occurred due to the inherent limitations of voice recognition software  Read the chart carefully and recognize, using context, where substitutions have occurred  If you have any questions, please contact the dictating provider

## 2017-11-19 NOTE — PROGRESS NOTES
Pt's sats dropping  Went as low as 66%  Dr Sienna Ordaz on floor - came in to see pt  Pt denies pain or shortness of breath  Placed non-rebreather mask on pt    resp therapist paged to do ABG's and CXR ordered

## 2017-11-19 NOTE — PROGRESS NOTES
Progress Note - Infectious Disease   Saman Magdaleno 68 y o  female MRN: [id number]  Unit/Bed#: [facility] 816-01 Encounter: [id number]      Impression/Recommendations:  1   SIRS versus sepsis  POA:  Leukocytosis and tachycardia  Evolving fever concerning for evolving infection  Blood cultures, C  Diff, CT C/A/P negative  Urine culture negative but was on antibiotics on admission (Doxycycline)  Sacral wound not acutely infected  Hemodynamically stable and non-toxic; Improved  Rec:       · Continue cefepime x3 more days  · Follow temperatures and white blood cell count closely  · Supportive care as per the primary service     2   Acute encephalopathy  Suspect multifactorial, ?toxic-metabolic  Suspect underlying baseline cognitive dysfunction  Markedly improved today, ?role of antibiotics  Rec:  · Continue antibiotics as above  · Follow mental status closely  · Continue supportive care as per the primary service     3   Chronic stage IV sacral decubitus ulcer  No evidence for acute purulence or cellulitis  Status post bedside debridement per surgery  Rec:       · Continue LWC and offloading as possible per surgery     4   ESRD on HD  Via RUE AVG     5   History of endocarditis  Recently treated at OSH  Rec:  · Continue doxycycline suppression  · Await outside hospital records     6  Disposition  Consider palliative care consultation for symptom management and possible consideration of comfort care    Discussed in detail with Dr Rosemary Hubbard     Antibiotics:  Cefepime #4  (Doxycycline)    Subjective:  Patient seen at HD  Remains awake and alert  Had an episode of desaturation early this morning, now resolved  Complains of pain in the sacral area  Denies fevers, chills, or sweats  Denies nausea, vomiting    Noted to be incontinent of diarrhea      Objective:  Vitals:  HR:  [60-64] 60  Resp:  [18-20] 18  BP: (105-166)/(45-70) 111/46  SpO2:  [93 %-100 %] 100 %  Temp (24hrs), Av 8 °F (36 6 °C), Min:97 5 °F (36 4 °C), Max:98 3 °F (36 8 °C)  Current: Temperature: 97 9 °F (36 6 °C)    Physical Exam:   General:  No acute distress  Eyes:  Normal lids and conjunctivae  ENT:  Normal external ears and nose  Neck:  Neck symmetric with midline trachea  Pulmonary:  Normal respiratory effort without accessory muscle use  Cardiovascular:  Regular rate and rhythm; no peripheral edema  Gastrointestinal:  No tenderness or distention, incontinent of liquid brown stool  Musculoskeletal:  No digital clubbing or cyanosis, upper extremity contractures  Skin:  No visible rashes; No palpable nodules  Neurologic:  Sensation grossly intact to light touch  Psychiatric:  Alert and oriented; flat affect    Lab Results:  I have personally reviewed pertinent labs  Results from last 7 days  Lab Units 11/19/17  0729 11/17/17  0730 11/16/17  0520 11/15/17  0546 11/14/17  1727   SODIUM mmol/L 126* 132* 140 141 144   POTASSIUM mmol/L 3 8 5 2 5 1 3 5 3 5   CHLORIDE mmol/L 89* 93* 100 100 98*   CO2 mmol/L 26 29 31 35* 36*   ANION GAP mmol/L 11 10 9 6 10   BUN mg/dL 73* 48* 54* 36* 24   CREATININE mg/dL 2 63* 2 49* 3 49* 2 34* 1 75*   EGFR ml/min/1 73sq m 17 18 12 19 28   GLUCOSE RANDOM mg/dL 215* 358* 149* 93 175*   CALCIUM mg/dL 9 3 8 6 8 6 8 5 8 8   AST U/L  --   --  46* 24 19   ALT U/L  --   --  9* 9* 9*   ALK PHOS U/L  --   --  117* 115 147*   TOTAL PROTEIN g/dL  --   --  6 5 6 6 7 4   ALBUMIN g/dL  --   --  2 5* 2 7* 2 8*   BILIRUBIN TOTAL mg/dL  --   --  0 77 0 72 0 50       Results from last 7 days  Lab Units 11/19/17  0729 11/17/17  1135 11/17/17  0730   WBC Thousand/uL 25 57* 19 32* 20 55*   HEMOGLOBIN g/dL 9 7* 6 6* 7 4*   PLATELETS Thousands/uL 322 267 282       Results from last 7 days  Lab Units 11/17/17  2115 11/16/17  1004 11/16/17  1003 11/15/17  0150 11/14/17  2230 11/14/17  1856 11/14/17  1727   BLOOD CULTURE   --  No Growth at 48 hrs  No Growth at 48 hrs   --   --   --  No Growth at 72 hrs     GRAM STAIN RESULT   --   --   --   --   --  No Polys or Bacteria seen  --    URINE CULTURE   --   --   --   --  No Growth <1000 cfu/mL  --   --    WOUND CULTURE   --   --   --   --   --  2+ Growth of Proteus mirabilis*  2+ Growth of Escherichia coli ESBL*  1+ Growth of Vancomycin Resistant Enterococcus faecalis*  Few Colonies of Pseudomonas aeruginosa*  --    MRSA CULTURE ONLY   --   --   --  No Methicillin Resistant Staphlyococcus aureus (MRSA) isolated  --   --   --    C DIFF TOXIN B  NEGATIVE for C difficle toxin by PCR    --   --   --   --   --   --        Imaging Studies:   I have personally reviewed pertinent imaging study reports and images in PACS  CT C/A/P 11/18 no acute chest or abdominal infection    EKG, Pathology, and Other Studies:   I have personally reviewed pertinent reports

## 2017-11-20 LAB
ANION GAP SERPL CALCULATED.3IONS-SCNC: 9 MMOL/L (ref 4–13)
ANISOCYTOSIS BLD QL SMEAR: PRESENT
BACTERIA BLD CULT: NORMAL
BASOPHILS # BLD MANUAL: 0 THOUSAND/UL (ref 0–0.1)
BASOPHILS NFR MAR MANUAL: 0 % (ref 0–1)
BUN SERPL-MCNC: 37 MG/DL (ref 5–25)
CALCIUM SERPL-MCNC: 8.8 MG/DL (ref 8.3–10.1)
CHLORIDE SERPL-SCNC: 92 MMOL/L (ref 100–108)
CO2 SERPL-SCNC: 30 MMOL/L (ref 21–32)
CREAT SERPL-MCNC: 1.76 MG/DL (ref 0.6–1.3)
EOSINOPHIL # BLD MANUAL: 0.79 THOUSAND/UL (ref 0–0.4)
EOSINOPHIL NFR BLD MANUAL: 3 % (ref 0–6)
ERYTHROCYTE [DISTWIDTH] IN BLOOD BY AUTOMATED COUNT: 17.4 % (ref 11.6–15.1)
GFR SERPL CREATININE-BSD FRML MDRD: 28 ML/MIN/1.73SQ M
GLUCOSE SERPL-MCNC: 161 MG/DL (ref 65–140)
GLUCOSE SERPL-MCNC: 164 MG/DL (ref 65–140)
GLUCOSE SERPL-MCNC: 175 MG/DL (ref 65–140)
GLUCOSE SERPL-MCNC: 233 MG/DL (ref 65–140)
GLUCOSE SERPL-MCNC: 249 MG/DL (ref 65–140)
GLUCOSE SERPL-MCNC: 290 MG/DL (ref 65–140)
HCT VFR BLD AUTO: 29.8 % (ref 34.8–46.1)
HGB BLD-MCNC: 9.4 G/DL (ref 11.5–15.4)
LYMPHOCYTES # BLD AUTO: 24 % (ref 14–44)
LYMPHOCYTES # BLD AUTO: 6.35 THOUSAND/UL (ref 0.6–4.47)
MCH RBC QN AUTO: 27.8 PG (ref 26.8–34.3)
MCHC RBC AUTO-ENTMCNC: 31.5 G/DL (ref 31.4–37.4)
MCV RBC AUTO: 88 FL (ref 82–98)
MONOCYTES # BLD AUTO: 0.53 THOUSAND/UL (ref 0–1.22)
MONOCYTES NFR BLD: 2 % (ref 4–12)
MYELOCYTES NFR BLD MANUAL: 1 % (ref 0–1)
NEUTROPHILS # BLD MANUAL: 18.52 THOUSAND/UL (ref 1.85–7.62)
NEUTS SEG NFR BLD AUTO: 70 % (ref 43–75)
NRBC BLD AUTO-RTO: 0 /100 WBCS
PLATELET # BLD AUTO: 271 THOUSANDS/UL (ref 149–390)
PLATELET BLD QL SMEAR: ADEQUATE
PMV BLD AUTO: 9.1 FL (ref 8.9–12.7)
POIKILOCYTOSIS BLD QL SMEAR: PRESENT
POTASSIUM SERPL-SCNC: 3.5 MMOL/L (ref 3.5–5.3)
RBC # BLD AUTO: 3.38 MILLION/UL (ref 3.81–5.12)
RBC MORPH BLD: PRESENT
SODIUM SERPL-SCNC: 131 MMOL/L (ref 136–145)
STOMATOCYTES BLD QL SMEAR: PRESENT
WBC # BLD AUTO: 26.45 THOUSAND/UL (ref 4.31–10.16)

## 2017-11-20 PROCEDURE — 85007 BL SMEAR W/DIFF WBC COUNT: CPT | Performed by: INTERNAL MEDICINE

## 2017-11-20 PROCEDURE — 94762 N-INVAS EAR/PLS OXIMTRY CONT: CPT

## 2017-11-20 PROCEDURE — 85027 COMPLETE CBC AUTOMATED: CPT | Performed by: INTERNAL MEDICINE

## 2017-11-20 PROCEDURE — 80048 BASIC METABOLIC PNL TOTAL CA: CPT | Performed by: INTERNAL MEDICINE

## 2017-11-20 PROCEDURE — 82948 REAGENT STRIP/BLOOD GLUCOSE: CPT

## 2017-11-20 RX ORDER — INSULIN GLARGINE 100 [IU]/ML
38 INJECTION, SOLUTION SUBCUTANEOUS
Status: DISCONTINUED | OUTPATIENT
Start: 2017-11-20 | End: 2017-11-22 | Stop reason: HOSPADM

## 2017-11-20 RX ADMIN — INSULIN GLARGINE 38 UNITS: 100 INJECTION, SOLUTION SUBCUTANEOUS at 21:37

## 2017-11-20 RX ADMIN — INSULIN LISPRO 6 UNITS: 100 INJECTION, SOLUTION INTRAVENOUS; SUBCUTANEOUS at 23:57

## 2017-11-20 RX ADMIN — Medication 100 MG: at 09:01

## 2017-11-20 RX ADMIN — MIDODRINE HYDROCHLORIDE 5 MG: 5 TABLET ORAL at 09:01

## 2017-11-20 RX ADMIN — METOPROLOL TARTRATE 25 MG: 25 TABLET ORAL at 09:02

## 2017-11-20 RX ADMIN — MIDODRINE HYDROCHLORIDE 5 MG: 5 TABLET ORAL at 20:38

## 2017-11-20 RX ADMIN — INSULIN LISPRO 2 UNITS: 100 INJECTION, SOLUTION INTRAVENOUS; SUBCUTANEOUS at 18:01

## 2017-11-20 RX ADMIN — METOPROLOL TARTRATE 25 MG: 25 TABLET ORAL at 17:47

## 2017-11-20 RX ADMIN — GUAIFENESIN 200 MG: 100 SOLUTION ORAL at 09:02

## 2017-11-20 RX ADMIN — Medication 100 MG: at 00:20

## 2017-11-20 RX ADMIN — HYOSCYAMINE SULFATE 1 APPLICATION: 16 SOLUTION at 00:22

## 2017-11-20 RX ADMIN — CALCIUM ACETATE 667 MG: 667 CAPSULE ORAL at 16:25

## 2017-11-20 RX ADMIN — IPRATROPIUM BROMIDE 2 PUFF: 17 AEROSOL, METERED RESPIRATORY (INHALATION) at 11:03

## 2017-11-20 RX ADMIN — GUAIFENESIN 200 MG: 100 SOLUTION ORAL at 20:38

## 2017-11-20 RX ADMIN — CALCIUM ACETATE 667 MG: 667 CAPSULE ORAL at 12:15

## 2017-11-20 RX ADMIN — CEFEPIME HYDROCHLORIDE 1000 MG: 1 INJECTION, POWDER, FOR SOLUTION INTRAMUSCULAR; INTRAVENOUS at 16:38

## 2017-11-20 RX ADMIN — IPRATROPIUM BROMIDE 2 PUFF: 17 AEROSOL, METERED RESPIRATORY (INHALATION) at 16:26

## 2017-11-20 RX ADMIN — INSULIN LISPRO 4 UNITS: 100 INJECTION, SOLUTION INTRAVENOUS; SUBCUTANEOUS at 12:12

## 2017-11-20 RX ADMIN — MICONAZOLE NITRATE: 20 CREAM TOPICAL at 17:50

## 2017-11-20 RX ADMIN — IPRATROPIUM BROMIDE 2 PUFF: 17 AEROSOL, METERED RESPIRATORY (INHALATION) at 20:38

## 2017-11-20 RX ADMIN — DOXYCYCLINE 100 MG: 100 CAPSULE ORAL at 17:47

## 2017-11-20 RX ADMIN — Medication 100 MG: at 17:50

## 2017-11-20 RX ADMIN — CARBIDOPA AND LEVODOPA 1 TABLET: 25; 100 TABLET ORAL at 09:02

## 2017-11-20 RX ADMIN — LEVOTHYROXINE SODIUM 37.5 MCG: 75 TABLET ORAL at 21:36

## 2017-11-20 RX ADMIN — CALCIUM ACETATE 667 MG: 667 CAPSULE ORAL at 09:02

## 2017-11-20 RX ADMIN — MIDODRINE HYDROCHLORIDE 5 MG: 5 TABLET ORAL at 16:25

## 2017-11-20 RX ADMIN — CARBIDOPA AND LEVODOPA 1 TABLET: 25; 100 TABLET ORAL at 20:38

## 2017-11-20 RX ADMIN — DOXYCYCLINE 100 MG: 100 CAPSULE ORAL at 09:02

## 2017-11-20 RX ADMIN — MICONAZOLE NITRATE: 20 CREAM TOPICAL at 12:13

## 2017-11-20 RX ADMIN — INSULIN LISPRO 2 UNITS: 100 INJECTION, SOLUTION INTRAVENOUS; SUBCUTANEOUS at 06:16

## 2017-11-20 RX ADMIN — CARBIDOPA AND LEVODOPA 1 TABLET: 25; 100 TABLET ORAL at 16:25

## 2017-11-20 NOTE — PROGRESS NOTES
NEPHROLOGY PROGRESS NOTE   Aron  68 y o  female MRN: [id number]  Unit/Bed#: [facility] 127-28 Encounter: [id number]  Reason for Consult: ESRD    ASSESSMENT and PLAN:    42-year-old female with end-stage renal disease on hemodialysis admitted on November 15th bleeding from AV fistula site  Bleeding has since resolved and she is being treated for stage IV decubitus ulcer with possible infection  1 )  End-stage renal disease on hemodialysis  - transitioned from a TTS to a Monday Wednesday Friday schedule  - [facility]  - access:  HeRo Graft, no bleeding, no issues  - plan for next dialysis treatment tomorrow November 21st    2 )  Hypertension  - blood pressure acceptable    3 )  Anemia of chronic kidney disease  - hemoglobin close to goal    4 )  Hyponatremia  - monitor with dialysis  - fluid restriction    5 )  Leukocytosis  - impaired can a biotic says per Infectious Disease    SUBJECTIVE / INTERVAL HISTORY:  No overnight events  Underwent dialysis yesterday      OBJECTIVE:  Current Weight: Weight - Scale: 65 3 kg (143 lb 15 4 oz)  Vitals:    11/19/17 2300 11/19/17 2348 11/20/17 0300 11/20/17 0701   BP: 101/52 100/60 132/60 127/60   Pulse: 60 60 60 60   Resp: 18  18 16   Temp: 98 °F (36 7 °C)  97 7 °F (36 5 °C) 97 5 °F (36 4 °C)   TempSrc: Oral  Oral Oral   SpO2: 93%  95% 99%   Weight:       Height:           Intake/Output Summary (Last 24 hours) at 11/20/17 1037  Last data filed at 11/19/17 1900   Gross per 24 hour   Intake             1270 ml   Output             1500 ml   Net             -230 ml     General:  No acute distress  HEENT:  Dry and pale  Neck:  Supple  Skin:  Poor turgor  Cardiovascular:  S1-S2 appreciated  Lungs:  Clear  Abdomen:  Nontender  Extremities:  No edema  Neurologic:  Lethargic  Access:  Hero avg    Medications:    Current Facility-Administered Medications:     acetaminophen (TYLENOL) tablet 650 mg, 650 mg, Per PEG Tube, Q4H PRN, Nick Baltazar MD, 650 mg at 11/18/17 1133    bisacodyl (DULCOLAX) rectal suppository 10 mg, 10 mg, Rectal, PRN, José Luis Licea MD    calcium acetate (PHOSLO) capsule 667 mg, 667 mg, Oral, TID With Meals, Maria Del Rosario Mayen, DO, 667 mg at 11/20/17 0902    carbidopa-levodopa (SINEMET)  mg per tablet 1 tablet, 1 tablet, Per PEG Tube, TID, José Luis Lieca MD, 1 tablet at 11/20/17 0902    cefepime (MAXIPIME) 1,000 mg in dextrose 5 % 50 mL IVPB, 1,000 mg, Intravenous, Q24H, Molly Watson MD, Stopped at 11/19/17 1700    doxycycline hyclate (VIBRAMYCIN) capsule 100 mg, 100 mg, Per PEG Tube, BID, José Luis Licea MD, 100 mg at 11/20/17 0902    guaiFENesin (ROBITUSSIN) oral solution 200 mg, 200 mg, Per PEG Tube, Q6H, Laurel Bordenis, DO, 200 mg at 11/20/17 0902    insulin glargine (LANTUS) subcutaneous injection 30 Units, 30 Units, Subcutaneous, HS, Laurel Bordenis, DO, 30 Units at 11/19/17 2336    insulin lispro (HumaLOG) 100 units/mL subcutaneous injection 2-12 Units, 2-12 Units, Subcutaneous, Q6H, Laurel Bordenis, DO, 2 Units at 11/20/17 0616    ipratropium (ATROVENT HFA) inhaler 2 puff, 2 puff, Inhalation, TID, Evelyn Nichole MD, 2 puff at 11/19/17 2356    lacosamide (VIMPAT) 10 mg/mL oral solution 100 mg, 100 mg, Per PEG Tube, BID, José Luis Licea MD, 100 mg at 11/20/17 0901    levothyroxine tablet 37 5 mcg, 37 5 mcg, Per PEG Tube, HS, José Luis Licea MD, 37 5 mcg at 11/19/17 2348    metoprolol tartrate (LOPRESSOR) tablet 25 mg, 25 mg, Per PEG Tube, BID, José Luis Licea MD, 25 mg at 11/20/17 0902    miconazole 2 % cream, , Topical, BID, José Luis Licea MD    midodrine (PROAMATINE) tablet 5 mg, 5 mg, Per PEG Tube, TID, José Luis Licea MD, 5 mg at 11/20/17 0901    ondansetron (ZOFRAN) injection 4 mg, 4 mg, Intravenous, Q6H PRN, José Luis Licea MD    sodium hypochlorite (HYSEPT) 0 25 % topical solution 1 application, 1 application, Irrigation, TID, José Luis Licea MD, 1 application at 78/69/76 0022    traMADol Marie Toussaint) tablet 25 mg, 25 mg, Per G Tube, Q6H PRN, Liz Serrato DO, 25 mg at 11/19/17 1957    Laboratory Results:    Results from last 7 days  Lab Units 11/20/17  0616 11/19/17  0729 11/17/17  1135 11/17/17  0730 11/16/17  0520 11/15/17  0546 11/14/17  1727   WBC Thousand/uL 26 45* 25 57* 19 32* 20 55*  --  17 20* 23 52*   HEMOGLOBIN g/dL 9 4* 9 7* 6 6* 7 4*  --  7 1* 10 0*   HEMATOCRIT % 29 8* 31 1* 22 0* 24 8*  --  25 1* 34 1*   PLATELETS Thousands/uL 271 322 267 282  --  308 430*   SODIUM mmol/L 131* 126*  --  132* 140 141 144   POTASSIUM mmol/L 3 5 3 8  --  5 2 5 1 3 5 3 5   CHLORIDE mmol/L 92* 89*  --  93* 100 100 98*   CO2 mmol/L 30 26  --  29 31 35* 36*   BUN mg/dL 37* 73*  --  48* 54* 36* 24   CREATININE mg/dL 1 76* 2 63*  --  2 49* 3 49* 2 34* 1 75*   CALCIUM mg/dL 8 8 9 3  --  8 6 8 6 8 5 8 8   MAGNESIUM mg/dL  --   --   --   --   --  2 4  --    PHOSPHORUS mg/dL  --   --   --   --   --  3 3  --    ALBUMIN g/dL  --   --   --   --  2 5* 2 7* 2 8*   TOTAL PROTEIN g/dL  --   --   --   --  6 5 6 6 7 4   GLUCOSE RANDOM mg/dL 164* 215*  --  358* 149* 93 175*

## 2017-11-20 NOTE — PROGRESS NOTES
[facility] Progress Note  Patient: Naresh Ortiz 68 y o  female   MRN: [id number]  PCP: Karon Hernández DO  Unit/Bed#: [facility] 484-54 Encounter: [id number]  Date Of Visit: 11/20/17    Assessment:    Principal Problem:    Pressure ulcer of left buttock, unstageable ([facility])  Active Problems:    Anemia    Sepsis ([address] )    Type 2 diabetes mellitus with renal manifestations ([facility])    ESRD (end stage renal disease) on dialysis ([facility])    Chronic diastolic congestive heart failure ([facility])    Toxic metabolic encephalopathy    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 ([facility])    Functional quadriplegia ([facility])    Decubitus ulcer of sacral region, stage 4 ([facility])    Pyuria    Oropharyngeal dysphagia    Hyponatremia      Plan:    · SIRS vs  Sepsis POA -   · Antibiotics - Cefepime started 11/16/2017 empirically  ID plans to use this for 2 more days  · Various potential sources considered on admission included ulcer infection, urinary infection, potential aspiration, other  With normal CT and negative urine culture, wound infection is the most likely source now  · Cultures -   · Follow up final results for blood cultures from 11/16/2017  · CT negative for acute pathology  · Wound culture from ulcer shows ESBL E coli and proteus that is resistant to tetracycline but susceptible to cephalosporins  · C-diff toxin was negative  · Continue to monitor ulcer and monitor temps  Also get CBCD in am to trend WBC count which kat overnight a little  · Note: Per MAR and discussion with staff from [facility], patient is listed as being on doxycycline prehospital for diagnosis of "endocarditis"  Follow up blood culture results  · Chronic  Stage IV Sacral Decubitus Ulcer POA -   · Procedure - Bedside Incision and Drainage / Wound Debridement by Dr Melanie Davies / Dr Obie Ramos on 11/15/2017    · Was using a lidocaine based cream topically prehospital   However we have been holding off on this as this will make ulcer site more moist / bacteria prone  · Surgery will take another look at ulcer today  · Local Wound Care - BID dressing changes with betadine and Kerlix  · Asymptomatic Pyuria -    · On 11/15 when I spoke with the  he stated that patient still has a stent placed for "kidney blockage" in August   However urine culture was negative  Would suggest outpatient follow up with urologist     · Bleeding from AV Graft Site - Currently no bleeding reported on dialysis, despite the drop in H/H observed on 11/17/2017  · Anemia of Chronic Disease with possible Acute Blood Loss Anemia -   · At baseline, most hemoglobins are 7 0 - 8 0     · Transfused 1 unit PRBC on 11/17/2017 for Hemoglobin that was down to 6 6  · Also, as there has been no significant bleeding from the graft during this admission  · Stool was negative for blood  · Intermittent CBC checks  · ESRD on HD - Nephrology following  · Diabetes Mellitus Type 2 - Increase lantus to 38 units given hyperglycemia  Monitor blood glucose levels  · Acute Encephalopathy -   · Negative testing - TSH, B12, NH3  · Monitor and treat for infection (see sepsis above), the most likely cause of the encephalopathy  · Medication Review - sinemet, vimpat, PRN morphine are all potential contributors  These medications will need to be reviewed if patient not improving with treatment of infection  · Consider neurologist evaluation if symptoms persist   · Since she had her "best day" when being loaded with steroids, we will check cortisol level to rule out adrenal insufficiency  · Dysphagia - Prehospital condition (not new)  The patient is normally on tube feeding only (NPO)  On 11/15, restarted prehospital tube feeds of nepro at 50 ml/hr and FWF of 60 ml every 6 hours per my discussion with staff at [facility]  Aspiration precautions with HOB > 30 degrees at all times    · Pulmonary Edema - nephrology will need to manage with dialysis as patient hardly makes any urine  There were very low O2 sats noted on 11/19/2017, but determined to be related to equipment  · Hyponatremia - Managing with dialysis  Care deferred to nephrology  · Level of Care - Currently Level 2 Stepdown  Will maintain level 2 stepdown  Patient requiring a lot of nursing care  · Goals of Care - discussed this with  11/17/2017 who said that numerous times they have had this discussion about palliative care options  Patient has made up her mind that she has wanted to continue on with every aspect of medical care except for the fact that she wishes to be DNR  However after discussion with patient and , Abisai Howard, on 11/18/2017, they changed from level 3 to level 2 DNR, feeling that intubation is ok  VTE Pharmacologic Prophylaxis:   Pharmacologic: Pharmacologic VTE Prophylaxis contraindicated due to recent bleeding  could consider starting soon if H/H remains stable and no bleeding  Mechanical VTE Prophylaxis in Place: Yes    Patient Centered Rounds: I have performed bedside rounds with nursing staff today  Discussions with Specialists or Other Care Team Provider: Shade Gould  Education and Discussions with Family / Patient: updated  at bedside  Main contact is  Abisai Howard at   Time Spent for Care: 30 minutes  More than 50% of total time spent on counseling and coordination of care as described above  Current Length of Stay: 5 day(s)    Current Patient Status: Inpatient   Certification Statement: The patient will continue to require additional inpatient hospital stay due to evaluations for the SIRS response, bleeding fistula, and pressure ulcer  Discharge Plan / Estimated Discharge Date: no discharge before Wednesday or Thursday  Code Status: Level 2 - DNAR: but accepts endotracheal intubation      Subjective: The patient was talking with  more earlier but last hour or so has been more sleepy    Mentally, she is not typically very talkative but he states that last few days she is at her baseline  She was really good a few days ago when she had dialysis , was on a steroid prep, getting blood, and nutrition started up  Objective:     Vitals:   Temp (24hrs), Av 8 °F (36 6 °C), Min:97 5 °F (36 4 °C), Max:98 °F (36 7 °C)    HR:  [60] 60  Resp:  [15-18] 16  BP: (100-132)/(45-60) 127/60  SpO2:  [93 %-100 %] 99 %  Body mass index is 26 33 kg/m²  Input and Output Summary (last 24 hours): Intake/Output Summary (Last 24 hours) at 17 0908  Last data filed at 17 1900   Gross per 24 hour   Intake             1370 ml   Output             1500 ml   Net             -130 ml       Physical Exam:     Physical Exam   Constitutional: No distress  HENT:   slightly dry oral mucosa  Eyes: Conjunctivae are normal  Pupils are equal, round, and reactive to light  Cardiovascular: Normal rate and regular rhythm  Murmur (systolic) heard  Pulmonary/Chest: She has no wheezes  Decreased breath sounds  Slight rales persist in the  bases  Abdominal: Soft  Bowel sounds are normal  She exhibits no distension  There is no tenderness  PEG site intact  No erythema or drainage  Musculoskeletal: She exhibits no edema  Neurological:   The patient is sleepy again today when I see her  She does nod yes and no to questions about condition  She takes deep breaths when asked to do so  She is quadriplegic  Vitals reviewed      Additional Data:     Labs:      Results from last 7 days  Lab Units 17  0616 17  0729   WBC Thousand/uL 26 45* 25 57*   HEMOGLOBIN g/dL 9 4* 9 7*   HEMATOCRIT % 29 8* 31 1*   PLATELETS Thousands/uL 271 322   NEUTROS PCT %  --  60   LYMPHS PCT %  --  34   MONOS PCT %  --  6   EOS PCT %  --  0       Results from last 7 days  Lab Units 17  0616  17  0520   SODIUM mmol/L 131*  < > 140   POTASSIUM mmol/L 3 5  < > 5 1   CHLORIDE mmol/L 92*  < > 100   CO2 mmol/L 30  < > 31   BUN mg/dL 37*  < > 54*   CREATININE mg/dL 1 76*  < > 3 49*   CALCIUM mg/dL 8 8  < > 8 6   TOTAL PROTEIN g/dL  --   --  6 5   BILIRUBIN TOTAL mg/dL  --   --  0 77   ALK PHOS U/L  --   --  117*   ALT U/L  --   --  9*   AST U/L  --   --  46*   GLUCOSE RANDOM mg/dL 164*  < > 149*   < > = values in this interval not displayed  Results from last 7 days  Lab Units 11/15/17  0547   INR  1 41*       * I Have Reviewed All Lab Data Listed Above  * Additional Pertinent Lab Tests Reviewed: All Labs Within Last 24 Hours Reviewed    Imaging:    Imaging Reports Reviewed Today Include: No new imaging  Imaging Personally Reviewed by Myself Includes:  None    Recent Cultures (last 7 days):       Results from last 7 days  Lab Units 11/17/17  2115 11/16/17  1004 11/16/17  1003 11/14/17  2230 11/14/17  1856 11/14/17  1727   BLOOD CULTURE   --  No Growth at 72 hrs  No Growth at 72 hrs   --   --  No Growth After 4 Days     GRAM STAIN RESULT   --   --   --   --  No Polys or Bacteria seen  --    URINE CULTURE   --   --   --  No Growth <1000 cfu/mL  --   --    WOUND CULTURE   --   --   --   --  2+ Growth of Proteus mirabilis*  2+ Growth of Escherichia coli ESBL*  1+ Growth of Vancomycin Resistant Enterococcus faecalis*  Few Colonies of Pseudomonas aeruginosa*  --    C DIFF TOXIN B  NEGATIVE for C difficle toxin by PCR    --   --   --   --   --        Last 24 Hours Medication List:     calcium acetate 667 mg Oral TID With Meals   carbidopa-levodopa 1 tablet Per PEG Tube TID   cefepime 1,000 mg Intravenous Q24H   doxycycline hyclate 100 mg Per PEG Tube BID   guaiFENesin 200 mg Per PEG Tube Q6H   insulin glargine 30 Units Subcutaneous HS   insulin lispro 2-12 Units Subcutaneous Q6H   ipratropium 2 puff Inhalation TID   lacosamide 100 mg Per PEG Tube BID   levothyroxine 37 5 mcg Per PEG Tube HS   metoprolol tartrate 25 mg Per PEG Tube BID   miconazole  Topical BID   midodrine 5 mg Per PEG Tube TID   sodium hypochlorite 1 application Irrigation TID Today, Patient Was Seen By: Ileana Mahmood DO    ** Please Note: This note has been constructed using a voice recognition system   **

## 2017-11-20 NOTE — CASE MANAGEMENT
Continued Stay Review    Date: 11/19/17    Vital Signs: /58   Pulse 60   Temp 97 9 °F (36 6 °C) (Oral)   Resp 18   Ht 5' 2" (1 575 m)   Wt 65 3 kg (143 lb 15 4 oz)   LMP  (LMP Unknown)   SpO2 97%   BMI 26 33 kg/m²     Medications:   Scheduled Meds:   calcium acetate 667 mg Oral TID With Meals   carbidopa-levodopa 1 tablet Per PEG Tube TID   cefepime 1,000 mg Intravenous Q24H   doxycycline hyclate 100 mg Per PEG Tube BID   guaiFENesin 200 mg Per PEG Tube Q6H   insulin glargine 30 Units Subcutaneous HS   insulin lispro 2-12 Units Subcutaneous Q6H   ipratropium 2 puff Inhalation TID   lacosamide 100 mg Per PEG Tube BID   levothyroxine 37 5 mcg Per PEG Tube HS   metoprolol tartrate 25 mg Per PEG Tube BID   miconazole  Topical BID   midodrine 5 mg Per PEG Tube TID   sodium hypochlorite 1 application Irrigation TID     Continuous Infusions:    PRN Meds:   acetaminophen    bisacodyl    ondansetron    traMADol    Abnormal Labs/Diagnostic Results:   WBC 25 57*   HEMOGLOBIN 9 7*   HEMATOCRIT 31 1*     SODIUM 126* 140   CHLORIDE 89* 100   BUN 73* 54*   CREATININE 2 63*      GLUCOSE RANDOM 215*     Age/Sex: 68 y o  female     Assessment:     Principal Problem:    Pressure ulcer of left buttock, unstageable ([facility])  Active Problems:    Type 2 diabetes mellitus with renal manifestations ([facility])    ESRD (end stage renal disease) on dialysis ([facility])    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 Antibiotics - Cefepime started 11/16/2017 empirically  ? Various potential sources considered on admission included ulcer infection, urinary infection, potential aspiration, other  With normal CT and negative urine culture, wound infection is the most likely source now  ? Cultures -   ? Follow up blood cultures from 11/16/2017   ? CT negative for acute pathology  ?  Wound culture from ulcer shows ESBL E coli and proteus that is resistant to tetracycline but susceptible to cephalosporins  ? C-diff toxin was negative  ? Continue to monitor ulcer and monitor temps  Also get CBCD in am to trend WBC count which kat overnight a little  ? Note: Per MAR and discussion with staff from [facility], patient is listed as being on doxycycline prehospital for diagnosis of "endocarditis"  Follow up blood culture results  · Chronic  Stage IV Sacral Decubitus Ulcer POA -   ? Procedure - Bedside Incision and Drainage / Wound Debridement by Dr Karen Carrillo / Dr Andrzej Caldwell on 11/15/2017   ? Was using a lidocaine based cream topically prehospital   Will discuss with ID whether we are ok to use or if this would encourage further infection? ? Local Wound Care - BID dressing changes with betadine and Kerlix  · Asymptomatic Pyuria -    ? On 11/15 when I spoke with the  he stated that patient still has a stent placed for "kidney blockage" in August   However urine culture was negative  Would suggest outpatient follow up with urologist     · Bleeding from AV Graft Site - Currently no bleeding reported on dialysis, despite the drop in H/H observed on 11/17/2017  · Anemia of Chronic Disease with possible Acute Blood Loss Anemia -   ? At baseline, most hemoglobins are 7 0 - 8 0     ? Transfused 1 unit PRBC on 11/17/2017 for Hemoglobin that was down to 6 6    ? Also, as there has been no significant bleeding from the graft during this admission  ? Stool was negative for blood  ? CBCD mointoring  · ESRD on HD - Nephrology following  · Diabetes Mellitus Type 2 - Increase lantus to 30 units given hyperglycemia  Monitor blood glucose levels  · Acute Encephalopathy -   ? Negative testing - TSH, B12, NH3   ? Monitor and treat for infection (see sepsis above), the most likely cause of the encephalopathy  ? Medication Review - sinemet, vimpat, PRN morphine are all potential contributors    These medications will need to be reviewed if patient not improving with treatment of infection  ? Consider neurologist evaluation if symptoms persist   · Dysphagia - The patient is normally on tube feeding only (NPO)  On 11/15, restarted prehospital tube feeds of nepro at 50 ml/hr and FWF of 60 ml every 6 hours per my discussion with staff at [facility]  Aspiration precautions with HOB > 30 degrees at all times  · Hyponatremia - Will discuss with neurology  Correction with dialysis  Will recheck later today  · Level of Care - Currently Level 2 Stepdown  Will maintain level 2 stepdown  · Goals of Care - discussed this with  11/17/2017 who said that numerous times they have had this discussion about palliative care options  Patient has made up her mind that she has wanted to continue on with every aspect of medical care except for the fact that she wishes to be DNR  However after discussion with patient and , Damon Huntley, on 11/18/2017, they changed from level 3 to level 2 DNR, feeling that intubation is ok      VTE Pharmacologic Prophylaxis:   Pharmacologic: Pharmacologic VTE Prophylaxis contraindicated due to recent bleeding  could consider starting soon if H/H remains stable and no bleeding  Mechanical VTE Prophylaxis in Place: Yes     Current Patient Status: Inpatient   Certification Statement: The patient will continue to require additional inpatient hospital stay due to evaluations for the SIRS response, bleeding fistula, and pressure ulcer      Discharge Plan / Estimated Discharge Date: to be determined        Discharge Plan: TBD      [facility] in the [facility] by Sanchez Fernando for 2017  Network Utilization Review Department  Phone: [phone]; Fax [phone]  ATTENTION: The Network Utilization Review Department is now centralized for our 7 Facilities  Make a note that we have a new phone and fax numbers for our Department   Please call with any questions or concerns to [phone] and carefully follow the prompts so that you are directed to the right person  All voicemails are confidential  Fax any determinations, approvals, denials, and requests for initial or continue stay review clinical to [phone]  Due to HIGH CALL volume, it would be easier if you could please send faxed requests to expedite your requests and in part, help us provide discharge notifications faster

## 2017-11-20 NOTE — PROGRESS NOTES
Patient seen and sacral wound re-evaluated and she has had an increasing leukocytosis over the last 48 hours following improvement with initiation of antibiotics by the infectious disease service earlier this hospitalization  She has reportedly had her baseline mental status and has overall improved since the initiation of her antibiotics  Her sacral wound remains stage IV pressure ulcer with exposed sacrum and coccyx at the base of the wound  There is no evidence of purulent drainage or significant necrotic tissue requiring debridement this time  The markie wound skin is somewhat macerated with spotty areas of ecchymosis without evidence of surrounding cellulitis or abscess  Wound image:        - Due to the patient's immobility and chronic medical conditions, she does not appear to be a candidate for flap coverage of this wound, but I will review this with our plastic surgery service  - Recommend continued bedside wound care with wet-to-dry wound packing twice daily and/or as needed for soilage   - Continue offloading therapy with cue to our repositioning   - Optimize nutrition  - I will discussed the antibiotic therapy with the infectious disease service  Long-term IV antibiotics are likely not beneficial in the setting of exposed bone with no options for tissue coverage  - I discussed this with SLIM and the patient's       Salty Sanders PA-C  11/20/2017 2:18 PM

## 2017-11-20 NOTE — SOCIAL WORK
CM sent referral for HD at [facility] as per request of   Pt was receiving HD at [facility] previously

## 2017-11-20 NOTE — PROGRESS NOTES
Progress Note - Infectious Disease   Leo Griffin 68 y o  female MRN: [id number]  Unit/Bed#: [facility] 816-01 Encounter: [id number]      Impression/Recommendations:  1   SIRS versus sepsis  POA:  Leukocytosis and tachycardia  Evolving fever concerning for evolving infection  Blood cultures, C  Diff, CT C/A/P negative  Urine culture negative but was on antibiotics on admission (Doxycycline)  Sacral wound not acutely infected  Hemodynamically stable and non-toxic  Rising WBC likely due to recent steroids for CT prep  Rec:       · Continue cefepime for 2 more days  · Follow temperatures and white blood cell count closely  · Supportive care as per the primary service     2   Acute encephalopathy  Suspect multifactorial, ?toxic-metabolic  Suspect underlying baseline cognitive dysfunction  Markedly improved, ?role of antibiotics  Rec:  · Continue antibiotics as above  · Follow mental status closely  · Continue supportive care as per the primary service     3   Chronic stage IV sacral decubitus ulcer  No evidence for acute purulence or cellulitis  Status post bedside debridement per surgery  Rec:       · Continue LWC and offloading as possible per surgery  · No contraindication from an ID standpoint for topical lidocaine     4   ESRD on HD  Via RUE AVG     5   History of endocarditis  Recently treated at OSH  Rec:  · Continue doxycycline suppression  · Await outside hospital records     6   Disposition  Consider palliative care consultation for symptom management and possible consideration of comfort care     Antibiotics:  Cefepime #5  (Doxycycline)    Subjective:  Patient seen on AM rounds  Continues to complain of biotics pain  According to nursing wound appeared relatively clean at dressing change without cellulitis or purulence  No documented fevers, chills, sweats, nausea, vomiting  Having multiple loose stools documented  Had recurrent episodes of desaturation yesterday requiring non-rebreather    Chest x-ray showed vascular congestion     Objective:  Vitals:  HR:  [60] 60  Resp:  [15-18] 16  BP: (100-147)/(45-60) 127/60  SpO2:  [93 %-100 %] 99 %  Temp (24hrs), Av 8 °F (36 6 °C), Min:97 5 °F (36 4 °C), Max:98 °F (36 7 °C)  Current: Temperature: 97 5 °F (36 4 °C)    Physical Exam:   General:  No acute distress  Eyes:  Normal lids and conjunctivae  ENT:  Normal external ears and nose  Neck:  Neck symmetric with midline trachea  Pulmonary:  Normal respiratory effort without accessory muscle use  Cardiovascular:  Regular rate and rhythm; no peripheral edema  Gastrointestinal:  No tenderness or distention  Musculoskeletal:  No digital clubbing or cyanosis  Skin:  No visible rashes; No palpable nodules  Neurologic:  Sensation grossly intact to light touch  Psychiatric:  Alert and oriented; flat affect, slow to answer questions    Lab Results:  I have personally reviewed pertinent labs      Results from last 7 days  Lab Units 17  0616 17  0729 17  0730 17  0520 11/15/17  0546 17  1727   SODIUM mmol/L 131* 126* 132* 140 141 144   POTASSIUM mmol/L 3 5 3 8 5 2 5 1 3 5 3 5   CHLORIDE mmol/L 92* 89* 93* 100 100 98*   CO2 mmol/L 30 26 29 31 35* 36*   ANION GAP mmol/L 9 11 10 9 6 10   BUN mg/dL 37* 73* 48* 54* 36* 24   CREATININE mg/dL 1 76* 2 63* 2 49* 3 49* 2 34* 1 75*   EGFR ml/min/1 73sq m 28 17 18 12 19 28   GLUCOSE RANDOM mg/dL 164* 215* 358* 149* 93 175*   CALCIUM mg/dL 8 8 9 3 8 6 8 6 8 5 8 8   AST U/L  --   --   --  46* 24 19   ALT U/L  --   --   --  9* 9* 9*   ALK PHOS U/L  --   --   --  117* 115 147*   TOTAL PROTEIN g/dL  --   --   --  6 5 6 6 7 4   ALBUMIN g/dL  --   --   --  2 5* 2 7* 2 8*   BILIRUBIN TOTAL mg/dL  --   --   --  0 77 0 72 0 50       Results from last 7 days  Lab Units 17  0616 17  0729 17  1135   WBC Thousand/uL 26 45* 25 57* 19 32*   HEMOGLOBIN g/dL 9 4* 9 7* 6 6*   PLATELETS Thousands/uL 271 322 267       Results from last 7 days  Lab Units 11/17/17  2115 11/16/17  1004 11/16/17  1003 11/15/17  0150 11/14/17  2230 11/14/17  1856 11/14/17  1727   BLOOD CULTURE   --  No Growth at 72 hrs  No Growth at 72 hrs   --   --   --  No Growth After 4 Days  GRAM STAIN RESULT   --   --   --   --   --  No Polys or Bacteria seen  --    URINE CULTURE   --   --   --   --  No Growth <1000 cfu/mL  --   --    WOUND CULTURE   --   --   --   --   --  2+ Growth of Proteus mirabilis*  2+ Growth of Escherichia coli ESBL*  1+ Growth of Vancomycin Resistant Enterococcus faecalis*  Few Colonies of Pseudomonas aeruginosa*  --    MRSA CULTURE ONLY   --   --   --  No Methicillin Resistant Staphlyococcus aureus (MRSA) isolated  --   --   --    C DIFF TOXIN B  NEGATIVE for C difficle toxin by PCR    --   --   --   --   --   --        Imaging Studies:   I have personally reviewed pertinent imaging study reports and images in PACS  Chest x-ray 11/20 vascular congestion and pleural effusions    EKG, Pathology, and Other Studies:   I have personally reviewed pertinent reports

## 2017-11-21 ENCOUNTER — APPOINTMENT (INPATIENT)
Dept: DIALYSIS | Facility: HOSPITAL | Age: 77
DRG: 853 | End: 2017-11-21
Attending: INTERNAL MEDICINE
Payer: MEDICARE

## 2017-11-21 LAB
ANISOCYTOSIS BLD QL SMEAR: PRESENT
BACTERIA BLD CULT: NORMAL
BACTERIA BLD CULT: NORMAL
BASOPHILS # BLD MANUAL: 0 THOUSAND/UL (ref 0–0.1)
BASOPHILS NFR MAR MANUAL: 0 % (ref 0–1)
CORTIS SERPL-MCNC: 26.1 UG/DL
EOSINOPHIL # BLD MANUAL: 0 THOUSAND/UL (ref 0–0.4)
EOSINOPHIL NFR BLD MANUAL: 0 % (ref 0–6)
ERYTHROCYTE [DISTWIDTH] IN BLOOD BY AUTOMATED COUNT: 16.9 % (ref 11.6–15.1)
GLUCOSE SERPL-MCNC: 133 MG/DL (ref 65–140)
GLUCOSE SERPL-MCNC: 185 MG/DL (ref 65–140)
GLUCOSE SERPL-MCNC: 185 MG/DL (ref 65–140)
GLUCOSE SERPL-MCNC: 200 MG/DL (ref 65–140)
GLUCOSE SERPL-MCNC: 269 MG/DL (ref 65–140)
HCT VFR BLD AUTO: 28 % (ref 34.8–46.1)
HGB BLD-MCNC: 9 G/DL (ref 11.5–15.4)
LYMPHOCYTES # BLD AUTO: 20 % (ref 14–44)
LYMPHOCYTES # BLD AUTO: 5.64 THOUSAND/UL (ref 0.6–4.47)
MCH RBC QN AUTO: 28 PG (ref 26.8–34.3)
MCHC RBC AUTO-ENTMCNC: 32.1 G/DL (ref 31.4–37.4)
MCV RBC AUTO: 87 FL (ref 82–98)
MONOCYTES # BLD AUTO: 1.69 THOUSAND/UL (ref 0–1.22)
MONOCYTES NFR BLD: 6 % (ref 4–12)
NEUTROPHILS # BLD MANUAL: 20.87 THOUSAND/UL (ref 1.85–7.62)
NEUTS SEG NFR BLD AUTO: 74 % (ref 43–75)
NRBC BLD AUTO-RTO: 0 /100 WBCS
PLATELET # BLD AUTO: 241 THOUSANDS/UL (ref 149–390)
PLATELET BLD QL SMEAR: ABNORMAL
PMV BLD AUTO: 9 FL (ref 8.9–12.7)
RBC # BLD AUTO: 3.22 MILLION/UL (ref 3.81–5.12)
RBC MORPH BLD: PRESENT
WBC # BLD AUTO: 28.2 THOUSAND/UL (ref 4.31–10.16)

## 2017-11-21 PROCEDURE — 82948 REAGENT STRIP/BLOOD GLUCOSE: CPT

## 2017-11-21 PROCEDURE — 85007 BL SMEAR W/DIFF WBC COUNT: CPT | Performed by: INTERNAL MEDICINE

## 2017-11-21 PROCEDURE — 85027 COMPLETE CBC AUTOMATED: CPT | Performed by: INTERNAL MEDICINE

## 2017-11-21 PROCEDURE — 82533 TOTAL CORTISOL: CPT | Performed by: INTERNAL MEDICINE

## 2017-11-21 PROCEDURE — 94760 N-INVAS EAR/PLS OXIMETRY 1: CPT

## 2017-11-21 RX ADMIN — METOPROLOL TARTRATE 25 MG: 25 TABLET ORAL at 18:15

## 2017-11-21 RX ADMIN — CARBIDOPA AND LEVODOPA 1 TABLET: 25; 100 TABLET ORAL at 20:42

## 2017-11-21 RX ADMIN — HYOSCYAMINE SULFATE 1 APPLICATION: 16 SOLUTION at 20:44

## 2017-11-21 RX ADMIN — Medication 100 MG: at 13:12

## 2017-11-21 RX ADMIN — METOPROLOL TARTRATE 25 MG: 25 TABLET ORAL at 13:09

## 2017-11-21 RX ADMIN — INSULIN GLARGINE 38 UNITS: 100 INJECTION, SOLUTION SUBCUTANEOUS at 22:02

## 2017-11-21 RX ADMIN — CALCIUM ACETATE 667 MG: 667 CAPSULE ORAL at 13:09

## 2017-11-21 RX ADMIN — Medication 100 MG: at 20:42

## 2017-11-21 RX ADMIN — TRAMADOL HYDROCHLORIDE 25 MG: 50 TABLET, FILM COATED ORAL at 16:23

## 2017-11-21 RX ADMIN — MIDODRINE HYDROCHLORIDE 5 MG: 5 TABLET ORAL at 20:42

## 2017-11-21 RX ADMIN — MIDODRINE HYDROCHLORIDE 5 MG: 5 TABLET ORAL at 08:51

## 2017-11-21 RX ADMIN — INSULIN LISPRO 4 UNITS: 100 INJECTION, SOLUTION INTRAVENOUS; SUBCUTANEOUS at 05:22

## 2017-11-21 RX ADMIN — GUAIFENESIN 200 MG: 100 SOLUTION ORAL at 13:09

## 2017-11-21 RX ADMIN — HYOSCYAMINE SULFATE 1 APPLICATION: 16 SOLUTION at 13:10

## 2017-11-21 RX ADMIN — CARBIDOPA AND LEVODOPA 1 TABLET: 25; 100 TABLET ORAL at 16:23

## 2017-11-21 RX ADMIN — INSULIN LISPRO 2 UNITS: 100 INJECTION, SOLUTION INTRAVENOUS; SUBCUTANEOUS at 18:15

## 2017-11-21 RX ADMIN — DOXYCYCLINE 100 MG: 100 CAPSULE ORAL at 18:14

## 2017-11-21 RX ADMIN — CEFEPIME HYDROCHLORIDE 1000 MG: 1 INJECTION, POWDER, FOR SOLUTION INTRAMUSCULAR; INTRAVENOUS at 15:56

## 2017-11-21 RX ADMIN — MICONAZOLE NITRATE: 20 CREAM TOPICAL at 13:10

## 2017-11-21 RX ADMIN — GUAIFENESIN 200 MG: 100 SOLUTION ORAL at 01:28

## 2017-11-21 RX ADMIN — CALCIUM ACETATE 667 MG: 667 CAPSULE ORAL at 16:23

## 2017-11-21 RX ADMIN — LEVOTHYROXINE SODIUM 37.5 MCG: 75 TABLET ORAL at 22:02

## 2017-11-21 RX ADMIN — DOXYCYCLINE 100 MG: 100 CAPSULE ORAL at 13:09

## 2017-11-21 RX ADMIN — GUAIFENESIN 200 MG: 100 SOLUTION ORAL at 20:42

## 2017-11-21 RX ADMIN — MIDODRINE HYDROCHLORIDE 5 MG: 5 TABLET ORAL at 16:23

## 2017-11-21 RX ADMIN — IPRATROPIUM BROMIDE 2 PUFF: 17 AEROSOL, METERED RESPIRATORY (INHALATION) at 16:24

## 2017-11-21 RX ADMIN — IPRATROPIUM BROMIDE 2 PUFF: 17 AEROSOL, METERED RESPIRATORY (INHALATION) at 20:45

## 2017-11-21 RX ADMIN — MICONAZOLE NITRATE: 20 CREAM TOPICAL at 18:15

## 2017-11-21 NOTE — PROGRESS NOTES
Progress Note - Infectious Disease   Saman Magdaleno 68 y o  female MRN: [id number]  Unit/Bed#: [facility] 816-01 Encounter: [id number]      Impression/Recommendations:  1   SIRS versus sepsis  POA:  Leukocytosis and tachycardia  Evolving fever concerning for evolving infection  Blood cultures, C  Diff, CT C/A/P negative  Urine culture negative but was on antibiotics on admission (Doxycycline)  Sacral wound not acutely infected  Hemodynamically stable and non-toxic  Rising WBC likely due to recent steroids for CT prep  Rec:       · Continue cefepime for 1 more day  · Follow temperatures and white blood cell count closely  · Supportive care as per the primary service     2   Acute encephalopathy  Suspect multifactorial, ?toxic-metabolic  Suspect underlying baseline cognitive dysfunction  Improved, ?role of antibiotics  Rec:  · Continue antibiotics as above  · Follow mental status closely  · Continue supportive care as per the primary service     3   Chronic stage IV sacral decubitus ulcer  No evidence for acute purulence or cellulitis  Consider chronic osteomyelitis given exposed bone  Status post bedside debridement per surgery  Rec:       · Continue LWC and offloading as possible per surgery  · No role for prolonged IV antibiotics given no options for bone coverage  · Agree that a poor candidate for flap surgery     4   ESRD on HD  Via RUE AVG     5   History of endocarditis  Recently treated at OSH  Rec:  · Continue doxycycline suppression  · Await outside hospital records     6   Disposition  Consider palliative care consultation for symptom management and possible consideration of comfort care     Discussed in detail with the Red Surgery team     Antibiotics:  Cefepime #6  (Doxycycline)    Subjective:  Patient seen on AM rounds  Currently getting dialysis  Seems a bit more lethargic today  Does not provide a review of systems but upon questioning states that she does continue to have pain in her buttocks    No documented fevers, chills, sweats, nausea, vomiting  Occasional loose stool  Objective:  Vitals:  HR:  [60-82] 70  Resp:  [18] 18  BP: ()/(34-73) 106/34  SpO2:  [95 %-100 %] 95 %  Temp (24hrs), Av 8 °F (36 6 °C), Min:97 3 °F (36 3 °C), Max:98 2 °F (36 8 °C)  Current: Temperature: 98 2 °F (36 8 °C)    Physical Exam:   General:  No acute distress  Eyes:  Normal lids and conjunctivae  ENT:  Normal external ears and nose  Neck:  Neck symmetric with midline trachea  Pulmonary:  Normal respiratory effort without accessory muscle use  Cardiovascular:  Regular rate and rhythm; no peripheral edema  Gastrointestinal:  No tenderness or distention  Musculoskeletal:  No digital clubbing or cyanosis  Skin:  No visible rashes; No palpable nodules  Neurologic:  Sensation grossly intact to light touch  Psychiatric:  Alert and oriented; but slow to answer questions and then offers minimal verbalization  Wound pics from surgery  reviewed:  Stage IV wound with some necrotic slough  No cellulitis or purulence    Lab Results:  I have personally reviewed pertinent labs      Results from last 7 days  Lab Units 17  0616 17  0729 17  0730 17  0520 11/15/17  0546 17  1727   SODIUM mmol/L 131* 126* 132* 140 141 144   POTASSIUM mmol/L 3 5 3 8 5 2 5 1 3 5 3 5   CHLORIDE mmol/L 92* 89* 93* 100 100 98*   CO2 mmol/L 30 26 29 31 35* 36*   ANION GAP mmol/L 9 11 10 9 6 10   BUN mg/dL 37* 73* 48* 54* 36* 24   CREATININE mg/dL 1 76* 2 63* 2 49* 3 49* 2 34* 1 75*   EGFR ml/min/1 73sq m 28 17 18 12 19 28   GLUCOSE RANDOM mg/dL 164* 215* 358* 149* 93 175*   CALCIUM mg/dL 8 8 9 3 8 6 8 6 8 5 8 8   AST U/L  --   --   --  46* 24 19   ALT U/L  --   --   --  9* 9* 9*   ALK PHOS U/L  --   --   --  117* 115 147*   TOTAL PROTEIN g/dL  --   --   --  6 5 6 6 7 4   ALBUMIN g/dL  --   --   --  2 5* 2 7* 2 8*   BILIRUBIN TOTAL mg/dL  --   --   --  0 77 0 72 0 50       Results from last 7 days  Lab Units 17  0739 11/20/17  0616 11/19/17  0729   WBC Thousand/uL 28 20* 26 45* 25 57*   HEMOGLOBIN g/dL 9 0* 9 4* 9 7*   PLATELETS Thousands/uL 241 271 322       Results from last 7 days  Lab Units 11/17/17  2115 11/16/17  1004 11/16/17  1003 11/15/17  0150 11/14/17  2230 11/14/17  1856 11/14/17  1727   BLOOD CULTURE   --  No Growth After 4 Days  No Growth After 4 Days  --   --   --  No Growth After 5 Days  GRAM STAIN RESULT   --   --   --   --   --  No Polys or Bacteria seen  --    URINE CULTURE   --   --   --   --  No Growth <1000 cfu/mL  --   --    WOUND CULTURE   --   --   --   --   --  2+ Growth of Proteus mirabilis*  2+ Growth of Escherichia coli ESBL*  1+ Growth of Vancomycin Resistant Enterococcus faecalis*  Few Colonies of Pseudomonas aeruginosa*  --    MRSA CULTURE ONLY   --   --   --  No Methicillin Resistant Staphlyococcus aureus (MRSA) isolated  --   --   --    C DIFF TOXIN B  NEGATIVE for C difficle toxin by PCR    --   --   --   --   --   --        Imaging Studies:   I have personally reviewed pertinent imaging study reports and images in PACS  EKG, Pathology, and Other Studies:   I have personally reviewed pertinent reports

## 2017-11-21 NOTE — NUTRITION
11/21/17 1458   Recommendations/Interventions   Summary Hyponatremia noted- increased free water w/ All administration   Interventions EN flush change (specify)   Nutrition Recommendations Adjust EN/PN  (Add Nephrocaps; Consider d/c current flush regimen (adds 240ml) with All administration (adds 240ml) due to hyponatremia)

## 2017-11-21 NOTE — PROGRESS NOTES
NEPHROLOGY PROGRESS NOTE    Jeanie Puga 68 y o  female MRN: [id number]  Unit/Bed#: [facility] 239-44 Encounter: [id number]  Reason for Consult:  End-stage renal disease hemodialysis    ASSESSMENT/PLAN:  1  Renal     Patient has end-stage renal disease hemodialysis will be performed today  Dialysis is outline below  Overall patient is really not really verbal for os she is in no distress she is looking around the room  Continue current dialysis plans  HEMODIALYSIS PROCEDURE NOTE  The patient was seen and examined on hemodialysis  Time:  4   hours  Sodium:  138 Blood flow:  350   Dialyzer: F160 Potassium:  3 Dialysate flow:  600   Access:  AV Bicarbonate:  35 Ultrafiltration goal:  2        2  Sacral decubitus  Undergoing local debridement surgeries on case  SUBJECTIVE:  ROS  Patient not really verbal for me so gives no review of systems she is in no distress  OBJECTIVE:  Current Weight: Weight - Scale: 65 3 kg (143 lb 15 4 oz)  Vitals:Temp (24hrs), Av 8 °F (36 6 °C), Min:97 3 °F (36 3 °C), Max:98 2 °F (36 8 °C)  Current: Temperature: 98 2 °F (36 8 °C)   Blood pressure (!) 111/45, pulse 82, temperature 98 2 °F (36 8 °C), temperature source Tympanic, resp  rate 18, height 5' 2" (1 575 m), weight 65 3 kg (143 lb 15 4 oz), SpO2 95 %  ,Body mass index is 26 33 kg/m²  Intake/Output Summary (Last 24 hours) at 17 1003  Last data filed at 17 0755   Gross per 24 hour   Intake             1380 ml   Output                0 ml   Net             1380 ml       Physical Exam: BP (!) 111/45   Pulse 82   Temp 98 2 °F (36 8 °C) (Tympanic)   Resp 18   Ht 5' 2" (1 575 m)   Wt 65 3 kg (143 lb 15 4 oz)   LMP  (LMP Unknown)   SpO2 95%   BMI 26 33 kg/m²   Physical Exam   Constitutional: No distress  Eyes: No scleral icterus  Neck: No JVD present  Cardiovascular: Normal rate  Exam reveals no friction rub  Pulmonary/Chest: Effort normal  No respiratory distress  She has no wheezes   She has no rales    Abdominal: Soft  Bowel sounds are normal  She exhibits no distension  There is no tenderness  There is no rebound         Medications:    Current Facility-Administered Medications:     acetaminophen (TYLENOL) tablet 650 mg, 650 mg, Per PEG Tube, Q4H PRN, Yahaira Vanegas MD, 650 mg at 11/18/17 1133    bisacodyl (DULCOLAX) rectal suppository 10 mg, 10 mg, Rectal, PRN, Yahaira Vanegas MD    calcium acetate (PHOSLO) capsule 667 mg, 667 mg, Oral, TID With Meals, Maria Del Rosario Mayen, DO, 667 mg at 11/20/17 1625    carbidopa-levodopa (SINEMET)  mg per tablet 1 tablet, 1 tablet, Per PEG Tube, TID, Yahaira Vanegas MD, 1 tablet at 11/20/17 2038    cefepime (MAXIPIME) 1,000 mg in dextrose 5 % 50 mL IVPB, 1,000 mg, Intravenous, Q24H, Meghan Reid MD, Stopped at 11/20/17 1730    doxycycline hyclate (VIBRAMYCIN) capsule 100 mg, 100 mg, Per PEG Tube, BID, Yahaira Vanegas MD, 100 mg at 11/20/17 1747    guaiFENesin (ROBITUSSIN) oral solution 200 mg, 200 mg, Per PEG Tube, Q6H, [facility], DO, 200 mg at 11/21/17 0128    insulin glargine (LANTUS) subcutaneous injection 38 Units, 38 Units, Subcutaneous, HS, [facility], DO, 38 Units at 11/20/17 2137    insulin lispro (HumaLOG) 100 units/mL subcutaneous injection 2-12 Units, 2-12 Units, Subcutaneous, Q6H, [facility], DO, 4 Units at 11/21/17 0522    ipratropium (ATROVENT HFA) inhaler 2 puff, 2 puff, Inhalation, TID, Dario Amador MD, 2 puff at 11/20/17 2038    lacosamide (VIMPAT) 10 mg/mL oral solution 100 mg, 100 mg, Per PEG Tube, BID, Yahaira Vanegas MD, 100 mg at 11/20/17 1750    levothyroxine tablet 37 5 mcg, 37 5 mcg, Per PEG Tube, HS, Yahaira Vanegas MD, 37 5 mcg at 11/20/17 2136    metoprolol tartrate (LOPRESSOR) tablet 25 mg, 25 mg, Per PEG Tube, BID, Yahaira Vanegas MD, 25 mg at 11/20/17 1747    miconazole 2 % cream, , Topical, BID, Yahaira Vanegas MD    midodrine (PROAMATINE) tablet 5 mg, 5 mg, Per PEG Tube, TID, Joshua Gayle Karen Umana MD, 5 mg at 11/21/17 0851    ondansetron [facility] injection 4 mg, 4 mg, Intravenous, Q6H PRN, Poonam Santiago MD    sodium hypochlorite (HYSEPT) 0 25 % topical solution 1 application, 1 application, Irrigation, TID, Poonam Santiago MD, 1 application at 90/25/97 0022    traMADol (ULTRAM) tablet 25 mg, 25 mg, Per G Tube, Q6H PRN, Amish Cardenas DO, 25 mg at 11/19/17 1957    Laboratory Results:  Lab Results   Component Value Date    WBC 28 20 (H) 11/21/2017    HGB 9 0 (L) 11/21/2017    HCT 28 0 (L) 11/21/2017    MCV 87 11/21/2017     11/21/2017     Lab Results   Component Value Date    GLUCOSE 164 (H) 11/20/2017    CALCIUM 8 8 11/20/2017     (L) 11/20/2017    K 3 5 11/20/2017    CO2 30 11/20/2017    CL 92 (L) 11/20/2017    BUN 37 (H) 11/20/2017    CREATININE 1 76 (H) 11/20/2017     Lab Results   Component Value Date    CALCIUM 8 8 11/20/2017    PHOS 3 3 11/15/2017     No results found for: LABPROT

## 2017-11-21 NOTE — PROGRESS NOTES
[facility] Internal Medicine Progress Note  Patient: George Schlatter 68 y o  female   MRN: [id number]  PCP: Rolando Aburto DO  [facility] 816-01 Encounter: [id number]  Date Of Visit: 11/21/17    Assessment/Plan:  · SIRS vs sepsis POA - possible sources include sacral decubitus ulcer infection, UTI, possible aspiration however urine cultures negative and CT chest shows no evidence of pneumonia  Sacral wound likely source of infection  Patient is to continue IV cefepime for more more day per ID  Blood cultures remain negative  · Chronic stage IV sacral decubitus ulcer POA - continue local wound care and offloading  General surgery following  · Asymptomatic pyuria - patient apparently has a stent in place since August   Will need outpatient follow-up with Urology  · AV graft site bleed - resolved  · Anemia of chronic disease/probable acute blood loss anemia - Hb stable at 9  She is status post 1 unit PRBC transfusion on 11/17/17  · Type 2 diabetes with hyperglycemia - Lantus was increased to 38 units last night  Continue to monitor  · ESRD on HD - management per Nephrology  · Acute encephalopathy - likely multifactorial secondary to all of the above with prolonged hospitalization  Random Cortisol levels were unremarkable  TSH, B12, ammonia levels also unremarkable  · Dysphagia/peg tube in place pre hospital - tolerating tube feeds  · Functional quadriplegia - continue supportive care  · Pulmonary edema - volume management with dialysis    VTE Pharmacologic Prophylaxis:   Pharmacologic: Pharmacologic VTE Prophylaxis contraindicated due to Recent AV graft bleeding  Mechanical VTE Prophylaxis in Place: Yes    Patient Centered Rounds: I have performed bedside rounds with nursing staff today      Discussions with Specialists or Other Care Team Provider:  Nursing    Education and Discussions with Family / Patient:      Current Length of Stay: 6 day(s)    Current Patient Status: Inpatient   Certification Statement: The patient will continue to require additional inpatient hospital stay due to Above assessment and care plan    Discharge Plan:  Anticipate discharge after completion of IV antibiotics    Code Status: Level 2 - DNAR: but accepts endotracheal intubation      Subjective:   Patient seen and evaluated  Complains of pain but does not state where she is having pain nor rates her pain  No distress noted  Offers very minimal verbal responses    Objective:     Vitals:   Temp (24hrs), Av °F (36 7 °C), Min:97 6 °F (36 4 °C), Max:98 3 °F (36 8 °C)    HR:  [68-82] 75  Resp:  [18] 18  BP: ()/(34-73) 135/60  SpO2:  [95 %-100 %] 100 %  Body mass index is 26 33 kg/m²  Input and Output Summary (last 24 hours):        Intake/Output Summary (Last 24 hours) at 17 1611  Last data filed at 17 1200   Gross per 24 hour   Intake             1680 ml   Output             1800 ml   Net             -120 ml       Physical Exam:  General Appearance:    Alert, chronically ill-appearing, no distress   Head:    Normocephalic, without obvious abnormality, atraumatic, mucous membranes moist    Eyes:    Conjunctiva/corneas clear, EOM's intact   Neck:   Supple   Lungs:     Clear anteriorly, decreased at the bases, poor effort     Heart:    Regular rate and rhythm, S1 and S2, +SM    Abdomen:     Soft, non-tender, peg site intact without surrounding erythema or drainage   Extremities:   No edema, rue contractures   Neurologic:   Responds to call, minimal verbal response, functional quadriplegia         Additional Data:     Labs:      Results from last 7 days  Lab Units 17  0739  17  0729   WBC Thousand/uL 28 20*  < > 25 57*   HEMOGLOBIN g/dL 9 0*  < > 9 7*   HEMATOCRIT % 28 0*  < > 31 1*   PLATELETS Thousands/uL 241  < > 322   NEUTROS PCT %  --   --  60   LYMPHS PCT %  --   --  34   LYMPHO PCT % 20  < >  --    MONOS PCT %  --   --  6   MONO PCT MAN % 6  < >  --    EOS PCT %  --   --  0   EOSINO PCT MANUAL % 0  < >  --    < > = values in this interval not displayed  Results from last 7 days  Lab Units 11/20/17  0616  11/16/17  0520   SODIUM mmol/L 131*  < > 140   POTASSIUM mmol/L 3 5  < > 5 1   CHLORIDE mmol/L 92*  < > 100   CO2 mmol/L 30  < > 31   BUN mg/dL 37*  < > 54*   CREATININE mg/dL 1 76*  < > 3 49*   CALCIUM mg/dL 8 8  < > 8 6   TOTAL PROTEIN g/dL  --   --  6 5   BILIRUBIN TOTAL mg/dL  --   --  0 77   ALK PHOS U/L  --   --  117*   ALT U/L  --   --  9*   AST U/L  --   --  46*   GLUCOSE RANDOM mg/dL 164*  < > 149*   < > = values in this interval not displayed  Results from last 7 days  Lab Units 11/15/17  0547   INR  1 41*       * I Have Reviewed All Lab Data Listed Above  * Additional Pertinent Lab Tests Reviewed: [facility] Admission Reviewed    Cultures:   Blood Culture:   Lab Results   Component Value Date    BLOODCX No Growth After 5 Days  11/16/2017    BLOODCX No Growth After 5 Days  11/16/2017    BLOODCX No Growth After 5 Days  11/14/2017    BLOODCX No Growth After 5 Days  05/25/2017    BLOODCX No Growth After 5 Days   05/25/2017    BLOODCX Staphylococcus epidermidis 05/22/2017    BLOODCX Staphylococcus epidermidis 05/21/2017     Urine Culture:   Lab Results   Component Value Date    URINECX No Growth <1000 cfu/mL 11/14/2017     Sputum Culture: No components found for: SPUTUMCX  Wound Culture:   Lab Results   Component Value Date    WOUNDCULT 2+ Growth of Proteus mirabilis (A) 11/14/2017    WOUNDCULT 2+ Growth of Escherichia coli ESBL (A) 11/14/2017    WOUNDCULT (A) 11/14/2017     1+ Growth of Vancomycin Resistant Enterococcus faecalis    WOUNDCULT Few Colonies of Pseudomonas aeruginosa (A) 11/14/2017       Last 24 Hours Medication List:     calcium acetate 667 mg Oral TID With Meals   carbidopa-levodopa 1 tablet Per PEG Tube TID   cefepime 1,000 mg Intravenous Q24H   doxycycline hyclate 100 mg Per PEG Tube BID   guaiFENesin 200 mg Per PEG Tube Q6H   insulin glargine 38 Units Subcutaneous HS   insulin lispro 2-12 Units Subcutaneous Q6H   ipratropium 2 puff Inhalation TID   lacosamide 100 mg Per PEG Tube BID   levothyroxine 37 5 mcg Per PEG Tube HS   metoprolol tartrate 25 mg Per PEG Tube BID   miconazole  Topical BID   midodrine 5 mg Per PEG Tube TID   sodium hypochlorite 1 application Irrigation TID        Today, Patient Was Seen By: Darwin Reis MD    ** Please Note: Dragon 360 Dictation voice to text software may have been used in the creation of this document   **

## 2017-11-21 NOTE — PROGRESS NOTES
Rounded with Dr Oliver with [facility], while pt was at [facility]  Pt has two more days of IV Abx  Will continue to monitor

## 2017-11-22 VITALS
HEART RATE: 67 BPM | HEIGHT: 62 IN | SYSTOLIC BLOOD PRESSURE: 144 MMHG | WEIGHT: 140.21 LBS | RESPIRATION RATE: 18 BRPM | DIASTOLIC BLOOD PRESSURE: 61 MMHG | TEMPERATURE: 98.3 F | BODY MASS INDEX: 25.8 KG/M2 | OXYGEN SATURATION: 96 %

## 2017-11-22 PROBLEM — A41.9 SEPSIS (HCC): Status: RESOLVED | Noted: 2017-05-22 | Resolved: 2017-11-22

## 2017-11-22 PROBLEM — T82.838A BLEEDING PSEUDOANEURYSM OF RIGHT BRACHIOCEPHALIC AV FISTULA (HCC): Status: RESOLVED | Noted: 2017-11-15 | Resolved: 2017-11-22

## 2017-11-22 LAB
GLUCOSE SERPL-MCNC: 131 MG/DL (ref 65–140)
GLUCOSE SERPL-MCNC: 145 MG/DL (ref 65–140)
GLUCOSE SERPL-MCNC: 224 MG/DL (ref 65–140)

## 2017-11-22 PROCEDURE — 82948 REAGENT STRIP/BLOOD GLUCOSE: CPT

## 2017-11-22 PROCEDURE — 94760 N-INVAS EAR/PLS OXIMETRY 1: CPT

## 2017-11-22 PROCEDURE — 94762 N-INVAS EAR/PLS OXIMTRY CONT: CPT

## 2017-11-22 RX ORDER — INSULIN GLARGINE 100 [IU]/ML
38 INJECTION, SOLUTION SUBCUTANEOUS
Qty: 10 ML | Refills: 0 | Status: ON HOLD | OUTPATIENT
Start: 2017-11-22 | End: 2017-12-08

## 2017-11-22 RX ORDER — TRAMADOL HYDROCHLORIDE 50 MG/1
25 TABLET ORAL EVERY 6 HOURS PRN
Qty: 30 TABLET | Refills: 0 | Status: SHIPPED | OUTPATIENT
Start: 2017-11-22 | End: 2017-12-02

## 2017-11-22 RX ORDER — FUROSEMIDE 40 MG/1
40 TABLET ORAL DAILY
Refills: 0
Start: 2017-11-22 | End: 2017-12-08

## 2017-11-22 RX ORDER — ACETAMINOPHEN 325 MG/1
650 TABLET ORAL EVERY 4 HOURS PRN
Qty: 30 TABLET | Refills: 0 | Status: SHIPPED | OUTPATIENT
Start: 2017-11-22

## 2017-11-22 RX ORDER — FUROSEMIDE 40 MG/1
40 TABLET ORAL DAILY
Status: DISCONTINUED | OUTPATIENT
Start: 2017-11-22 | End: 2017-11-22 | Stop reason: HOSPADM

## 2017-11-22 RX ORDER — LACOSAMIDE 10 MG/ML
100 SOLUTION ORAL 2 TIMES DAILY
Qty: 200 ML | Refills: 0 | Status: ON HOLD | OUTPATIENT
Start: 2017-11-22 | End: 2017-12-08

## 2017-11-22 RX ORDER — MIDODRINE HYDROCHLORIDE 5 MG/1
5 TABLET ORAL 3 TIMES DAILY
Refills: 0
Start: 2017-11-22 | End: 2017-12-08

## 2017-11-22 RX ADMIN — MIDODRINE HYDROCHLORIDE 5 MG: 5 TABLET ORAL at 08:49

## 2017-11-22 RX ADMIN — CALCIUM ACETATE 667 MG: 667 CAPSULE ORAL at 11:49

## 2017-11-22 RX ADMIN — DOXYCYCLINE 100 MG: 100 CAPSULE ORAL at 08:48

## 2017-11-22 RX ADMIN — CALCIUM ACETATE 667 MG: 667 CAPSULE ORAL at 17:08

## 2017-11-22 RX ADMIN — Medication 100 MG: at 08:49

## 2017-11-22 RX ADMIN — CARBIDOPA AND LEVODOPA 1 TABLET: 25; 100 TABLET ORAL at 17:08

## 2017-11-22 RX ADMIN — MIDODRINE HYDROCHLORIDE 5 MG: 5 TABLET ORAL at 17:08

## 2017-11-22 RX ADMIN — INSULIN LISPRO 6 UNITS: 100 INJECTION, SOLUTION INTRAVENOUS; SUBCUTANEOUS at 01:14

## 2017-11-22 RX ADMIN — MICONAZOLE NITRATE: 20 CREAM TOPICAL at 08:50

## 2017-11-22 RX ADMIN — INSULIN LISPRO 4 UNITS: 100 INJECTION, SOLUTION INTRAVENOUS; SUBCUTANEOUS at 11:49

## 2017-11-22 RX ADMIN — CALCIUM ACETATE 667 MG: 667 CAPSULE ORAL at 07:32

## 2017-11-22 RX ADMIN — GUAIFENESIN 200 MG: 100 SOLUTION ORAL at 01:14

## 2017-11-22 RX ADMIN — IPRATROPIUM BROMIDE 2 PUFF: 17 AEROSOL, METERED RESPIRATORY (INHALATION) at 08:49

## 2017-11-22 RX ADMIN — GUAIFENESIN 200 MG: 100 SOLUTION ORAL at 07:32

## 2017-11-22 RX ADMIN — IPRATROPIUM BROMIDE 2 PUFF: 17 AEROSOL, METERED RESPIRATORY (INHALATION) at 17:10

## 2017-11-22 RX ADMIN — CARBIDOPA AND LEVODOPA 1 TABLET: 25; 100 TABLET ORAL at 08:49

## 2017-11-22 RX ADMIN — METOPROLOL TARTRATE 25 MG: 25 TABLET ORAL at 08:49

## 2017-11-22 RX ADMIN — CEFEPIME HYDROCHLORIDE 1000 MG: 1 INJECTION, POWDER, FOR SOLUTION INTRAMUSCULAR; INTRAVENOUS at 15:33

## 2017-11-22 NOTE — PROGRESS NOTES
NEPHROLOGY PROGRESS NOTE   Sarai De León 68 y o  female MRN: [id number]  Unit/Bed#: [facility] 370-21 Encounter: [id number]  Reason for Consult:  ESRD    ASSESSMENT and PLAN:    66-year-old female with end-stage renal disease on hemodialysis admitted on November 15th bleeding from AV fistula site  Bleeding has since resolved and she is being treated for stage IV decubitus ulcer with possible infection      1 )  End-stage renal disease on hemodialysis  - transitioned from a TTS to a Monday Wednesday Friday schedule  - [facility]  - access:  HeRo Graft, no bleeding, no issues  - plan for a short  IHD treatment today approximately 1 5 hour  Spoke with her  who was concerned about 2 days gaps x2 over the next week and that she may get sick  - stable from a renal standpoint for discharge     2 )  Hypertension  - blood pressure acceptable     3 )  Anemia of chronic kidney disease  - hemoglobin close to goal     4 )  Hyponatremia  - monitor with dialysis  - fluid restriction     5 )  Leukocytosis  - impaired can a biotic says per Infectious Disease      SUBJECTIVE / INTERVAL HISTORY:  No overnight events  Underwent dialysis yesterday      OBJECTIVE:  Current Weight: Weight - Scale: 63 6 kg (140 lb 3 4 oz)  Vitals:    11/22/17 0355 11/22/17 0551 11/22/17 0700 11/22/17 0740   BP:   147/66    Pulse:   67    Resp:   16    Temp:   97 6 °F (36 4 °C)    TempSrc:   Axillary    SpO2: 99%  97% 98%   Weight:  63 6 kg (140 lb 3 4 oz)     Height:           Intake/Output Summary (Last 24 hours) at 11/22/17 1101  Last data filed at 11/22/17 0747   Gross per 24 hour   Intake             1407 ml   Output             1800 ml   Net             -393 ml     General:  Awake no acute distress  HEENT:  Dry and pale  Neck:  Supple  Skin:  No rash  Cardiovascular:  S1-S2 appreciated  Lungs:  Clear  Abdomen:  Nontender  Extremities:  No edema  Neurologic:  Lethargic    Medications:    Current Facility-Administered Medications:    acetaminophen (TYLENOL) tablet 650 mg, 650 mg, Per PEG Tube, Q4H PRN, Alexey Oden MD, 650 mg at 11/18/17 1133    bisacodyl (DULCOLAX) rectal suppository 10 mg, 10 mg, Rectal, PRN, Alexey Oden MD    calcium acetate (PHOSLO) capsule 667 mg, 667 mg, Oral, TID With Meals, Maria Del Rosario K Tiarra, DO, 667 mg at 11/22/17 0732    carbidopa-levodopa (SINEMET)  mg per tablet 1 tablet, 1 tablet, Per PEG Tube, TID, Alexey Oden MD, 1 tablet at 11/22/17 0849    cefepime (MAXIPIME) 1,000 mg in dextrose 5 % 50 mL IVPB, 1,000 mg, Intravenous, Q24H, Kt Szymanski MD, Last Rate: 100 mL/hr at 11/21/17 1556, 1,000 mg at 11/21/17 1556    doxycycline hyclate (VIBRAMYCIN) capsule 100 mg, 100 mg, Per PEG Tube, BID, Alexey Oden MD, 100 mg at 11/22/17 0848    guaiFENesin (ROBITUSSIN) oral solution 200 mg, 200 mg, Per PEG Tube, Q6H, Joyice Hunger, DO, 200 mg at 11/22/17 0732    insulin glargine (LANTUS) subcutaneous injection 38 Units, 38 Units, Subcutaneous, HS, Joyice Hunger, DO, 38 Units at 11/21/17 2202    insulin lispro (HumaLOG) 100 units/mL subcutaneous injection 2-12 Units, 2-12 Units, Subcutaneous, Q6H, Joyice Hunger, DO, 6 Units at 11/22/17 0114    ipratropium (ATROVENT HFA) inhaler 2 puff, 2 puff, Inhalation, TID, Madalyn Angulo MD, 2 puff at 11/22/17 0849    lacosamide (VIMPAT) 10 mg/mL oral solution 100 mg, 100 mg, Per PEG Tube, BID, Alexey Oden MD, 100 mg at 11/22/17 0849    levothyroxine tablet 37 5 mcg, 37 5 mcg, Per PEG Tube, HS, Alexey Oden MD, 37 5 mcg at 11/21/17 2202    metoprolol tartrate (LOPRESSOR) tablet 25 mg, 25 mg, Per PEG Tube, BID, Alexey Oden MD, 25 mg at 11/22/17 0849    miconazole 2 % cream, , Topical, BID, Alexey Oden MD    midodrine (PROAMATINE) tablet 5 mg, 5 mg, Per PEG Tube, TID, Alexey Oden MD, 5 mg at 11/22/17 0849    ondansetron (ZOFRAN) injection 4 mg, 4 mg, Intravenous, Q6H PRN, Alexey Oden MD    sodium hypochlorite (HYSEPT) 0 25 % topical solution 1 application, 1 application, Irrigation, TID, Lion Heller MD, 1 application at 97/73/68 2044    traMADol (ULTRAM) tablet 25 mg, 25 mg, Per G Tube, Q6H PRN, Shaq Rosado DO, 25 mg at 11/21/17 1623    Laboratory Results:    Results from last 7 days  Lab Units 11/21/17  0739 11/20/17  0616 11/19/17  0729 11/17/17  1135 11/17/17  0730 11/16/17  0520   WBC Thousand/uL 28 20* 26 45* 25 57* 19 32* 20 55*  --    HEMOGLOBIN g/dL 9 0* 9 4* 9 7* 6 6* 7 4*  --    HEMATOCRIT % 28 0* 29 8* 31 1* 22 0* 24 8*  --    PLATELETS Thousands/uL 241 271 322 267 282  --    SODIUM mmol/L  --  131* 126*  --  132* 140   POTASSIUM mmol/L  --  3 5 3 8  --  5 2 5 1   CHLORIDE mmol/L  --  92* 89*  --  93* 100   CO2 mmol/L  --  30 26  --  29 31   BUN mg/dL  --  37* 73*  --  48* 54*   CREATININE mg/dL  --  1 76* 2 63*  --  2 49* 3 49*   CALCIUM mg/dL  --  8 8 9 3  --  8 6 8 6   ALBUMIN g/dL  --   --   --   --   --  2 5*   TOTAL PROTEIN g/dL  --   --   --   --   --  6 5   GLUCOSE RANDOM mg/dL  --  164* 215*  --  358* 149*

## 2017-11-22 NOTE — SOCIAL WORK
Pt discussed in Care Coordination rounds  Pt is set up for 6PM p/u [facility] BLS to return to [facility]  CM informed , Rn, and facility  CM also informed  of [facility] denial of pt to transfer centers  CM discussed this with both  and [facility], to have f/u from the Center to continue trying to switch from [facility] to [facility]   would like to speak with Nephrology and Red Surgery prior to d/c   CM paged both nephrology and red surgery to inform them

## 2017-11-22 NOTE — DISCHARGE SUMMARY
Discharge Summary - [facility] Internal Medicine    Patient Information: Macrina Rollins 68 y o  female MRN: [id number]  Unit/Bed#: [facility] 816-01 Encounter: [id number]    Discharging Physician / Practitioner: Helen Barnett MD  PCP: Barb Guerrier DO  Admission Date: 11/15/2017  Discharge Date: 11/22/17    Reason for Admission:  Left buttock pressure ulcer, SIR's    Discharge Diagnoses:   · SIRS vs sepsis POA  · Chronic stage IV sacral decubitus ulcer POA  · Asymptomatic pyuria  · AV graft site bleed  · Anemia of chronic disease/probable acute blood loss anemia  · Type 2 diabetes with hyperglycemia  · End-stage renal disease on dialysis  · Acute encephalopathy  · Dysphagia/peg tube in place pre hospital  · Functional quadriplegia  · Pulmonary edema    Consultations During Hospital Stay:  · General surgery  · Infectious Disease  · Nephrology  · Vascular surgery    Procedures Performed:   · Hemodialysis    Significant findings:  · None    Hospital Course:   Macrina Rollins is a 68 y o  female with extensive past medical history which includes anemia, lupus, CKD, DVT,ESRD, diabetes insulin-dependent, hypertension, atrial fibrillation, peripheral arterial disease, parkinsonian disease who originally presented to the hospital on 11/15/2017 due to bleeding of the AV fistula site  Patient required vitamin K as well as FFP to control bleeding  At presentation, she was found to have an elevated white count at 23  It was unclear the source of infection however following extensive review and workup, it was felt that the likely source was sacral decubitus ulcer  Blood cultures were negative, stool for C diff was negative and urine cultures were also negative  She completed a 7 day course of cefepime per ID recommendations  She was evaluated by General surgery for sacral decubitus ulcer and did have bedside wound debridement with continued local dressing    She was also evaluated by vascular surgery due to AV fistula bleeding and was felt her bleeding was likely secondary to anticoagulation  Coumadin was discontinued over the course of the hospital stay but she was resumed on aspirin at discharge  Patient was at her baseline and no further inpatient hospital needs were identified  She was cleared for discharge back to her nursing facility on 11/22/17  Condition at Discharge: stable     Discharge Day Visit / Exam:     Subjective:  No acute overnight events    Vitals: Blood Pressure: 143/64 (11/22/17 1100)  Pulse: 69 (11/22/17 1100)  Temperature: 97 8 °F (36 6 °C) (11/22/17 1100)  Temp Source: Oral (11/22/17 1100)  Respirations: 16 (11/22/17 1100)  Height: 5' 2" (157 5 cm) (11/15/17 0100)  Weight - Scale: 63 6 kg (140 lb 3 4 oz) (11/22/17 0551)  SpO2: 96 % (11/22/17 1100)    General Appearance:    Chronically ill-appearing, opens eyes to call, no acute distress   Head:    Normocephalic, without obvious abnormality, atraumatic, mucous membranes moist    Eyes:    Conjunctiva/corneas clear, EOM's intact   Neck:   Supple   Lungs:     Clear to auscultation bilaterally, respirations unlabored, no crackles or wheeze     Heart:    Regular rate and rhythm, S1 and S2, +SM    Abdomen:     Soft, non-tender, peg tube in place, site intact   Extremities:   Bilateral upper extremity contractures, no edema   Neurologic:  Functional quadriplegia      Discharge instructions/Information to patient and family:   See after visit summary for information provided to patient and family  Provisions for Follow-Up Care:  See after visit summary for information related to follow-up care and any pertinent home health orders  Disposition: Liliya Ulloa    Updated patient's son Krista Person at discharge on phone  All questions answered and concerns addressed  He requested a copy of the discharge instructions to be sent with the patient for him and this was conveyed to patient's nurse      Discharge Statement:  I spent >45 minutes discharging the patient  This time was spent on the day of discharge  I had direct contact with the patient on the day of discharge  Greater than 50% of the total time was spent examining patient, answering all patient questions, arranging and discussing plan of care with patient as well as directly providing post-discharge instructions  Additional time then spent on discharge activities  Discharge Medications:  See after visit summary for reconciled discharge medications provided to patient and family  ** Please Note: Dragon 360 Dictation voice to text software may have been used in the creation of this document   **

## 2017-11-22 NOTE — PROGRESS NOTES
Progress Note - Infectious Disease   Rashida Sheridan 68 y o  female MRN: [id number]  Unit/Bed#: [facility] 816-01 Encounter: [id number]      Impression/Recommendations:  1   SIRS versus sepsis  POA:  Leukocytosis and tachycardia  Evolving fever concerning for evolving infection although unclear source  Blood cultures, C  Diff, CT C/A/P negative  Urine culture negative but was on antibiotics on admission (Doxycycline)  Sacral wound not acutely infected  Hemodynamically stable and non-toxic  Rising WBC likely due to recent steroids for CT prep  Clinically improved status post 7 days of empirical cefepime  Rec:       · Discontinue cefepime today to complete treatment course  · Follow temperatures closely  · Supportive care as per the primary service     2   Acute encephalopathy  Suspect multifactorial, ?toxic-metabolic  Suspect underlying baseline cognitive dysfunction  Improved, ?role of antibiotics  Rec:  · Follow mental status closely  · Continue supportive care as per the primary service     3   Chronic stage IV sacral decubitus ulcer  No evidence for acute purulence or cellulitis  Consider chronic osteomyelitis given exposed bone  Status post bedside debridement per surgery  Rec:       · Continue LWC and offloading as possible per surgery  · No role for prolonged IV antibiotics given no options for bone coverage  · Agree that a poor candidate for flap surgery     4   ESRD on HD  Via RUE AVG     5   History of endocarditis  Recently treated at OSH  Rec:  · Continue doxycycline suppression     6  Disposition  Patient with multiple advanced comorbidities and questionable quality of life  Patient and  have been resistant palliative care consultation  Patient worsens and/or returns to the hospital would reconsider strongly      The patient is stable from an ID standpoint for D/C today although certainly remains high risk for further infection and/or we hospitalization due to all of above      Antibiotics:  Cefepime #7  (Doxycycline)    Subjective:  Patient seen on AM rounds  No new events overnight  Denies fevers, chills, or sweats  Denies nausea, vomiting, or diarrhea  Objective:  Vitals:  HR:  [60-75] 69  Resp:  [16-18] 16  BP: (106-147)/(34-66) 143/64  SpO2:  [95 %-100 %] 96 %  Temp (24hrs), Av 8 °F (36 6 °C), Min:97 6 °F (36 4 °C), Max:98 3 °F (36 8 °C)  Current: Temperature: 97 8 °F (36 6 °C)    Physical Exam:   General:  No acute distress  Psychiatric:  Awake and alert  Pulmonary:  Normal respiratory excursion without accessory muscle use  Abdomen:  Soft, nontender  Extremities:  No edema  Skin:  No rashes    Lab Results:  I have personally reviewed pertinent labs  Results from last 7 days  Lab Units 17  0616 17  0729 17  0730 17  0520   SODIUM mmol/L 131* 126* 132* 140   POTASSIUM mmol/L 3 5 3 8 5 2 5 1   CHLORIDE mmol/L 92* 89* 93* 100   CO2 mmol/L 30 26 29 31   ANION GAP mmol/L 9 11 10 9   BUN mg/dL 37* 73* 48* 54*   CREATININE mg/dL 1 76* 2 63* 2 49* 3 49*   EGFR ml/min/1 73sq m 28 17 18 12   GLUCOSE RANDOM mg/dL 164* 215* 358* 149*   CALCIUM mg/dL 8 8 9 3 8 6 8 6   AST U/L  --   --   --  46*   ALT U/L  --   --   --  9*   ALK PHOS U/L  --   --   --  117*   TOTAL PROTEIN g/dL  --   --   --  6 5   ALBUMIN g/dL  --   --   --  2 5*   BILIRUBIN TOTAL mg/dL  --   --   --  0 77       Results from last 7 days  Lab Units 17  0739 17  0616 17  0729   WBC Thousand/uL 28 20* 26 45* 25 57*   HEMOGLOBIN g/dL 9 0* 9 4* 9 7*   PLATELETS Thousands/uL 241 271 322       Results from last 7 days  Lab Units 17  2115 17  1004 17  1003   BLOOD CULTURE   --  No Growth After 5 Days  No Growth After 5 Days  C DIFF TOXIN B  NEGATIVE for C difficle toxin by PCR    --   --        Imaging Studies:   I have personally reviewed pertinent imaging study reports and images in PACS  EKG, Pathology, and Other Studies:   I have personally reviewed pertinent reports

## 2017-11-22 NOTE — DISCHARGE INSTRUCTIONS
Sacral Wound Care:  - Please pack sacral wound twice with a moist to dry Kerlix gauze dressing and cover with a dry gauze dressing and abdominal pad  - The patient should be repositioned at least every 2 hours to maintain off-loading off of her wound as well as prevent further pressure ulcer development in other locations! - Please make sure to wash the markie wound skin with soap and water during its dressing changed otherwise keep the markie wound skin as dry as possible  You may use topical skin protectant such as calazime on the periwound skin

## 2017-11-22 NOTE — PROGRESS NOTES
Rounded with Dr Maryan Davies with [facility]  Pt to be d/c to nursing facility after completion of IV abx

## 2017-12-08 ENCOUNTER — APPOINTMENT (INPATIENT)
Dept: DIALYSIS | Facility: HOSPITAL | Age: 77
DRG: 871 | End: 2017-12-08
Payer: MEDICARE

## 2017-12-08 ENCOUNTER — HOSPITAL ENCOUNTER (INPATIENT)
Facility: HOSPITAL | Age: 77
LOS: 3 days | Discharge: NURSING, REHAB OR LONG-TERM CARE | DRG: 871 | End: 2017-12-11
Attending: EMERGENCY MEDICINE | Admitting: INTERNAL MEDICINE
Payer: MEDICARE

## 2017-12-08 ENCOUNTER — APPOINTMENT (EMERGENCY)
Dept: RADIOLOGY | Facility: HOSPITAL | Age: 77
DRG: 871 | End: 2017-12-08
Payer: MEDICARE

## 2017-12-08 DIAGNOSIS — A41.9 SEPSIS, DUE TO UNSPECIFIED ORGANISM: Primary | ICD-10-CM

## 2017-12-08 DIAGNOSIS — IMO0002 DM (DIABETES MELLITUS), SECONDARY UNCONTROLLED: ICD-10-CM

## 2017-12-08 DIAGNOSIS — J18.9 HEALTHCARE-ASSOCIATED PNEUMONIA: ICD-10-CM

## 2017-12-08 DIAGNOSIS — N18.6 ESRD (END STAGE RENAL DISEASE) ON DIALYSIS (HCC): ICD-10-CM

## 2017-12-08 DIAGNOSIS — L89.154 DECUBITUS ULCER OF SACRAL REGION, STAGE 4 (HCC): ICD-10-CM

## 2017-12-08 DIAGNOSIS — Z99.2 ESRD (END STAGE RENAL DISEASE) ON DIALYSIS (HCC): ICD-10-CM

## 2017-12-08 PROBLEM — Z79.4 TYPE 2 DIABETES MELLITUS WITH HYPERGLYCEMIA, WITH LONG-TERM CURRENT USE OF INSULIN (HCC): Status: ACTIVE | Noted: 2017-12-08

## 2017-12-08 PROBLEM — E11.65 TYPE 2 DIABETES MELLITUS WITH HYPERGLYCEMIA, WITH LONG-TERM CURRENT USE OF INSULIN (HCC): Status: ACTIVE | Noted: 2017-12-08

## 2017-12-08 LAB
ABO GROUP BLD: NORMAL
ALBUMIN SERPL BCP-MCNC: 2.5 G/DL (ref 3.5–5)
ALP SERPL-CCNC: 268 U/L (ref 46–116)
ALT SERPL W P-5'-P-CCNC: 6 U/L (ref 12–78)
ANION GAP SERPL CALCULATED.3IONS-SCNC: 14 MMOL/L (ref 4–13)
ANISOCYTOSIS BLD QL SMEAR: PRESENT
ANTIBODY ID. #3: NORMAL
ARTERIAL PATENCY WRIST A: ABNORMAL
AST SERPL W P-5'-P-CCNC: 22 U/L (ref 5–45)
BASE EXCESS BLDA CALC-SCNC: 3 MMOL/L (ref -2–3)
BASOPHILS # BLD MANUAL: 0 THOUSAND/UL (ref 0–0.1)
BASOPHILS NFR MAR MANUAL: 0 % (ref 0–1)
BILIRUB SERPL-MCNC: 0.4 MG/DL (ref 0.2–1)
BLD GP AB SCN SERPL QL: POSITIVE
BLOOD GROUP ANTIBODIES SERPL: NORMAL
BLOOD GROUP ANTIBODIES SERPL: NORMAL
BUN SERPL-MCNC: 75 MG/DL (ref 5–25)
CALCIUM SERPL-MCNC: 10.8 MG/DL (ref 8.3–10.1)
CHLORIDE SERPL-SCNC: 87 MMOL/L (ref 100–108)
CO2 SERPL-SCNC: 26 MMOL/L (ref 21–32)
CREAT SERPL-MCNC: 3.57 MG/DL (ref 0.6–1.3)
EOSINOPHIL # BLD MANUAL: 0.24 THOUSAND/UL (ref 0–0.4)
EOSINOPHIL NFR BLD MANUAL: 1 % (ref 0–6)
ERYTHROCYTE [DISTWIDTH] IN BLOOD BY AUTOMATED COUNT: 18 % (ref 11.6–15.1)
FIO2 GAS DIL.REBREATH: 21 L
FLUAV AG SPEC QL: NORMAL
FLUBV AG SPEC QL: NORMAL
GFR SERPL CREATININE-BSD FRML MDRD: 12 ML/MIN/1.73SQ M
GLUCOSE SERPL-MCNC: 175 MG/DL (ref 65–140)
GLUCOSE SERPL-MCNC: 266 MG/DL (ref 65–140)
GLUCOSE SERPL-MCNC: 338 MG/DL (ref 65–140)
GLUCOSE SERPL-MCNC: 371 MG/DL (ref 65–140)
GLUCOSE SERPL-MCNC: 381 MG/DL (ref 65–140)
HCO3 BLDA-SCNC: 25.8 MMOL/L (ref 22–28)
HCT VFR BLD AUTO: 23.5 % (ref 34.8–46.1)
HGB BLD-MCNC: 7.1 G/DL (ref 11.5–15.4)
LACTATE SERPL-SCNC: 0.9 MMOL/L (ref 0.5–2)
LACTATE SERPL-SCNC: 2.8 MMOL/L (ref 0.5–2)
LYMPHOCYTES # BLD AUTO: 14 % (ref 14–44)
LYMPHOCYTES # BLD AUTO: 3.33 THOUSAND/UL (ref 0.6–4.47)
MCH RBC QN AUTO: 26.8 PG (ref 26.8–34.3)
MCHC RBC AUTO-ENTMCNC: 30.2 G/DL (ref 31.4–37.4)
MCV RBC AUTO: 89 FL (ref 82–98)
METAMYELOCYTES NFR BLD MANUAL: 1 % (ref 0–1)
MICROCYTES BLD QL AUTO: PRESENT
MONOCYTES # BLD AUTO: 2.62 THOUSAND/UL (ref 0–1.22)
MONOCYTES NFR BLD: 11 % (ref 4–12)
NEUTROPHILS # BLD MANUAL: 17.13 THOUSAND/UL (ref 1.85–7.62)
NEUTS SEG NFR BLD AUTO: 72 % (ref 43–75)
NRBC BLD AUTO-RTO: 6 /100 WBC (ref 0–2)
PCO2 BLD: 27 MMOL/L (ref 21–32)
PCO2 BLD: 33.7 MM HG (ref 36–44)
PH BLD: 7.49 [PH] (ref 7.35–7.45)
PLATELET # BLD AUTO: 341 THOUSANDS/UL (ref 149–390)
PLATELET BLD QL SMEAR: ADEQUATE
PMV BLD AUTO: 9.2 FL (ref 8.9–12.7)
PO2 BLD: 62 MM HG (ref 75–129)
POLYCHROMASIA BLD QL SMEAR: PRESENT
POTASSIUM SERPL-SCNC: 4.5 MMOL/L (ref 3.5–5.3)
PROT SERPL-MCNC: 7.3 G/DL (ref 6.4–8.2)
RBC # BLD AUTO: 2.65 MILLION/UL (ref 3.81–5.12)
RBC MORPH BLD: PRESENT
RH BLD: NEGATIVE
RSV B RNA SPEC QL NAA+PROBE: NORMAL
SAMPLE SITE: ABNORMAL
SAO2 % BLD FROM PO2: 93 % (ref 95–98)
SODIUM SERPL-SCNC: 127 MMOL/L (ref 136–145)
SPECIMEN EXPIRATION DATE: NORMAL
SPECIMEN SOURCE: ABNORMAL
TOTAL CELLS COUNTED SPEC: 100
VARIANT LYMPHS # BLD AUTO: 1 %
WBC # BLD AUTO: 23.79 THOUSAND/UL (ref 4.31–10.16)

## 2017-12-08 PROCEDURE — 82948 REAGENT STRIP/BLOOD GLUCOSE: CPT

## 2017-12-08 PROCEDURE — 86921 COMPATIBILITY TEST INCUBATE: CPT

## 2017-12-08 PROCEDURE — 86850 RBC ANTIBODY SCREEN: CPT | Performed by: EMERGENCY MEDICINE

## 2017-12-08 PROCEDURE — 96360 HYDRATION IV INFUSION INIT: CPT

## 2017-12-08 PROCEDURE — 99285 EMERGENCY DEPT VISIT HI MDM: CPT

## 2017-12-08 PROCEDURE — 86922 COMPATIBILITY TEST ANTIGLOB: CPT

## 2017-12-08 PROCEDURE — 93005 ELECTROCARDIOGRAM TRACING: CPT | Performed by: EMERGENCY MEDICINE

## 2017-12-08 PROCEDURE — 83605 ASSAY OF LACTIC ACID: CPT | Performed by: INTERNAL MEDICINE

## 2017-12-08 PROCEDURE — 96374 THER/PROPH/DIAG INJ IV PUSH: CPT

## 2017-12-08 PROCEDURE — 85007 BL SMEAR W/DIFF WBC COUNT: CPT | Performed by: EMERGENCY MEDICINE

## 2017-12-08 PROCEDURE — 36415 COLL VENOUS BLD VENIPUNCTURE: CPT | Performed by: EMERGENCY MEDICINE

## 2017-12-08 PROCEDURE — 85027 COMPLETE CBC AUTOMATED: CPT | Performed by: EMERGENCY MEDICINE

## 2017-12-08 PROCEDURE — 94762 N-INVAS EAR/PLS OXIMTRY CONT: CPT

## 2017-12-08 PROCEDURE — 82803 BLOOD GASES ANY COMBINATION: CPT

## 2017-12-08 PROCEDURE — 86901 BLOOD TYPING SEROLOGIC RH(D): CPT | Performed by: EMERGENCY MEDICINE

## 2017-12-08 PROCEDURE — 86870 RBC ANTIBODY IDENTIFICATION: CPT | Performed by: EMERGENCY MEDICINE

## 2017-12-08 PROCEDURE — 87040 BLOOD CULTURE FOR BACTERIA: CPT | Performed by: EMERGENCY MEDICINE

## 2017-12-08 PROCEDURE — 71010 HB CHEST X-RAY 1 VIEW FRONTAL (PORTABLE): CPT

## 2017-12-08 PROCEDURE — 80053 COMPREHEN METABOLIC PANEL: CPT | Performed by: EMERGENCY MEDICINE

## 2017-12-08 PROCEDURE — 36600 WITHDRAWAL OF ARTERIAL BLOOD: CPT

## 2017-12-08 PROCEDURE — 87798 DETECT AGENT NOS DNA AMP: CPT | Performed by: INTERNAL MEDICINE

## 2017-12-08 PROCEDURE — 86900 BLOOD TYPING SEROLOGIC ABO: CPT | Performed by: EMERGENCY MEDICINE

## 2017-12-08 PROCEDURE — 83605 ASSAY OF LACTIC ACID: CPT | Performed by: EMERGENCY MEDICINE

## 2017-12-08 RX ORDER — LEVOTHYROXINE SODIUM 0.07 MG/1
75 TABLET ORAL
Status: DISCONTINUED | OUTPATIENT
Start: 2017-12-08 | End: 2017-12-08

## 2017-12-08 RX ORDER — LEVOTHYROXINE SODIUM 0.07 MG/1
37.5 TABLET ORAL
Status: DISCONTINUED | OUTPATIENT
Start: 2017-12-08 | End: 2017-12-12 | Stop reason: HOSPADM

## 2017-12-08 RX ORDER — LOPERAMIDE HYDROCHLORIDE 2 MG/1
2 TABLET ORAL
Status: ON HOLD | COMMUNITY
End: 2017-12-08

## 2017-12-08 RX ORDER — NITROGLYCERIN 0.4 MG/1
0.4 TABLET SUBLINGUAL
Status: DISCONTINUED | OUTPATIENT
Start: 2017-12-08 | End: 2017-12-12 | Stop reason: HOSPADM

## 2017-12-08 RX ORDER — LOPERAMIDE HYDROCHLORIDE 2 MG/1
2 CAPSULE ORAL DAILY
Status: DISCONTINUED | OUTPATIENT
Start: 2017-12-08 | End: 2017-12-08

## 2017-12-08 RX ORDER — WARFARIN SODIUM 5 MG/1
TABLET ORAL
Status: ON HOLD | COMMUNITY
End: 2017-12-08

## 2017-12-08 RX ORDER — HYDROXYCHLOROQUINE SULFATE 200 MG/1
200 TABLET, FILM COATED ORAL
Status: DISCONTINUED | OUTPATIENT
Start: 2017-12-08 | End: 2017-12-08

## 2017-12-08 RX ORDER — NYSTATIN 100000 U/G
1 CREAM TOPICAL 2 TIMES DAILY
Status: DISCONTINUED | OUTPATIENT
Start: 2017-12-08 | End: 2017-12-12 | Stop reason: HOSPADM

## 2017-12-08 RX ORDER — MAGNESIUM OXIDE/MAG AA CHELATE 133 MG
1 TABLET ORAL 2 TIMES DAILY
Status: DISCONTINUED | OUTPATIENT
Start: 2017-12-08 | End: 2017-12-12 | Stop reason: HOSPADM

## 2017-12-08 RX ORDER — NITROGLYCERIN 0.4 MG/1
0.4 TABLET SUBLINGUAL
COMMUNITY

## 2017-12-08 RX ORDER — SODIUM HYPOCHLORITE 2.5 MG/ML
1 SOLUTION TOPICAL 3 TIMES DAILY
Status: DISCONTINUED | OUTPATIENT
Start: 2017-12-08 | End: 2017-12-12 | Stop reason: HOSPADM

## 2017-12-08 RX ORDER — LEVOTHYROXINE SODIUM 0.07 MG/1
37.5 TABLET ORAL
COMMUNITY

## 2017-12-08 RX ORDER — TRAMADOL HYDROCHLORIDE 50 MG/1
25 TABLET ORAL EVERY 6 HOURS PRN
Status: DISCONTINUED | OUTPATIENT
Start: 2017-12-08 | End: 2017-12-09

## 2017-12-08 RX ORDER — TRAMADOL HYDROCHLORIDE 50 MG/1
25 TABLET ORAL EVERY 6 HOURS PRN
COMMUNITY
End: 2017-12-11

## 2017-12-08 RX ORDER — PANTOPRAZOLE SODIUM 20 MG/1
20 TABLET, DELAYED RELEASE ORAL
Status: DISCONTINUED | OUTPATIENT
Start: 2017-12-08 | End: 2017-12-08

## 2017-12-08 RX ORDER — ASPIRIN 325 MG
325 TABLET, DELAYED RELEASE (ENTERIC COATED) ORAL DAILY
Status: DISCONTINUED | OUTPATIENT
Start: 2017-12-08 | End: 2017-12-08

## 2017-12-08 RX ORDER — GABAPENTIN 100 MG/1
100 CAPSULE ORAL
Status: DISCONTINUED | OUTPATIENT
Start: 2017-12-08 | End: 2017-12-08

## 2017-12-08 RX ORDER — ASPIRIN 81 MG/1
81 TABLET, CHEWABLE ORAL DAILY
COMMUNITY

## 2017-12-08 RX ORDER — INSULIN GLARGINE 100 [IU]/ML
38 INJECTION, SOLUTION SUBCUTANEOUS
Status: DISCONTINUED | OUTPATIENT
Start: 2017-12-08 | End: 2017-12-09

## 2017-12-08 RX ORDER — ONDANSETRON 2 MG/ML
4 INJECTION INTRAMUSCULAR; INTRAVENOUS EVERY 6 HOURS PRN
Status: DISCONTINUED | OUTPATIENT
Start: 2017-12-08 | End: 2017-12-12 | Stop reason: HOSPADM

## 2017-12-08 RX ORDER — HYDROXYCHLOROQUINE SULFATE 200 MG/1
200 TABLET, FILM COATED ORAL
Status: ON HOLD | COMMUNITY
End: 2017-12-08

## 2017-12-08 RX ORDER — FUROSEMIDE 40 MG/1
40 TABLET ORAL DAILY
COMMUNITY
End: 2017-12-11 | Stop reason: HOSPADM

## 2017-12-08 RX ORDER — TRAMADOL HYDROCHLORIDE 50 MG/1
50 TABLET ORAL EVERY 6 HOURS PRN
Status: DISCONTINUED | OUTPATIENT
Start: 2017-12-08 | End: 2017-12-08

## 2017-12-08 RX ORDER — LACOSAMIDE 10 MG/ML
100 SOLUTION ORAL 2 TIMES DAILY
Status: DISCONTINUED | OUTPATIENT
Start: 2017-12-08 | End: 2017-12-08

## 2017-12-08 RX ORDER — ASPIRIN 81 MG/1
81 TABLET, CHEWABLE ORAL DAILY
Status: DISCONTINUED | OUTPATIENT
Start: 2017-12-08 | End: 2017-12-12 | Stop reason: HOSPADM

## 2017-12-08 RX ORDER — GABAPENTIN 300 MG/1
100 CAPSULE ORAL
Status: ON HOLD | COMMUNITY
End: 2017-12-08

## 2017-12-08 RX ORDER — ASPIRIN 325 MG
325 TABLET ORAL DAILY
Status: ON HOLD | COMMUNITY
End: 2017-12-08

## 2017-12-08 RX ORDER — NICOTINE POLACRILEX 4 MG/1
20 GUM, CHEWING ORAL DAILY
Status: ON HOLD | COMMUNITY
End: 2017-12-08

## 2017-12-08 RX ORDER — HYOSCYAMINE SULFATE 16 OZ
1 SOLUTION MISCELLANEOUS 3 TIMES DAILY PRN
COMMUNITY
End: 2017-12-11 | Stop reason: HOSPADM

## 2017-12-08 RX ORDER — SODIUM POLYSTYRENE SULFONATE 15 G/60ML
15 SUSPENSION ORAL; RECTAL ONCE
Status: ON HOLD | COMMUNITY
End: 2017-12-08

## 2017-12-08 RX ADMIN — INSULIN HUMAN 6 UNITS: 100 INJECTION, SOLUTION PARENTERAL at 08:38

## 2017-12-08 RX ADMIN — INSULIN GLARGINE 38 UNITS: 100 INJECTION, SOLUTION SUBCUTANEOUS at 22:09

## 2017-12-08 RX ADMIN — CALCIUM ACETATE 667 MG: 667 CAPSULE ORAL at 13:38

## 2017-12-08 RX ADMIN — ASPIRIN 81 MG 81 MG: 81 TABLET ORAL at 17:35

## 2017-12-08 RX ADMIN — CARBIDOPA AND LEVODOPA 1 TABLET: 25; 100 TABLET ORAL at 22:08

## 2017-12-08 RX ADMIN — LEVOTHYROXINE SODIUM 37.5 MCG: 75 TABLET ORAL at 22:08

## 2017-12-08 RX ADMIN — INSULIN LISPRO 1 UNITS: 100 INJECTION, SOLUTION INTRAVENOUS; SUBCUTANEOUS at 17:52

## 2017-12-08 RX ADMIN — TRAMADOL HYDROCHLORIDE 25 MG: 50 TABLET, FILM COATED ORAL at 18:27

## 2017-12-08 RX ADMIN — SODIUM CHLORIDE 1000 ML: 0.9 INJECTION, SOLUTION INTRAVENOUS at 08:42

## 2017-12-08 RX ADMIN — HYOSCYAMINE SULFATE 1 APPLICATION: 16 SOLUTION at 22:10

## 2017-12-08 RX ADMIN — CALCIUM ACETATE 667 MG: 667 CAPSULE ORAL at 17:35

## 2017-12-08 RX ADMIN — CEFEPIME HYDROCHLORIDE 1000 MG: 1 INJECTION, SOLUTION INTRAVENOUS at 13:37

## 2017-12-08 RX ADMIN — CARBIDOPA AND LEVODOPA 1 TABLET: 25; 100 TABLET ORAL at 13:38

## 2017-12-08 RX ADMIN — CARBIDOPA AND LEVODOPA 1 TABLET: 25; 100 TABLET ORAL at 17:35

## 2017-12-08 RX ADMIN — HYOSCYAMINE SULFATE 1 APPLICATION: 16 SOLUTION at 17:56

## 2017-12-08 RX ADMIN — NYSTATIN 1 APPLICATION: 100000 CREAM TOPICAL at 13:37

## 2017-12-08 RX ADMIN — Medication 20 MG: at 17:35

## 2017-12-08 RX ADMIN — IRON SUCROSE 100 MG: 20 INJECTION, SOLUTION INTRAVENOUS at 16:09

## 2017-12-08 NOTE — ED NOTES
Patient resting in bed  Patient appears to be sleeping  Symmetrical chest rise, no facial grimace, and comfortable position noted        Bipin Caballero RN  12/08/17 3979

## 2017-12-08 NOTE — CASE MANAGEMENT
Initial Clinical Review    Admission: Date/Time/Statement: 12/8/17 @ 0921   ADMIT 11/15/2017 - 11/22/2017  Pressure ulcer of left buttock, unstageable     Orders Placed This Encounter   Procedures    Inpatient Admission (expected length of stay for this patient is greater than two midnights)     Standing Status:   Standing     Number of Occurrences:   1     Order Specific Question:   Admitting Physician     Answer:   Maverick Wagner [[id number]]     Order Specific Question:   Level of Care     Answer:   Med Surg [[id number]]     Order Specific Question:   Estimated length of stay     Answer:   More than 2 Midnights     Order Specific Question:   Certification     Answer:   I certify that inpatient services are medically necessary for this patient for a duration of greater than two midnights  See H&P and MD Progress Notes for additional information about the patient's course of treatment  ED: Date/Time/Mode of Arrival:   ED Arrival Information     Expected Arrival Acuity Means of Arrival Escorted By Service Admission Type    - 12/8/2017 08:07 Urgent Ambulance [address] Paramedics General Medicine Urgent    Arrival Complaint    hyperglycemia          Chief Complaint:   Chief Complaint   Patient presents with    Hyperglycemia - Symptomatic     Patient presents via ambulande from Dialysis  Patient's BG was 469 at dialysis this am, BG was 520 in ambulance  Dialysis staff also reports patient is less responsive then normal         History of Illness: 68 y o  female who presents with altered mental status with acute encephalopathy this morning when she presented there for dialysis  She had an elevated blood sugar over 400 at the nursing home before she went to dialysis and when she was unresponsive she was sent to the ER for evaluation    Patient normally dialyzes Q Monday Wednesday Friday and according to the  just had a partial dialysis because she was complaining of pain in her sacral decubitus and they stopped the treatment after approximately 2 hours  Patient is currently obtunded and with noxious stimuli will briefly open her eyes but is nonverbal   According to the  she has good days where she will answer all his questions  He denies that she has underlying dementia  When I discuss code status she is a DNR level 3  I did discuss the possibility of hospice with him which he was upset about because he reported that he had been AS that when she was at [facility] and again 2 weeks ago at [facility] and she he reports that he discussed it with the patient and she wishes to still continue with treatment including dialysis      Patient has multiple medication allergies and was listed as being on cefepime for her sacral decubitus at [facility] during her recent hospitalization in November  He is not aware that she has had any increased cough but chest x-ray does show a left-sided infiltrate  ED Vital Signs:   ED Triage Vitals   Temperature Pulse Respirations Blood Pressure SpO2   12/08/17 1039 12/08/17 0811 12/08/17 0811 12/08/17 0811 12/08/17 0900   98 3 °F (36 8 °C) 67 (!) 29 (!) 194/79 100 %      Temp Source Heart Rate Source Patient Position - Orthostatic VS BP Location FiO2 (%)   12/08/17 1039 12/08/17 0811 12/08/17 0811 12/08/17 0811 --   Oral Monitor Lying Left arm       Pain Score       12/08/17 0811       No Pain        Wt Readings from Last 1 Encounters:   12/08/17 65 kg (143 lb 4 8 oz)       Vital Signs (abnormal):  Respiratory rate 25 - 29  /79 - 185/70    Abnormal Labs/Diagnostic Test Results:   Wbc 23 79, hgb 7 1, hct 23 5  Lactic  Acid 2 8  Na 127  Cl 87  Anion gap 14  Bun 75  Creatinine 3 57  Glucose 381  Calcium 10 8  Alkaline phosphatase 268  Albumin 2 5  CxR- Mild ill-defined left perihilar infiltrate which might represent asymmetric edema or pneumonia  ED Treatment: blood cultures     Medication Administration from 12/08/2017 0807 to 12/08/2017 1017       Date/Time Order Dose Route Action Action by Comments     12/08/2017 0842 sodium chloride 0 9 % bolus 1,000 mL 1,000 mL Intravenous New Kwaku Teague RN      12/08/2017 8642 insulin regular (HumuLIN R,NovoLIN R) injection 6 Units 6 Units Intravenous Given David Gary RN           Past Medical/Surgical History:    Active Ambulatory Problems     Diagnosis Date Noted    Lupus 05/21/2017    Hyperlipidemia 05/21/2017    Hypertension 05/21/2017    Anemia 05/21/2017    Parkinson's disease ([facility] ) 05/22/2017    Pacemaker 05/22/2017    Sepsis ([facility] ) 05/22/2017    Type 2 diabetes mellitus with renal manifestations ([facility] ) 05/22/2017    ESRD (end stage renal disease) on dialysis ([facility] ) 05/22/2017    Acute respiratory failure with hypoxia ([facility] ) 05/22/2017    Pressure ulcer of left buttock, unstageable ([facility] ) 05/22/2017    Paroxysmal atrial fibrillation ([facility] ) 05/22/2017    Hemod[facility] catheter infection ([facility] ) 05/24/2017    Dermatitis associated with moisture 05/22/2017    Chronic diastolic congestive heart failure ([address] ) 05/30/2017    Toxic metabolic encephalopathy 16/91/0913    Infectious endocarditis 06/01/2017    Acute cystitis 11/15/2017    Functional quadriplegia ([facility] ) 11/18/2017    Decubitus ulcer of sacral region, stage 4 ([facility] ) 11/19/2017    Pyuria 11/19/2017    Oropharyngeal dysphagia 11/19/2017    Hyponatremia 11/19/2017     Resolved Ambulatory Problems     Diagnosis Date Noted    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 ([facility] ) 11/15/2017     Past Medical History:   Diagnosis Date    Anemia     CHF (congestive heart failure) ([facility] )     Diabetes mellitus ([facility] )     DVT (deep venous thrombosis) ([address] )     ESRD (end stage renal disease) on dialysis ([address] )     Hypercoagulable state ([facility] )     Hyperlipidemia     Hypertension     Hypothyroidism     Lupus     Pacemaker     Parkinson's disease ([address] )     Pneumonia     Psychiatric disorder     UTI (urinary tract infection)     Vascular dialysis catheter in place [facility])        Admitting Diagnosis: Hyperglycemia [R73 9]  Healthcare-associated pneumonia [J18 9]  DM (diabetes mellitus), secondary uncontrolled ([facility] ) [E13 65]  ESRD (end stage renal disease) on dialysis ([facility] ) [N18 6, Z99 2]  Sepsis, due to unspecified organism [facility]) [A41 9]    Age/Sex: 68 y o  female    Assessment/Plan: Healthcare associated pneumonia in patient who resides at skilled nursing facilities and is on dialysis and has had multiple hospitalizations in the past 4 months-with her multiple med allergies will place her on cefepime which she apparently did tolerate while at [facility] 11/15 to 11/22 admission  Will consult Pulmonary  2  Sepsis-with pneumonia, tachypnea, elevated white count and positive elevation of lactate level  3  Sacral decubitus-will have General surgery wound care following-there was some consideration for eventual flap to be done for this according to   4  esrd on hd- will consult Nephrology-according to  had partial dialysis on Wednesday and was sent here for evaluation from dialysis center today and had not been started  5  Diabetes mellitus uncontrolled-sugars over 400  6  Acute encephalopathy-will do serial neuro checks  7  Dysphagia-chronic PEG feedings  8  Lupus patient/rheumatoid arthritis/functional quadriplegia  9  Parkinson's disease  10    Seizure disorder-as per  this was diagnosed at [facility] and then followed with team at [facility] within the past 4 months    Admission Orders: 12/8/2017  0921 INPATIENT   Scheduled Meds:   aspirin 325 mg Oral Daily   calcium acetate 667 mg Oral TID With Meals   carbidopa-levodopa 1 tablet Oral TID   cefepime 1,000 mg Intravenous Q24H   gabapentin 100 mg Oral HS   insulin glargine 38 Units Subcutaneous HS   levothyroxine 37 5 mcg Oral HS   magnesium (amino acid chelate) 1 tablet Oral BID   nystatin 1 application Topical BID   pantoprazole 20 mg Oral Early Morning   sodium hypochlorite 1 application Irrigation TID     Continuous Infusions:    PRN Meds: not used: nitroglycerin    ondansetron    traMADol    OTHER ORDERS: aspiration precautions  Continuous pulse oximetry  Sputum culture  Diet - enteral - nepro     Consult ID; pulmonary; nephrology; surgery  Hemodialysis MWF  Respiratory protocol - on room air  PT/OT

## 2017-12-08 NOTE — H&P
H&P Exam - Brielle Griffin 68 y o  female MRN: [id number]    Unit/Bed#: [address] [place] [address] 205-02 Encounter: [id number]      Assessment/Plan     1  Healthcare associated pneumonia in patient who resides at skilled nursing facilities and is on dialysis and has had multiple hospitalizations in the past 4 months-with her multiple med allergies will place her on cefepime which she apparently did tolerate while at [facility] 11/15 to 11/22 admission  Will consult Pulmonary  2  Sepsis-with pneumonia, tachypnea, elevated white count and positive elevation of lactate level  3  Sacral decubitus-will have General surgery wound care following-there was some consideration for eventual flap to be done for this according to   4  esrd on hd- will consult Nephrology-according to  had partial dialysis on Wednesday and was sent here for evaluation from dialysis center today and had not been started  5  Diabetes mellitus uncontrolled-sugars over 400  6  Acute encephalopathy-will do serial neuro checks  7  Dysphagia-chronic PEG feedings  8  Lupus patient/rheumatoid arthritis/functional quadriplegia  9  Parkinson's disease  10  Seizure disorder-as per  this was diagnosed at [facility] and then followed with team at [facility] within the past 4 months  History of Present Illness     HPI:  Brielle Griffin is a 68 y o  female who presents with altered mental status with acute encephalopathy this morning when she presented there for dialysis  She had an elevated blood sugar over 400 at the nursing home before she went to dialysis and when she was unresponsive she was sent to the ER for evaluation  Patient normally dialyzes Q Monday Wednesday Friday and according to the  just had a partial dialysis because she was complaining of pain in her sacral decubitus and they stopped the treatment after approximately 2 hours    Patient is currently obtunded and with noxious stimuli will briefly open her eyes but is nonverbal   According to the  she has good days where she will answer all his questions  He denies that she has underlying dementia  When I discuss code status she is a DNR level 3  I did discuss the possibility of hospice with him which he was upset about because he reported that he had been AS that when she was at [place] and again 2 weeks ago at [facility] and she he reports that he discussed it with the patient and she wishes to still continue with treatment including dialysis  Patient has multiple medication allergies and was listed as being on cefepime for her sacral decubitus at [facility] during her recent hospitalization in November  He is not aware that she has had any increased cough but chest x-ray does show a left-sided infiltrate    Review of Systems   Unable to perform ROS: Mental status change       Historical Information   Past Medical History:   Diagnosis Date    Anemia     CHF (congestive heart failure) ([place] [address] )     Diabetes mellitus ([place] [address] )     [facility]     ESRD (end stage renal disease) on dialysis ([place] [address] )     Hypercoagulable state ([place] [address] )     Factor 5 Leiden    Hyperlipidemia     Hypertension     Hypothyroidism     Lupus     Pacemaker     Parkinson's disease ([place] [address] )     Pneumonia     Psychiatric disorder     UTI (urinary tract infection)     Vascular dialysis catheter in [facility])      Past Surgical History:   Procedure Laterality Date    AMPUTATION      CARDIAC PACEMAKER PLACEMENT      CHOLECYSTECTOMY      HYSTERECTOMY      SPINAL FUSION       Social History   History   Alcohol Use No     History   Drug Use No     History   Smoking Status    Never Smoker   Smokeless Tobacco    Never Used     Family History:   Family History   Problem Relation Age of Onset    Heart disease Mother     Heart disease Father     Heart disease Brother        Meds/Allergies   PTA meds:   Prior to Admission Medications   Prescriptions Last Dose Informant Patient Reported? Taking?    Bisacodyl (BISAC-EVAC RE) 2017 at Unknown time  Yes Yes   Sig: Insert 10 mg into the rectum as needed     DAPTOMYCIN IV 2017 at Unknown time  Yes Yes   Sig: Infuse 625 mg into a venous catheter   Glucagon HCl, rDNA, (GLUCAGEN HYPOKIT IJ) 2017 at Unknown time  Yes Yes   Sig: Inject as directed   Lidocaine-Prilocaine, Bulk, 2 5-2 5 % CREA 2017 at Unknown time  Yes Yes   Sig: Apply to Right upper extremities toothpaste thickness - do not rub in- cover with telfa and wrap with eileen one hour prior to dialysis on dialysis days     Specialty Vitamins Products (MAGNESIUM, AMINO ACID CHELATE,) 133 MG tablet 2017 at Unknown time  Yes Yes   Sig: Take 1 tablet by mouth 2 (two) times a day   acetaminophen (TYLENOL) 325 mg tablet 2017 at Unknown time  No Yes   Si tablets by Per G Tube route every 4 (four) hours as needed for mild pain   calcium acetate (PHOSLO) 667 mg capsule 2017 at Unknown time  No Yes   Sig: Take 1 capsule by mouth 3 (three) times a day with meals   carbidopa-levodopa (SINEMET)  mg per tablet 2017 at Unknown time  Yes Yes   Sig: Take 1 tablet by mouth 3 (three) times a day   gabapentin (NEURONTIN) 300 mg capsule 2017 at Unknown time  Yes Yes   Sig: Take 100 mg by mouth daily at bedtime   hydroxychloroquine (PLAQUENIL) 200 mg tablet 2017 at Unknown time  Yes Yes   Sig: Take 200 mg by mouth daily at bedtime   insulin aspart (NovoLOG) 100 units/mL injection 2017 at Unknown time  Yes Yes   Sig: Inject 6 Units under the skin 3 (three) times a day before meals   insulin detemir (LEVEMIR) 100 units/mL subcutaneous injection 2017 at Unknown time  Yes Yes   Sig: Inject under the skin daily at bedtime   insulin glargine (LANTUS) 100 units/mL subcutaneous injection 2017 at Unknown time  No Yes   Sig: Inject 38 Units under the skin daily at bedtime   ipratropium (ATROVENT HFA) 17 mcg/act inhaler 2017 at Unknown time  Yes Yes   Sig: Inhale 1 puff every morning   lacosamide (VIMPAT) 10 mg/mL   No No   Sig: 10 mL by Per PEG Tube route 2 (two) times a day for 10 days   levothyroxine 75 mcg tablet 2017 at Unknown time  Yes Yes   Sig: Take 75 mcg by mouth daily at bedtime     loperamide (LOPERAMIDE A-D) 2 MG tablet 2017 at Unknown time  Yes Yes   Sig: Take 2 mg by mouth daily   magnesium hydroxide (MILK OF MAGNESIA) 400 mg/5 mL oral suspension 2017 at Unknown time  Yes Yes   Sig: Take by mouth daily as needed for constipation   metoprolol tartrate (LOPRESSOR) 25 mg tablet 2017 at Unknown time  No Yes   Si tablet by Per PEG Tube route 2 (two) times a day   metoprolol tartrate (LOPRESSOR) 25 mg tablet 2017 at Unknown time  Yes Yes   Sig: Take 25 mg by mouth every 12 (twelve) hours   nitroglycerin (NITROSTAT) 0 4 mg SL tablet 2017 at Unknown time  Yes Yes   Sig: Place 0 4 mg under the tongue every 5 (five) minutes as needed for chest pain   nystatin (MYCOSTATIN) cream 2017 at Unknown time  No Yes   Sig: Apply 2 g topically 2 (two) times a day   omeprazole (PriLOSEC) 20 mg delayed release capsule 2017 at Unknown time  Yes Yes   Sig: Take 20 mg by mouth daily   sodium hypochlorite (HYSEPT) 0 25 % 2017 at Unknown time  Yes Yes   Sig: Irrigate with 1 application as directed 3 (three) times a day   sodium polystyrene sulfonate (KAYEXALATE) 15 g/60 mL suspension 2017 at Unknown time  Yes Yes   Sig: Take 15 g by mouth once   traMADol (ULTRAM) 50 mg tablet 2017 at Unknown time  Yes Yes   Sig: Take 50 mg by mouth every 6 (six) hours as needed for moderate pain   warfarin (COUMADIN) 5 mg tablet 2017 at Unknown time  Yes Yes   Sig: Take by mouth daily      Facility-Administered Medications: None     Allergies   Allergen Reactions    Aminoglycosides     Aspirin     Aztreonam     Bicillin [Penicillin G Benzathine]     Doxycycline     Erythromycin     Erythromycin Base     Indomethacin     Iodinated Diagnostic Agents     Latex     Nevanac [Nepafenac]     Niacin     Other      Sodium, Betadine, Adhesive, Niacin, Indocin, SOUP    Oxytetracycline     Penicillins     Povidone      Other reaction(s): Other (See Comments)  Iodine, Betadine    Prednisone     Sulfa Antibiotics     Tetracyclines & Related     Vancomycin        Objective   Vitals: Blood pressure (!) 183/70, pulse 88, resp  rate (!) 25, height 5' 2" (1 575 m), weight 63 5 kg (140 lb), SpO2 100 %  No intake or output data in the 24 hours ending 12/08/17 1025    Invasive Devices     Peripheral Intravenous Line            Peripheral IV 12/08/17 Left Arm less than 1 day    Peripheral IV 12/08/17 Left Wrist less than 1 day          Line            Hemodialysis AV Fistula Right Other (Comment) -- days          Drain            Gastrostomy/Enterostomy PEG-jejunostomy LUQ -- days                Physical Exam   Constitutional: She appears distressed  Obtunded, encephalopathic, opens eyes briefly with noxious but is nonverbal both with me and her  asking her questions   HENT:   Head: Normocephalic and atraumatic  Eyes: Conjunctivae are normal  Right eye exhibits no discharge  Left eye exhibits no discharge  No scleral icterus  Neck: No tracheal deviation present  Thyromegaly present  Cardiovascular: Normal rate and regular rhythm  Murmur heard  Pacemaker noted on left subclavian area   Pulmonary/Chest: No respiratory distress  Decreased respiratory effort, decreased breath sounds   Abdominal: Soft  Bowel sounds are normal  She exhibits no distension  There is no tenderness  There is no rebound  Obese   Musculoskeletal: She exhibits deformity  Bilateral hand deformities greater on the left than the right with MCP swelling Has locked hand on the right   Neurological:   Nonverbal, no tremors noted no   spontaneous mobility  Of the upper lower extremities   Skin:   Large sacral decubitus with dressing in place   Psychiatric:   Unable to determine given the obtundation   Nursing note and vitals reviewed  Lab Results: I have personally reviewed pertinent reports  Imaging: I have personally reviewed pertinent reports  EKG, Pathology, and Other Studies: I have personally reviewed pertinent reports  Code Status: Prior  Advance Directive and Living Will:      Power of :    POLST:      Counseling / Coordination of Care  Total floor / unit time spent today 45 minutes  Greater than 50% of total time was spent with the patient and / or family counseling and / or coordination of care    A description of the counseling / coordination of care:  Review of old records and discussion at length with patient's

## 2017-12-08 NOTE — CONSULTS
Pulmonary Consultation   Heriberto Harry 68 y o  female MRN: [id number]  Unit/Bed#: [address] [place] [address] 205-02 Encounter: [id number]      Reason for consultation:  Healthcare associated pneumonia    Requesting physician:   Dr Nicolasa Mendez    Impressions:     1  Encephalopathy without signs of hypercapnia or hypoxia  Likely related to underlying chronic illness at superimposed uremia  · Await Nephrology input for dialysis    2  Abnormal chest x-ray with right perihilar density likely representing pulmonary edema secondary to missed dialysis treatments  The patient was started on empiric antibiotics for concern of healthcare associated pneumonia  Clinically she has no signs or symptoms of pneumonia  · I do not think that the patient needs further antibiotic treatments at this time  We can follow up on cultures  I will hold off on changing antibiotics however unti Infectious Disease consult is completed  History of Present Illness   HPI:  Heriberto Harry is a 68 y o  female who was sent to the emergency room for evaluation after coming to dialysis relatively unresponsive  On Wednesday she had partial dialysis because of sacral pain at site of decubitus ulceration  She only did her treatment for approximately 2 hours  She presented today with some encephalopathy and prior to any dialysis was sent to the emergency room for evaluation  Unfortunately the patient does not wake up and answer any questions and her history was obtained from the chart and her primary doctor  There is no documentation of coughing, fevers or shortness of breath  She has not been hypoxic hypercapnic since arrival     She was discharged from [place] at the end of November with concerns for bleeding AV fistula in which Coumadin was stopped, sepsis from sacral decubitus ulceration and pulmonary edema  Per history she is a lifelong nonsmoker and has no underlying lung disease      Review of systems:  Unobtainable as the patient is encephalopathic      Historical Information   Past Medical History:   Diagnosis Date    Anemia     CHF (congestive heart failure) ([place] [address] )     Diabetes mellitus ([place] [address] )     [facility]) [facility]     ESRD (end stage renal disease) on dialysis ([place] [address] )     Hypercoagulable state ([place] [address] )     Factor 5 Leiden    Hyperlipidemia     Hypertension     Hypothyroidism     Lupus     Pacemaker     Parkinson's disease ([place] [address] )     Pneumonia     Psychiatric disorder     UTI (urinary tract infection)     Vascular dialysis catheter in place [facility])      Past Surgical History:   Procedure Laterality Date    AMPUTATION      CARDIAC PACEMAKER PLACEMENT      CHOLECYSTECTOMY      HYSTERECTOMY      SPINAL FUSION       Family History   Problem Relation Age of Onset    Heart disease Mother     Heart disease Father     Heart disease Brother        Social History:  History   Smoking Status    Never Smoker   Smokeless Tobacco    Never Used     History   Alcohol Use No     History   Drug Use No     Marital Status: /Civil Union  Exercise History:   Functional quadriplegia per report    Meds/Allergies   Current Facility-Administered Medications   Medication Dose Route Frequency    aspirin chewable tablet 81 mg  81 mg Oral Daily    calcium acetate (PHOSLO) capsule 667 mg  667 mg Oral TID With Meals    carbidopa-levodopa (SINEMET)  mg per tablet 1 tablet  1 tablet Oral TID    cefepime (MAXIPIME) IVPB (premix) 1,000 mg  1,000 mg Intravenous Q24H    insulin glargine (LANTUS) subcutaneous injection 38 Units  38 Units Subcutaneous HS    insulin lispro (HumaLOG) 100 units/mL subcutaneous injection 1-6 Units  1-6 Units Subcutaneous Q6H [address]    levothyroxine tablet 37 5 mcg  37 5 mcg Oral HS    magnesium (amino acid chelate) tablet 1 tablet  1 tablet Oral BID    nitroglycerin (NITROSTAT) SL tablet 0 4 mg  0 4 mg Sublingual Q5 Min PRN    nystatin (MYCOSTATIN) cream 1 application  1 application Topical BID    omeprazole (PRILOSEC) suspension 2 mg/mL  20 mg Oral Daily    ondansetron (ZOFRAN) injection 4 mg  4 mg Intravenous Q6H PRN    sodium hypochlorite (DAKIN'S HALF-STRENGTH) 0 25 % topical solution 1 application  1 application Irrigation TID    traMADol (ULTRAM) tablet 25 mg  25 mg Oral Q6H PRN     Prescriptions Prior to Admission   Medication    acetaminophen (TYLENOL) 325 mg tablet    aspirin 81 mg chewable tablet    Bisacodyl (BISAC-EVAC RE)    Calcium Acetate, Phos Binder, (PHOSLYRA) 667 MG/5ML SOLN    carbidopa-levodopa (SINEMET)  mg per tablet    CHELATED MAGNESIUM PO    DOXYCYCLINE MONOHYDRATE PO    furosemide (LASIX) 40 mg tablet    Glucagon HCl, rDNA, (GLUCAGEN HYPOKIT IJ)    Insulin Glargine (BASAGLAR KWIKPEN SC)    ipratropium (ATROVENT HFA) 17 mcg/act inhaler    levothyroxine 75 mcg tablet    Lidocaine-Prilocaine, Bulk, 2 5-2 5 % CREA    magnesium hydroxide (MILK OF MAGNESIA) 400 mg/5 mL oral suspension    Miconazole Nitrate 2 % OINT    nitroglycerin (NITROSTAT) 0 4 mg SL tablet    nystatin (MYCOSTATIN) cream    sodium hypochlorite (HYSEPT) 0 25 %    traMADol (ULTRAM) 50 mg tablet     Allergies   Allergen Reactions    Aminoglycosides     Aspirin     Aztreonam     Bicillin [Penicillin G Benzathine]     Doxycycline     Erythromycin     Erythromycin Base     Indomethacin     Iodinated Diagnostic Agents     Latex     Nevanac [Nepafenac]     Niacin     Other      Sodium, Betadine, Adhesive, Niacin, Indocin, SOUP    Oxytetracycline     Penicillins     Povidone      Other reaction(s): Other (See Comments)  Iodine, Betadine    Prednisone     Sulfa Antibiotics     Tetracyclines & Related     Vancomycin        Vitals: Blood pressure 100/53, pulse 69, temperature 97 5 °F (36 4 °C), temperature source Oral, resp  rate 16, height 5' (1 524 m), weight 65 kg (143 lb 4 8 oz), SpO2 100 %  , room air, Body mass index is 27 99 kg/m²        Intake/Output Summary (Last 24 hours) at 17 1547  Last data filed at 17 1400   Gross per 24 hour   Intake              200 ml   Output                0 ml   Net              200 ml       Physical exam:    General Appearance:   Sleeping, nonresponsive to verbal stimuli, does open her eyes to painful stimuli, does not appear to be in any respiratory distress    Head/eyes:    Normocephalic, without obvious abnormality, atraumatic, large pupils bilateral, PERRL, sclera non-injected, nonicteric    Nose:   Nares normal, mucosa normal, no drainage or sinus tenderness   Mouth:   Moist mucous membranes, no thrush, normal dentition   Neck:   Supple, trachea midline, no adenopathy; JVD not elevated   Lungs:     Decreased aeration bilateral bases secondary to poor effort, no wheezes rales or rhonchi are appreciated    Chest Wall:    No tenderness or deformity    Heart:    Regular rate and rhythm, S1 and S2 normal, no murmur, rub   or gallop   Abdomen:     Soft, non-tender, bowel sounds active all four quadrants,     no masses, no organomegaly   Extremities:   Decreased muscle mass bilateral lower extremities    Skin: Warm, dry, turgor normal, no rashes or lesions   Lymph nodes:   No Cervical or supraclavicular lymphadenopathy   Neurologic:   CNII-XII grossly intact, normal strength, non-focal         Labs:   AB 49/33/62/25/93%  CBC:   Lab Results   Component Value Date    WBC 23 79 (H) 2017    HGB 7 1 (L) 2017    HCT 23 5 (L) 2017    MCV 89 2017     2017    MCH 26 8 2017    MCHC 30 2 (L) 2017    RDW 18 0 (H) 2017    MPV 9 2 2017    NRBC 6 (H) 2017   , CMP:   Lab Results   Component Value Date     (L) 2017    K 4 5 2017    CL 87 (L) 2017    CO2 26 2017    ANIONGAP 14 (H) 2017    BUN 75 (H) 2017    CREATININE 3 57 (H) 2017    GLUCOSE 381 (H) 2017    CALCIUM 10 8 (H) 2017    AST 22 2017    ALT 6 (L) 2017    ALKPHOS 268 (H) 12/08/2017    PROT 7 3 12/08/2017    ALBUMIN 2 5 (L) 12/08/2017    BILITOT 0 40 12/08/2017    EGFR 12 12/08/2017       Imaging and other studies: I have personally reviewed pertinent films in PACS  Chest x-ray, 12/08/2017:  Right perihilar infiltrate/pulmonary edema    Pulmonary function testing:   Not available    Code Status: Level 3 - DNAR and DNI    Cash Urban DO      Portions of the record may have been created with voice recognition software  Occasional wrong word or "sound a like" substitutions may have occurred due to the inherent limitations of voice recognition software  Read the chart carefully and recognize, using context, where substitutions have occurred

## 2017-12-08 NOTE — ED NOTES
Dr Mendez Friendly at bedside      Jenny Jordan, [facility]  12/08/17 7165

## 2017-12-08 NOTE — CONSULTS
Consultation - Nephrology   Sarai De León 68 y o  female MRN: [id number]  Unit/Bed#: [address] [facility] 205-02 Encounter: [id number]    ASSESSMENT and PLAN:  1  ESRD on HD (MWF at [facility]): seen by me on dialysis today  Continue outpatient regimen   2  Access: RUE AVF - clot pulled on HD, blood flow 300ml/min  Slower Qb but functioning  3  Hypertension: High on admission but lower since HD initiation  Metoprolol on hold  4  Anemia:  Continue Venofer 100 mg Monday Wednesday Friday with dialysis sessions  Is on mircera outpatient, last dose 11/29/17 and receives this q2 weeks  Would hold on SAIRA now in light of seizure history  5  Mineral and bone disease:  Continue PhosLo 1 tab t i d  with meals  6  HCAP - on cefepime per primary team  7  Lupus - on plaquenil  8  Diabetes mellitus type 2-continue insulin per primary team  9  Altered mental status - noted at HD today  She is normally able to answer questions  Currently, she is nonverbal  ? If this is due to sepsis vs other etiology  Work up per primary team   10  hyponatremia - correct on dialysis, also needs glycemic control  11  Mild lactic acidosis - AG 14 with lactate 2 5  Monitor this  Other issues: dysphagia, functional quadriplegia, Parkinson's disease-on sinemet, seizure disorder, sacral decubitus ulcer    SUMMARY OF RECOMMENDATIONS:  Plan for HD per outpatient schedule 12/11/17  HISTORY OF PRESENT ILLNESS:  Requesting Physician: Taniya Hyman DO  Reason for Consult: ESRD on HD    Sarai De León is a 68y o  year old female who was admitted to [address] [place] [address] after presenting with altered mental status  A renal consultation is requested today for assistance in the management of ESRD  Sarai De León is a known ESRD patient who undergoes maintenance hemodialysis at [facility] on MWF  History and review of systems is limited as patient is nonverbal and with altered mental status    I had seen this patient in the past and she has been nonverbal in the past as well  She goes systolic is below [facility] for dialysis  Her last dialysis session was cut an hour short due to his significant sacral decubitus pain  She did have a sacral decubitus ulcer that has been present on admission  Per record review, the patient presented with altered mental status brought in by family member  Her blood sugars have also been high dialysis with a blood sugar greater than 400  I have seen and examined the patient on dialysis today at approximately 3:45 p m  Amrita Goldmann Blood flow 300 mL minute, dialysate flow 500 mL/min, UF goal 2 5 L  Blood pressure is stable  The patient is tolerating dialysis but is nonverbal during my exam   Per dialysis nurse, clots pulled during initial cannulation from AV fistula  PAST MEDICAL HISTORY:  Past Medical History:   Diagnosis Date    Anemia     [facility]congest[facility] failure) ([facility] )     Diabetes mellitus ([facility] )     [facility] thrombosis) [facility]     ESRD (end stage renal disease) on dialysis ([facility] )     Hypercoagulable state ([place] [address] )     Factor 5 Leiden    Hyperlipidemia     Hypertension     Hypothyroidism     Lupus     Pacemaker     Parkinson's disease ([place] [address] )     Pneumonia     Psychiatric disorder     UTI (urinary tract infection)     Vascular dialysis catheter in place [facility])        PAST SURGICAL HISTORY:  Past Surgical History:   Procedure Laterality Date    AMPUTATION      CARDIAC PACEMAKER PLACEMENT      CHOLECYSTECTOMY      HYSTERECTOMY      SPINAL FUSION         ALLERGIES:  Allergies   Allergen Reactions    Aminoglycosides     Aspirin     Aztreonam     Bicillin [Penicillin G Benzathine]     Doxycycline     Erythromycin     Erythromycin Base     Indomethacin     Iodinated Diagnostic Agents     Latex     Nevanac [Nepafenac]     Niacin     Other      Sodium, Betadine, Adhesive, Niacin, Indocin, SOUP    Oxytetracycline     Penicillins     Povidone      Other reaction(s):  Other (See Comments)  Iodine, Betadine    Prednisone     Sulfa Antibiotics     Tetracyclines & Related     Vancomycin        SOCIAL HISTORY:  History   Alcohol Use No     History   Drug Use No     History   Smoking Status    Never Smoker   Smokeless Tobacco    Never Used       FAMILY HISTORY:  Family History   Problem Relation Age of Onset    Heart disease Mother     Heart disease Father     Heart disease Brother        MEDICATIONS:    Current Facility-Administered Medications:     aspirin chewable tablet 81 mg, 81 mg, Oral, Daily, Ariela Fly, DO    calcium acetate (PHOSLO) capsule 667 mg, 667 mg, Oral, TID With Meals, Ariela Fly, DO, 667 mg at 12/08/17 1338    carbidopa-levodopa (SINEMET)  mg per tablet 1 tablet, 1 tablet, Oral, TID, Ariela Fly, DO, 1 tablet at 12/08/17 1338    cefepime (MAXIPIME) IVPB (premix) 1,000 mg, 1,000 mg, Intravenous, Q24H, Ariela Fly, DO, Last Rate: 100 mL/hr at 12/08/17 1337, 1,000 mg at 12/08/17 1337    insulin glargine (LANTUS) subcutaneous injection 38 Units, 38 Units, Subcutaneous, HS, Ariela Fly, DO    insulin lispro (HumaLOG) 100 units/mL subcutaneous injection 1-6 Units, 1-6 Units, Subcutaneous, Q6H [address] **AND** Fingerstick Glucose (POCT), , , Q6H, Ariela Fly, DO    iron sucrose (VENOFER) 100 mg in sodium chloride 0 9 % 100 mL IVPB, 100 mg, Intravenous, Once per day on Mon Wed Fri, Maria Del Rosario K Tiarra, DO    levothyroxine tablet 37 5 mcg, 37 5 mcg, Oral, HS, Ariela Fly, DO    magnesium (amino acid chelate) tablet 1 tablet, 1 tablet, Oral, BID, Ariela Fly, DO    nitroglycerin (NITROSTAT) SL tablet 0 4 mg, 0 4 mg, Sublingual, Q5 Min PRN, Ariela Fly, DO    nystatin (MYCOSTATIN) cream 1 application, 1 application, Topical, BID, Ariela Fly, DO, 1 application at 96/28/86 1337    omeprazole (PRILOSEC) suspension 2 mg/mL, 20 mg, Oral, Daily, Ariela Fly, DO    ondansetron (ZOFRAN) injection 4 mg, 4 mg, Intravenous, Q6H PRN, Ariela Mehta, DO    sodium hypochlorite (DAKIN'S HALF-STRENGTH) 0 25 % topical solution 1 application, 1 application, Irrigation, TID, Roya Wagner DO    traMADol (ULTRAM) tablet 25 mg, 25 mg, Oral, Q6H PRN, Roya Wagner DO    REVIEW OF SYSTEMS:  Unable to elicit due to altered mental status/nonverbal     PHYSICAL EXAM:  Current Weight: Weight - Scale: 65 kg (143 lb 4 8 oz)  First Weight: Weight - Scale: 63 5 kg (140 lb)  Vitals:    12/08/17 1430 12/08/17 1500 12/08/17 1506 12/08/17 1530   BP: 118/59 109/59  100/53   Pulse: 98 98  69   Resp:       Temp:   97 5 °F (36 4 °C)    TempSrc:   Oral    SpO2:       Weight:       Height:           Intake/Output Summary (Last 24 hours) at 12/08/17 1605  Last data filed at 12/08/17 1400   Gross per 24 hour   Intake              200 ml   Output                0 ml   Net              200 ml     General: chronically ill appearing  Skin: warm, dry  Eyes: anicteric  ENT: MMM  Neck: supple  Chest: clear b/l but poor inspiratory effort  CVS: +G5C4, +systolic murmur  Abdomen: soft, ND, NT, +peg tube  Extremities: no edema, right hand contracture  Neuro: awake but nonverbal, does not follow commands  Psych: flat affect    Invasive Devices:      Lab Results:     Results from last 7 days  Lab Units 12/08/17  0836   WBC Thousand/uL 23 79*   HEMOGLOBIN g/dL 7 1*   HEMATOCRIT % 23 5*   PLATELETS Thousands/uL 341   SODIUM mmol/L 127*   POTASSIUM mmol/L 4 5   CHLORIDE mmol/L 87*   CO2 mmol/L 26   BUN mg/dL 75*   CREATININE mg/dL 3 57*   CALCIUM mg/dL 10 8*   ALBUMIN g/dL 2 5*   TOTAL PROTEIN g/dL 7 3   BILIRUBIN TOTAL mg/dL 0 40   ALK PHOS U/L 268*   ALT U/L 6*   AST U/L 22   GLUCOSE RANDOM mg/dL 381*     Lab Results   Component Value Date    CALCIUM 10 8 (H) 12/08/2017    PHOS 3 3 11/15/2017

## 2017-12-08 NOTE — RESPIRATORY THERAPY NOTE
RT Protocol Note  Crys Esqueda 68 y o  female MRN: [id number]  Unit/Bed#: [address] [place] [address] 205-02 Encounter: [id number]    Assessment    Principal Problem:    Healthcare-associated pneumonia  Active Problems:    Lupus    Hyperlipidemia    Hypertension    Anemia    Parkinson's disease ([place] [address] )    Pacemaker    Sepsis ([place] [address] )    Type 2 diabetes mellitus with renal manifestations ([place] [address] )    ESRD (end stage renal disease) on dialysis ([place] [address] )    Pressure ulcer of left buttock, unstageable (HCC)    Paroxysmal atrial fibrillation (HCC)    Chronic diastolic congestive heart failure (HCC)    Toxic metabolic encephalopathy    Hyponatremia    Type 2 diabetes mellitus with hyperglycemia, with long-term current use of insulin ([facility])      Home Pulmonary Medications:  atrovent 1p in am    Past Medical History:   Diagnosis Date    Anemia     CHF (congestive heart failure) ([place] [address] )     Diabetes mellitus ([place] [address] )     DVT (deep venous thrombosis) ([place] [address] )     ESRD (end stage renal disease) on dialysis ([place] [address] )     Hypercoagulable state ([place] [address] )     Factor 5 Leiden    Hyperlipidemia     Hypertension     Hypothyroidism     Lupus     Pacemaker     Parkinson's disease ([place] [address] )     Pneumonia     Psychiatric disorder     UTI (urinary tract infection)     Vascular dialysis catheter in place [facility])      Social History     Social History    Marital status: /Civil Union     Spouse name: N/A    Number of children: N/A    Years of education: N/A     Social History Main Topics    Smoking status: Never Smoker    Smokeless tobacco: Never Used    Alcohol use No    Drug use: No    Sexual activity: No     Other Topics Concern    None     Social History Narrative    None       Subjective         Objective    Physical Exam:   Assessment Type: Assess only  General Appearance: Unresponsive  Respiratory Pattern: Normal  Chest Assessment: Chest expansion symmetrical  Bilateral Breath Sounds: Crackles  Cough: None    Vitals:  Blood pressure (!) 185/70, pulse 88, temperature 98 3 °F (36 8 °C), temperature source Oral, resp  rate 20, height 5' (1 524 m), weight 65 kg (143 lb 4 8 oz), SpO2 100 %  Results from last 7 days  Lab Units 12/08/17  0847   HORACIO TEST  Postive Horacio Test       Imaging and other studies: I have personally reviewed pertinent reports              Plan    Respiratory Plan: Mild Distress pathway

## 2017-12-08 NOTE — PHYSICIAN ADVISOR
Current patient class: Inpatient  The patient is currently on Hospital Day: 1      The patient was admitted to the hospital  on 12/8/17 at [phone] for the following diagnosis:  Hyperglycemia [R73 9]  Healthcare-associated pneumonia [J18 9]  DM (diabetes mellitus), secondary uncontrolled ([facility] ) [E13 65]  ESRD (end stage renal disease) on dialysis ([place] [address] ) [N18 6, Z99 2]  Sepsis, due to unspecified organism ([place] [address] ) [A41 9]       There is documentation in the medical record of an expected length of stay of at least 2 midnights  The patient is therefore expected to satisfy the 2 midnight benchmark and given the 2 midnight presumption is appropriate for INPATIENT ADMISSION  Given this expectation of a satisfying stay, CMS instructs us that the patient is most often appropriate for inpatient admission under part A provided medical necessity is documented in the chart  After review of the relevant documentation, labs, vital signs and test results, the patient is appropriate for INPATIENT ADMISSION  Admission to the hospital as an inpatient is a complex decision making process which requires the practitioner to consider the patients presenting complaint, history and physical examination and all relevant testing  With this in mind, in this case, the patient was deemed appropriate for INPATIENT ADMISSION  After review of the documentation and testing available at the time of the admission I concur with this clinical determination of medical necessity  The patient does have an inpatient admission within the previous 30 days  The patient was admitted on 11/15/17 and discharged on 11/22/17 as an inpatient  The patient therefore required readmission review  In this case the patient should be considered a SEPARATE and UNRELATED INPATIENT ADMISSION  The patient had been discharged in stable condition with a completed care plan   There were no unresolved acute medical issues at the time of discharged which would have reasonably been expected to prompt this readmission  The patient's admission from 11/15/17 through 11/22/17 was for AV graft/fistula site bleeding as well as for SIRS criteria and possible sepsis secondary to an infected sacral decubitus ulcer  She required debridement of the sacral decubitus ulcer and completed a course of IV antibiotics during the hospitalization  Her current admission is for acute encephalopathy and for possible healthcare-associated pneumonia  These admissions are considered separate and unrelated  Rationale is as follows: The patient is a 68 yrs   Female who presented to the ED at 12/8/2017  8:08 AM with a chief complaint of Hyperglycemia - Symptomatic (Patient presents via ambulande from Dialysis  Patient's BG was 469 at dialysis this am, BG was 520 in ambulance  Dialysis staff also reports patient is less responsive then normal  )     The patient is a 68year-old female who was admitted on 12/8/17 for acute encephalopathy and SIRS criteria/possible sepsis secondary to possible healthcare-associated pneumonia  The patient was found to have a lactic acidosis and a significant leukocytosis  She is being treated with IV antibiotics      The patients vitals on arrival were ED Triage Vitals   Temperature Pulse Respirations Blood Pressure SpO2   12/08/17 1039 12/08/17 0811 12/08/17 0811 12/08/17 0811 12/08/17 0900   98 3 °F (36 8 °C) 67 (!) 29 (!) 194/79 100 %      Temp Source Heart Rate Source Patient Position - Orthostatic VS BP Location FiO2 (%)   12/08/17 1039 12/08/17 0811 12/08/17 0811 12/08/17 0811 --   Oral Monitor Lying Left arm       Pain Score       12/08/17 0811       No Pain           Past Medical History:   Diagnosis Date    Anemia     [facility]     [facility]     D[facility]deep venous thrombosis) ([facility]     ESRD (end stage renal disease) on dialysis ([facility] )     Hypercoagulable state ([facility] )     Factor 5 Leiden    Hyperlipidemia     Hypertension     Hypothyroidism     Lupus     Pacemaker     [facility]'s [facility] )     Pneumonia     Psychiatric disorder     UTI (urinary tract infection)     Vascular dialysis [facility])      Past Surgical History:   Procedure Laterality Date    AMPUTATION      CARDIAC PACEMAKER PLACEMENT      CHOLECYSTECTOMY      HYSTERECTOMY      SPINAL FUSION             Consults have been placed to:   IP CONSULT TO INFECTIOUS DISEASES  IP CONSULT TO PULMONOLOGY  IP CONSULT TO CASE MANAGEMENT  IP CONSULT TO NEPHROLOGY  IP CONSULT TO ACUTE CARE SURGERY    Vitals:    12/08/17 1530 12/08/17 1600 12/08/17 1630 12/08/17 1700   BP: 100/53 (!) 100/49 103/53 (!) 109/49   Pulse: 69 72 92 97   Resp:       Temp:       TempSrc:       SpO2:       Weight:       Height:           Most recent labs:    Recent Labs      12/08/17   0836   WBC  23 79*   HGB  7 1*   HCT  23 5*   PLT  341   K  4 5   NA  127*   CALCIUM  10 8*   BUN  75*   CREATININE  3 57*   AST  22   ALT  6*   ALKPHOS  268*   BILITOT  0 40       Scheduled Meds:  aspirin 81 mg Oral Daily   calcium acetate 667 mg Oral TID With Meals   carbidopa-levodopa 1 tablet Oral TID   cefepime 1,000 mg Intravenous Q24H   insulin glargine 38 Units Subcutaneous HS   insulin lispro 1-6 Units Subcutaneous Q6H [address]   iron sucrose 100 mg Intravenous Once per day on Mon Wed Fri   levothyroxine 37 5 mcg Oral HS   magnesium (amino acid chelate) 1 tablet Oral BID   nystatin 1 application Topical BID   omeprazole (PRILOSEC) suspension 2 mg/mL 20 mg Oral Daily   sodium hypochlorite 1 application Irrigation TID     Continuous Infusions:   PRN Meds: nitroglycerin    ondansetron    traMADol    Surgical procedures (if appropriate):

## 2017-12-08 NOTE — ED PROVIDER NOTES
History  Chief Complaint   Patient presents with    Hyperglycemia - Symptomatic     Patient presents via ambulande from Dialysis  Patient's BG was 469 at dialysis this am, BG was 520 in ambulance  Dialysis staff also reports patient is less responsive then normal       Pt sent from [facility]; less responsive than usual  BS very high; did not get insulin today; Hx of parkinsonism, DM, lupus, pacer, large decubitus ulcer        Altered Mental Status   Presenting symptoms: lethargy and partial responsiveness    Severity:  Severe  Most recent episode: Today  Episode history:  Multiple  Timing:  Constant  Progression:  Worsening  Chronicity:  Chronic  Context: dementia and nursing home resident        Prior to Admission Medications   Prescriptions Last Dose Informant Patient Reported? Taking?    Bisacodyl (BISAC-EVAC RE) 2017 at Unknown time  Yes Yes   Sig: Insert 10 mg into the rectum as needed     CHELATED MAGNESIUM PO   Yes Yes   Si mg by Per PEG Tube route 2 (two) times a day   Calcium Acetate, Phos Binder, (PHOSLYRA) 667 MG/5ML SOLN   Yes Yes   Si mg by Per PEG Tube route 3 (three) times a day   DOXYCYCLINE MONOHYDRATE PO   Yes Yes   Si mg by Per PEG Tube route 2 (two) times a day   Glucagon HCl, rDNA, (GLUCAGEN HYPOKIT IJ) 2017 at Unknown time  Yes Yes   Sig: Inject as directed   Insulin Glargine (BASAGLAR KWIKPEN SC)   Yes Yes   Sig: Inject 38 Units under the skin daily at bedtime   Lidocaine-Prilocaine, Bulk, 2 5-2 5 % CREA 2017 at Unknown time  Yes Yes   Sig: Apply to Right upper extremities toothpaste thickness - do not rub in- cover with telfa and wrap with eileen one hour prior to dialysis on dialysis days     Miconazole Nitrate 2 % OINT   Yes Yes   Sig: Apply 1 application topically as needed Groin area   Omeprazole 20 MG TBEC 2017 at Unknown time  Yes Yes   Sig: Take 20 mg by mouth daily   Specialty Vitamins Products (MAGNESIUM, AMINO ACID CHELATE,) 133 MG tablet 2017 at Unknown time  Yes Yes   Sig: Take 1 tablet by mouth 2 (two) times a day   acetaminophen (TYLENOL) 325 mg tablet 2017 at Unknown time  No Yes   Si tablets by Per G Tube route every 4 (four) hours as needed for mild pain   aspirin 81 mg chewable tablet   Yes Yes   Si mg by Per PEG Tube route daily   carbidopa-levodopa (SINEMET)  mg per tablet 2017 at Unknown time  Yes Yes   Sig: Take 1 tablet by mouth 3 (three) times a day   furosemide (LASIX) 40 mg tablet   Yes Yes   Si mg by Per G Tube route daily   gabapentin (NEURONTIN) 300 mg capsule 2017 at Unknown time  Yes Yes   Sig: Take 100 mg by mouth daily at bedtime   hydroxychloroquine (PLAQUENIL) 200 mg tablet 2017 at Unknown time  Yes Yes   Sig: Take 200 mg by mouth daily at bedtime   insulin aspart (NovoLOG) 100 units/mL injection 2017 at Unknown time  Yes Yes   Sig: Inject 6 Units under the skin 3 (three) times a day before meals   insulin detemir (LEVEMIR) 100 units/mL subcutaneous injection 2017 at Unknown time  Yes Yes   Sig: Inject 14 Units under the skin daily at bedtime     ipratropium (ATROVENT HFA) 17 mcg/act inhaler 2017 at Unknown time  Yes Yes   Sig: Inhale 1 puff every morning   lacosamide (VIMPAT) 10 mg/mL   No No   Sig: 10 mL by Per PEG Tube route 2 (two) times a day for 10 days   levothyroxine 75 mcg tablet   Yes Yes   Si 5 mcg by Per G Tube route daily in the early morning     loperamide (LOPERAMIDE A-D) 2 MG tablet 2017 at Unknown time  Yes Yes   Sig: Take 2 mg by mouth Before Dialysis (TTS)     magnesium hydroxide (MILK OF MAGNESIA) 400 mg/5 mL oral suspension 2017 at Unknown time  Yes Yes   Sig: Take by mouth daily as needed for constipation   nitroglycerin (NITROSTAT) 0 4 mg SL tablet 2017 at Unknown time  Yes Yes   Sig: Place 0 4 mg under the tongue every 5 (five) minutes as needed for chest pain   nystatin (MYCOSTATIN) cream 2017 at Unknown time No Yes   Sig: Apply 2 g topically 2 (two) times a day   Patient taking differently: Apply 1 application topically as needed     sodium hypochlorite (HYSEPT) 0 25 %   Yes Yes   Sig: Irrigate with 1 application as directed 3 (three) times a day as needed Sacral wound   traMADol (ULTRAM) 50 mg tablet 2017 at Unknown time  Yes Yes   Si mg by Per G Tube route every 6 (six) hours as needed for moderate pain        Facility-Administered Medications: None       Past Medical History:   Diagnosis Date    Anemia     CHF (congestive heart failure) ([place] [address] )     Diabetes mellitus ([place] [address] )     DVT (deep venous thrombosis) ([place] [address] )     ESRD (end stage renal disease) on dialysis ([place] [address] )     Hypercoagulable state ([place] [address] )     Factor 5 Leiden    Hyperlipidemia     Hypertension     Hypothyroidism     Lupus     Pacemaker     Parkinson's disease ([place] [address] )     Pneumonia     Psychiatric disorder     UTI (urinary tract infection)     Vascular dialysis catheter in place [facility])        Past Surgical History:   Procedure Laterality Date    AMPUTATION      CARDIAC PACEMAKER PLACEMENT      CHOLECYSTECTOMY      HYSTERECTOMY      SPINAL FUSION         Family History   Problem Relation Age of Onset    Heart disease Mother     Heart disease Father     Heart disease Brother      I have reviewed and agree with the history as documented      Social History   Substance Use Topics    Smoking status: Never Smoker    Smokeless tobacco: Never Used    Alcohol use No        Review of Systems   Unable to perform ROS: Mental status change       Physical Exam  ED Triage Vitals   Temperature Pulse Respirations Blood Pressure SpO2   17 1039 17 0811 17 0811 17 0811 17 0900   98 3 °F (36 8 °C) 67 (!) 29 (!) 194/79 100 %      Temp Source Heart Rate Source Patient Position - Orthostatic VS BP Location FiO2 (%)   17 1039 17 0811 17 0811 17 0811 --   Oral Monitor Lying Left arm       Pain Score 12/08/17 0811       No Pain           Orthostatic Vital Signs  Vitals:    12/08/17 0811 12/08/17 0900 12/08/17 1039   BP: (!) 194/79 (!) 183/70 (!) 185/70   Pulse: 67 88 88   Patient Position - Orthostatic VS: Lying  Lying       Physical Exam   Constitutional: She appears well-developed  HENT:   Nose: Nose normal    Eyes: Pupils are equal, round, and reactive to light  Neck: Normal range of motion  Cardiovascular: Normal heart sounds  An irregular rhythm present  Pulmonary/Chest: Effort normal and breath sounds normal    Abdominal: Soft  Musculoskeletal:   Contractures of all limbs noted   Neurological:   unable to assess   Skin: Skin is warm and dry  Psychiatric:   Unable to assess   Nursing note and vitals reviewed        ED Medications  Medications   aspirin (ECOTRIN) EC tablet 325 mg (not administered)   traMADol (ULTRAM) tablet 50 mg (not administered)   nitroglycerin (NITROSTAT) SL tablet 0 4 mg (not administered)   ipratropium (ATROVENT HFA) inhaler 1 puff (not administered)   gabapentin (NEURONTIN) capsule 100 mg (not administered)   lacosamide (VIMPAT) 10 mg/mL oral solution 100 mg (not administered)   insulin lispro (HumaLOG) 100 units/mL subcutaneous injection 8 Units (not administered)   insulin glargine (LANTUS) subcutaneous injection 38 Units (not administered)   loperamide (IMODIUM) capsule 2 mg (not administered)   hydroxychloroquine (PLAQUENIL) tablet 200 mg (not administered)   carbidopa-levodopa (SINEMET)  mg per tablet 1 tablet (not administered)   sodium hypochlorite (DAKIN'S HALF-STRENGTH) 0 25 % topical solution 1 application (not administered)   metoprolol tartrate (LOPRESSOR) tablet 25 mg (not administered)   nystatin (MYCOSTATIN) cream 1 application (not administered)   calcium acetate (PHOSLO) capsule 667 mg (not administered)   magnesium (amino acid chelate) tablet 1 tablet (not administered)   levothyroxine tablet 75 mcg (not administered)   pantoprazole (PROTONIX) EC tablet 20 mg (not administered)   ondansetron (ZOFRAN) injection 4 mg (not administered)   cefepime (MAXIPIME) IVPB (premix) 1,000 mg (not administered)   sodium chloride 0 9 % bolus 1,000 mL (1,000 mL Intravenous New Bag 12/8/17 0842)   insulin regular (HumuLIN R,NovoLIN R) injection 6 Units (6 Units Intravenous Given 12/8/17 0838)       Diagnostic Studies  Results Reviewed     Procedure Component Value Units Date/Time    Fingerstick Glucose (POCT) [[id number]]  (Abnormal) Collected:  12/08/17 0938    Lab Status:  Final result Updated:  12/08/17 0939     POC Glucose 338 (H) mg/dl     CBC and differential [[id number]]  (Abnormal) Collected:  12/08/17 0836    Lab Status:  Final result Specimen:  Blood from Arm, Left Updated:  12/08/17 0916     WBC 23 79 (H) Thousand/uL      RBC 2 65 (L) Million/uL      Hemoglobin 7 1 (L) g/dL      Hematocrit 23 5 (L) %      MCV 89 fL      MCH 26 8 pg      MCHC 30 2 (L) g/dL      RDW 18 0 (H) %      MPV 9 2 fL      Platelets 199 Thousands/uL     Narrative: This is an appended report  These results have been appended to a previously verified report  Lactic acid, plasma [[id number]]  (Abnormal) Collected:  12/08/17 0836    Lab Status:  Final result Specimen:  Blood Updated:  12/08/17 0914     LACTIC ACID 2 8 (HH) mmol/L     Narrative:         Result may be elevated if tourniquet was used during collection      Comprehensive metabolic panel [[id number]]  (Abnormal) Collected:  12/08/17 0836    Lab Status:  Final result Specimen:  Blood from Arm, Left Updated:  12/08/17 0913     Sodium 127 (L) mmol/L      Potassium 4 5 mmol/L      Chloride 87 (L) mmol/L      CO2 26 mmol/L      Anion Gap 14 (H) mmol/L      BUN 75 (H) mg/dL      Creatinine 3 57 (H) mg/dL      Glucose 381 (H) mg/dL      Calcium 10 8 (H) mg/dL      AST 22 U/L      ALT 6 (L) U/L      Alkaline Phosphatase 268 (H) U/L      Total Protein 7 3 g/dL      Albumin 2 5 (L) g/dL      Total Bilirubin 0 40 mg/dL      eGFR 12 ml/min/1 73sq m     Narrative:         National Kidney Disease Education Program recommendations are as follows:  GFR calculation is accurate only with a steady state creatinine  Chronic Kidney disease less than 60 ml/min/1 73 sq  meters  Kidney failure less than 15 ml/min/1 73 sq  meters  POCT Blood Gas (G3+) [[id number]]  (Abnormal) Collected:  12/08/17 0847    Lab Status:  Final result Updated:  12/08/17 0850     pH, Art i-STAT 7 492 (H)     pCO2, Art i-STAT 33 7 (L) mm HG      pO2, ART i-STAT 62 0 (L) mm HG      BE, i-STAT 3 mmol/L      HCO3, Art i-STAT 25 8 mmol/L      CO2, i-STAT 27 mmol/L      O2 Sat, i-STAT 93 (L) %      POC FIO2 21 L      Specimen Type ARTERIAL     SITE Right Radial     HORACIO TEST Saralee Ripper Test    Blood culture #1 [[id number]] Collected:  12/08/17 0836    Lab Status: In process Specimen:  Blood from Hand, Left Updated:  12/08/17 0845    Blood culture #2 [[id number]] Collected:  12/08/17 0836    Lab Status: In process Specimen:  Blood from Arm, Left Updated:  12/08/17 0844    Fingerstick Glucose (POCT) [[id number]]  (Abnormal) Collected:  12/08/17 0835    Lab Status:  Final result Updated:  12/08/17 0839     POC Glucose 371 (H) mg/dl                  XR chest 1 view portable   Final Result by Barbara Arce MD (12/08 1354)      Mild ill-defined left perihilar infiltrate which might represent asymmetric edema or pneumonia           Workstation performed: [id number]                    Procedures  ECG 12 Lead Documentation  Date/Time: 12/8/2017 8:39 AM  Performed by: Josue Byrd  Authorized by: Josue Byrd     ECG reviewed by me, the ED Provider: yes    Patient location:  ED  Previous ECG:     Comparison to cardiac monitor: No    Interpretation:     Interpretation: abnormal    Rate:     ECG rate:  64    ECG rate assessment: normal    Rhythm:     Rhythm: atrial fibrillation and paced    Pacing:     Capture:  Intermittent    Type of pacing:  Ventricular  QRS:     QRS axis: Normal    QRS intervals:  Normal  Conduction:     Conduction: abnormal      Abnormal conduction: complete RBBB, LAFB and bifascicular block    ST segments:     ST segments:  Normal  T waves:     T waves: normal             Phone Contacts  ED Phone Contact    ED Course  ED Course                                MDM  Number of Diagnoses or Management Options  DM (diabetes mellitus), secondary uncontrolled ([facility] ): established and worsening  ESRD (end stage renal disease) on dialysis [facility]): established and worsening  Sepsis, due to unspecified organism [facility]): established and worsening     Amount and/or Complexity of Data Reviewed  Clinical lab tests: ordered and reviewed  Tests in the radiology section of CPT®: reviewed and ordered  Decide to obtain previous medical records or to obtain history from someone other than the patient: yes    Patient Progress  Patient progress: stable    CritCare Time    Disposition  Final diagnoses:   Sepsis, due to unspecified organism ([place] [address] )   DM (diabetes mellitus), secondary uncontrolled ([place] [address] )   ESRD (end stage renal disease) on dialysis [facility])     Time reflects when diagnosis was documented in both MDM as applicable and the Disposition within this note     Time User Action Codes Description Comment    12/8/2017  9:19 AM Dominik JONES Add [A41 9] Sepsis, due to unspecified organism ([facility] )     12/8/2017  9:20 AM Dominik JONES Add [E13 65] DM (diabetes mellitus), secondary uncontrolled ([facility] )     12/8/2017  9:20 AM Ray Peri Add [N18 6,  Z99 2] ESRD (end stage renal disease) on dialysis ([facility]  )     12/8/2017 10:15 AM Bandar Mehta Modify [A41 9] Sepsis, due to unspecified organism ([facility] )     12/8/2017 10:15 AM Baron Renetta Mehta Add [J18 9] Healthcare-associated pneumonia     12/8/2017 10:15 AM Bandar Mehta Modify [A41 9] Sepsis, due to unspecified organism ([facility] )     12/8/2017 10:15 AM Hemal MCDANIELS Modify [J18 9] Healthcare-associated pneumonia     12/8/2017 10:15 AM Bandar Mehta Modify [A41 9] Sepsis, due to unspecified organism ([facility] )     12/8/2017 10:16 AM FlyMitra Modify [A41 9] Sepsis, due to unspecified organism ([address] )     12/8/2017 10:17 AM Mitra Mehta Modify [A41 9] Sepsis, due to unspecified organism ([address] )     12/8/2017 10:20 AM FlyMitra Modify [A41 9] Sepsis, due to unspecified organism ([address] )     12/8/2017 10:20 AM FlyMitra Modify [A41 9] Sepsis, due to unspecified organism ([address] )     12/8/2017 10:20 AM Mitra Mehta Add [L89 154] Decubitus ulcer of sacral region, stage 4 ([address] )     12/8/2017 10:20 AM Mitra Mehta Modify [A41 9] Sepsis, due to unspecified organism ([facility] )     12/8/2017 10:20 AM Mitra Mehta Modify [L89 154] Decubitus ulcer of sacral region, stage 4 ([address] )     12/8/2017 10:20 AM Mitra Mehta Modify [A41 9] Sepsis, due to unspecified organism ([facility] )     12/8/2017 10:20 AM Mitra Mehta Modify [A41 9] Sepsis, due to unspecified organism [facility])       ED Disposition     ED Disposition Condition Comment    Admit  Case was discussed withDr Tonya Sotelo   and the patient's admission status was agreed to be Admission Status: inpatient status to the service of Dr Tonya Sotelo           Follow-up Information    None       Current Discharge Medication List      CONTINUE these medications which have NOT CHANGED    Details   acetaminophen (TYLENOL) 325 mg tablet 2 tablets by Per G Tube route every 4 (four) hours as needed for mild pain  Qty: 30 tablet, Refills: 0      aspirin 81 mg chewable tablet 81 mg by Per PEG Tube route daily      Bisacodyl (BISAC-EVAC RE) Insert 10 mg into the rectum as needed        Calcium Acetate, Phos Binder, (PHOSLYRA) 667 MG/5ML SOLN 5 mg by Per PEG Tube route 3 (three) times a day      carbidopa-levodopa (SINEMET)  mg per tablet Take 1 tablet by mouth 3 (three) times a day      CHELATED MAGNESIUM PO 27 mg by Per PEG Tube route 2 (two) times a day      DOXYCYCLINE MONOHYDRATE  mg by Per PEG Tube route 2 (two) times a day      furosemide (LASIX) 40 mg tablet 40 mg by Per G Tube route daily      gabapentin (NEURONTIN) 300 mg capsule Take 100 mg by mouth daily at bedtime      Glucagon HCl, rDNA, (GLUCAGEN HYPOKIT IJ) Inject as directed      hydroxychloroquine (PLAQUENIL) 200 mg tablet Take 200 mg by mouth daily at bedtime      insulin aspart (NovoLOG) 100 units/mL injection Inject 6 Units under the skin 3 (three) times a day before meals      insulin detemir (LEVEMIR) 100 units/mL subcutaneous injection Inject 14 Units under the skin daily at bedtime        Insulin Glargine (BASAGLAR KWIKPEN SC) Inject 38 Units under the skin daily at bedtime      ipratropium (ATROVENT HFA) 17 mcg/act inhaler Inhale 1 puff every morning      levothyroxine 75 mcg tablet 37 5 mcg by Per G Tube route daily in the early morning        Lidocaine-Prilocaine, Bulk, 2 5-2 5 % CREA Apply to Right upper extremities toothpaste thickness - do not rub in- cover with telfa and wrap with eileen one hour prior to dialysis on dialysis days        loperamide (LOPERAMIDE A-D) 2 MG tablet Take 2 mg by mouth Before Dialysis (TTS)        magnesium hydroxide (MILK OF MAGNESIA) 400 mg/5 mL oral suspension Take by mouth daily as needed for constipation      Miconazole Nitrate 2 % OINT Apply 1 application topically as needed Groin area      nitroglycerin (NITROSTAT) 0 4 mg SL tablet Place 0 4 mg under the tongue every 5 (five) minutes as needed for chest pain      nystatin (MYCOSTATIN) cream Apply 2 g topically 2 (two) times a day  Qty: 30 g, Refills: 0      Omeprazole 20 MG TBEC Take 20 mg by mouth daily      sodium hypochlorite (HYSEPT) 0 25 % Irrigate with 1 application as directed 3 (three) times a day as needed Sacral wound      Specialty Vitamins Products (MAGNESIUM, AMINO ACID CHELATE,) 133 MG tablet Take 1 tablet by mouth 2 (two) times a day      traMADol (ULTRAM) 50 mg tablet 50 mg by Per G Tube route every 6 (six) hours as needed for moderate pain        lacosamide (VIMPAT) 10 mg/mL 10 mL by Per PEG Tube route 2 (two) times a day for 10 days  Qty: 200 mL, Refills: 0         STOP taking these medications       aspirin (ECOTRIN LOW STRENGTH) 81 mg EC tablet Comments:   Reason for Stopping:         calcium acetate (PHOSLO) 667 mg capsule Comments:   Reason for Stopping:         insulin glargine (LANTUS) 100 units/mL subcutaneous injection Comments:   Reason for Stopping:         metoprolol tartrate (LOPRESSOR) 25 mg tablet Comments:   Reason for Stopping:             No discharge procedures on file      ED Provider  Electronically Signed by           Evette Astudillo MD  12/08/17 3933

## 2017-12-09 ENCOUNTER — APPOINTMENT (INPATIENT)
Dept: RADIOLOGY | Facility: HOSPITAL | Age: 77
DRG: 871 | End: 2017-12-09
Payer: MEDICARE

## 2017-12-09 LAB
ABO GROUP BLD BPU: NORMAL
ABO GROUP BLD BPU: NORMAL
ALBUMIN SERPL BCP-MCNC: 2.2 G/DL (ref 3.5–5)
ALP SERPL-CCNC: 202 U/L (ref 46–116)
ALT SERPL W P-5'-P-CCNC: 7 U/L (ref 12–78)
ANION GAP SERPL CALCULATED.3IONS-SCNC: 12 MMOL/L (ref 4–13)
AST SERPL W P-5'-P-CCNC: 12 U/L (ref 5–45)
BILIRUB SERPL-MCNC: 0.3 MG/DL (ref 0.2–1)
BPU ID: NORMAL
BPU ID: NORMAL
BUN SERPL-MCNC: 44 MG/DL (ref 5–25)
CALCIUM SERPL-MCNC: 9.7 MG/DL (ref 8.3–10.1)
CHLORIDE SERPL-SCNC: 95 MMOL/L (ref 100–108)
CO2 SERPL-SCNC: 28 MMOL/L (ref 21–32)
CREAT SERPL-MCNC: 2.53 MG/DL (ref 0.6–1.3)
CROSSMATCH: NORMAL
CROSSMATCH: NORMAL
ERYTHROCYTE [DISTWIDTH] IN BLOOD BY AUTOMATED COUNT: 17.7 % (ref 11.6–15.1)
GFR SERPL CREATININE-BSD FRML MDRD: 18 ML/MIN/1.73SQ M
GLUCOSE SERPL-MCNC: 222 MG/DL (ref 65–140)
GLUCOSE SERPL-MCNC: 230 MG/DL (ref 65–140)
GLUCOSE SERPL-MCNC: 254 MG/DL (ref 65–140)
GLUCOSE SERPL-MCNC: 267 MG/DL (ref 65–140)
GLUCOSE SERPL-MCNC: 315 MG/DL (ref 65–140)
GLUCOSE SERPL-MCNC: 337 MG/DL (ref 65–140)
GLUCOSE SERPL-MCNC: 350 MG/DL (ref 65–140)
GLUCOSE SERPL-MCNC: 410 MG/DL (ref 65–140)
HCT VFR BLD AUTO: 20.3 % (ref 34.8–46.1)
HGB BLD-MCNC: 6 G/DL (ref 11.5–15.4)
HGB BLD-MCNC: 9.1 G/DL (ref 11.5–15.4)
LACTATE SERPL-SCNC: 3.4 MMOL/L (ref 0.5–2)
MCH RBC QN AUTO: 26.9 PG (ref 26.8–34.3)
MCHC RBC AUTO-ENTMCNC: 29.6 G/DL (ref 31.4–37.4)
MCV RBC AUTO: 91 FL (ref 82–98)
PLATELET # BLD AUTO: 261 THOUSANDS/UL (ref 149–390)
PMV BLD AUTO: 9.5 FL (ref 8.9–12.7)
POTASSIUM SERPL-SCNC: 3.1 MMOL/L (ref 3.5–5.3)
PROT SERPL-MCNC: 6.1 G/DL (ref 6.4–8.2)
RBC # BLD AUTO: 2.23 MILLION/UL (ref 3.81–5.12)
SODIUM SERPL-SCNC: 135 MMOL/L (ref 136–145)
UNIT DISPENSE STATUS: NORMAL
UNIT DISPENSE STATUS: NORMAL
UNIT PRODUCT CODE: NORMAL
UNIT PRODUCT CODE: NORMAL
UNIT RH: NORMAL
UNIT RH: NORMAL
WBC # BLD AUTO: 15.09 THOUSAND/UL (ref 4.31–10.16)

## 2017-12-09 PROCEDURE — 85027 COMPLETE CBC AUTOMATED: CPT | Performed by: INTERNAL MEDICINE

## 2017-12-09 PROCEDURE — 85018 HEMOGLOBIN: CPT | Performed by: INTERNAL MEDICINE

## 2017-12-09 PROCEDURE — 86902 BLOOD TYPE ANTIGEN DONOR EA: CPT

## 2017-12-09 PROCEDURE — 82948 REAGENT STRIP/BLOOD GLUCOSE: CPT

## 2017-12-09 PROCEDURE — P9016 RBC LEUKOCYTES REDUCED: HCPCS

## 2017-12-09 PROCEDURE — 30233N1 TRANSFUSION OF NONAUTOLOGOUS RED BLOOD CELLS INTO PERIPHERAL VEIN, PERCUTANEOUS APPROACH: ICD-10-PCS | Performed by: HOSPITALIST

## 2017-12-09 PROCEDURE — 80053 COMPREHEN METABOLIC PANEL: CPT | Performed by: INTERNAL MEDICINE

## 2017-12-09 PROCEDURE — 94760 N-INVAS EAR/PLS OXIMETRY 1: CPT

## 2017-12-09 PROCEDURE — 71010 HB CHEST X-RAY 1 VIEW FRONTAL (PORTABLE): CPT

## 2017-12-09 PROCEDURE — 83605 ASSAY OF LACTIC ACID: CPT | Performed by: INTERNAL MEDICINE

## 2017-12-09 RX ORDER — INSULIN GLARGINE 100 [IU]/ML
16 INJECTION, SOLUTION SUBCUTANEOUS EVERY 12 HOURS SCHEDULED
Status: DISCONTINUED | OUTPATIENT
Start: 2017-12-09 | End: 2017-12-10

## 2017-12-09 RX ORDER — TRAMADOL HYDROCHLORIDE 50 MG/1
50 TABLET ORAL EVERY 6 HOURS PRN
Status: DISCONTINUED | OUTPATIENT
Start: 2017-12-09 | End: 2017-12-12 | Stop reason: HOSPADM

## 2017-12-09 RX ORDER — POTASSIUM CHLORIDE 14.9 MG/ML
20 INJECTION INTRAVENOUS ONCE
Status: COMPLETED | OUTPATIENT
Start: 2017-12-09 | End: 2017-12-09

## 2017-12-09 RX ORDER — POTASSIUM CHLORIDE 20 MEQ/1
20 TABLET, EXTENDED RELEASE ORAL
Status: COMPLETED | OUTPATIENT
Start: 2017-12-09 | End: 2017-12-11

## 2017-12-09 RX ADMIN — INSULIN LISPRO 2 UNITS: 100 INJECTION, SOLUTION INTRAVENOUS; SUBCUTANEOUS at 21:18

## 2017-12-09 RX ADMIN — POTASSIUM CHLORIDE 20 MEQ: 200 INJECTION, SOLUTION INTRAVENOUS at 16:13

## 2017-12-09 RX ADMIN — POTASSIUM CHLORIDE 20 MEQ: 1500 TABLET, EXTENDED RELEASE ORAL at 09:17

## 2017-12-09 RX ADMIN — INSULIN LISPRO 6 UNITS: 100 INJECTION, SOLUTION INTRAVENOUS; SUBCUTANEOUS at 05:58

## 2017-12-09 RX ADMIN — CARBIDOPA AND LEVODOPA 1 TABLET: 25; 100 TABLET ORAL at 21:18

## 2017-12-09 RX ADMIN — ASPIRIN 81 MG 81 MG: 81 TABLET ORAL at 09:17

## 2017-12-09 RX ADMIN — NYSTATIN 1 APPLICATION: 100000 CREAM TOPICAL at 17:55

## 2017-12-09 RX ADMIN — CARBIDOPA AND LEVODOPA 1 TABLET: 25; 100 TABLET ORAL at 09:17

## 2017-12-09 RX ADMIN — Medication 20 MG: at 09:28

## 2017-12-09 RX ADMIN — CALCIUM ACETATE 667 MG: 667 CAPSULE ORAL at 09:17

## 2017-12-09 RX ADMIN — HYOSCYAMINE SULFATE 1 APPLICATION: 16 SOLUTION at 09:23

## 2017-12-09 RX ADMIN — INSULIN GLARGINE 16 UNITS: 100 INJECTION, SOLUTION SUBCUTANEOUS at 21:18

## 2017-12-09 RX ADMIN — CALCIUM ACETATE 667 MG: 667 CAPSULE ORAL at 11:50

## 2017-12-09 RX ADMIN — HYOSCYAMINE SULFATE 1 APPLICATION: 16 SOLUTION at 16:13

## 2017-12-09 RX ADMIN — NYSTATIN 1 APPLICATION: 100000 CREAM TOPICAL at 09:19

## 2017-12-09 RX ADMIN — INSULIN LISPRO 1 UNITS: 100 INJECTION, SOLUTION INTRAVENOUS; SUBCUTANEOUS at 16:12

## 2017-12-09 RX ADMIN — CALCIUM ACETATE 667 MG: 667 CAPSULE ORAL at 16:12

## 2017-12-09 RX ADMIN — HYOSCYAMINE SULFATE 1 APPLICATION: 16 SOLUTION at 21:20

## 2017-12-09 RX ADMIN — LEVOTHYROXINE SODIUM 37.5 MCG: 75 TABLET ORAL at 21:17

## 2017-12-09 RX ADMIN — TRAMADOL HYDROCHLORIDE 50 MG: 50 TABLET, FILM COATED ORAL at 11:50

## 2017-12-09 RX ADMIN — INSULIN LISPRO 2 UNITS: 100 INJECTION, SOLUTION INTRAVENOUS; SUBCUTANEOUS at 11:51

## 2017-12-09 RX ADMIN — CARBIDOPA AND LEVODOPA 1 TABLET: 25; 100 TABLET ORAL at 16:12

## 2017-12-09 RX ADMIN — INSULIN LISPRO 5 UNITS: 100 INJECTION, SOLUTION INTRAVENOUS; SUBCUTANEOUS at 01:36

## 2017-12-09 NOTE — CONSULTS
Consultation - Infectious Disease   George Schlatter 68 y o  female MRN: [id number]  Unit/Bed#: [address] [place] [address] [id number] Encounter: [id number]      Assessment/Plan    77F h/o Lupus on plaquenil c/b ESRD on Hd, chronic sacral wound, IDDM, Aifb, CHF s/p PPM, HTN, PAD, h/o spinal fusion  She presents with AMS and hyperglycemia  Initial labs with lactic acidosis and leukocytosis, and CXR concerning for L sided infiltrate in setting of partial and missed dialysis  Otherwise afebrile  No overt signs of infection  She is certainly high risk and would be most concerned for CLABSI from AV fistula vs acute on chronic osteomyelitis from sacral wound  However on exam wound looks good  No clinical evidence of PNA (lungs clear, no hypoxia/hypercarbia, CXR explained by edema)  Suspect AMS driven by hyperglycemia and metabolic derangement from missed dialysis this week  Hyperglycemia itself can cause leukemoid reaction leading to leukocytosis  1 Leukocytosis - Resolving  - would f/u initial blood cultures sent 12/8  - can STOP cefepime  - recommend correcting underlying hyperglycemia and metabolic abnormalities with insulin and dialysis    2  Sacral wound - appears intact  - recommend wound care while hospitalized    3  Drug allergies - Numerous allergies listed in chart with unknown reactions  Currently tolerating cefepime  If clinical deterioration would consider empiric Daptomycin given h/o VRE and Carbepenem such as meropenem given ESBL/MDR-PSA  Will follow  History of Present Illness   Physician Requesting Consult: Loc Bonner DO  Reason for Consult / Principal Problem: Healthcare-Associated Pneumonia    HPI: George Schlatter is a 68y o  year old female who resides in nursing facility with h/o Lupus on plaquenil c/b ESRD on HD, bed bound c/b sacral wound, IDDM, Afib, CHF s/p PPM placement, HTN, PAD, h/o spinal fusion, and ?seizure vs parkinsons (baseline limited verbal per report)    She presents from nursing facility with altered mental status and hyperglycemia  She has multiple recent hospitalizations  Ad[facility] to [facility] 11/14 with bleeding from AV fistula with discontinuation of coumadin  During hospitalization noted to have leukocytosis to 23, infectious work-up performed including blood cultures, urine cultures, C  Diff which were negative  Source was felt to be sacral ulcer for which she was treated with 7 days empiric cefepime per ID and general surgery performed beside I&D  Notably wound cx from 11/14 grew Proteus mirabilis (I-cipro, R-tetracycline), ESBL E  Coli (S-erta, gent, imi, nick, pip-tazo, R-cefepime), VRE E  Faecalis (but S-amp), and MDR PsA (S-Cefepime, ceftaz, imi, pip-tazo, I-nick) She was discharge back to facility on 11/22/17  She subsequently presented to [facility] ED on 12/7 with AMS from dialysis  In morning prior to session she was found to be altered with blood sugar 400s and she was transferred to ED prior to dialysis session  Per chart, over the past week only had partial dialysis session due to pain in sacral wound  Patient unable to provide history, however  denied other complaints including cough  On arrival , WBC elevated 23 79, Na 127, BUN 44  On arterial blood pH 7 49, Co2 33, pO2 62, lactate elevated 3 4  Initial CXR concerning for new left sided infiltrate vs edema  Pulmonology was consulted and felt that CXR more consistent with edema, no signs of pneumonia  She has remained afebrile and WBC has downtrended to 15 by morning  She was empirically placed on cefepime per admitting team for ulcer vs PNA  Initial Bcx sent 12/8, currently no growth  Inpatient consult to Infectious Diseases  Consult performed by: Toan Mustafa ordered by: SHYLA OLIVA          ROS: 10 systems reviewed, remainder is neg      Historical Information   Past Medical History:   Diagnosis Date    Anemia     CHF (congestive heart failure) ([place] [address] )     Diabetes mellitus ([place] [address] )     DVT (deep venous thrombosis) ([place] [address] )     ESRD (end stage renal disease) on dialysis ([place] [address] )     Hypercoagulable state ([place] [address] )     Factor 5 Leiden    Hyperlipidemia     Hypertension     Hypothyroidism     Lupus     Pacemaker     Parkinson's disease ([place] [address] )     Pneumonia     Psychiatric disorder     UTI (urinary tract infection)     Vascular dialysis catheter in place [facility])      Past Surgical History:   Procedure Laterality Date    AMPUTATION      CARDIAC PACEMAKER PLACEMENT      CHOLECYSTECTOMY      HYSTERECTOMY      SPINAL FUSION       Social History   History   Alcohol Use No     History   Drug Use No     History   Smoking Status    Never Smoker   Smokeless Tobacco    Never Used     Family History   Problem Relation Age of Onset    Heart disease Mother     Heart disease Father     Heart disease Brother        Meds/Allergies   MEDS:  ABX:  Cefepime 1g [id number]      Current Facility-Administered Medications:     aspirin chewable tablet 81 mg, 81 mg, Oral, Daily, Ariela Fly, DO, 81 mg at 12/08/17 1735    calcium acetate (PHOSLO) capsule 667 mg, 667 mg, Oral, TID With Meals, Ariela Fly, DO, 667 mg at 12/08/17 1735    carbidopa-levodopa (SINEMET)  mg per tablet 1 tablet, 1 tablet, Oral, TID, Ariela Fly, DO, 1 tablet at 12/08/17 2208    cefepime (MAXIPIME) IVPB (premix) 1,000 mg, 1,000 mg, Intravenous, Q24H, Ariela Fly, DO, Last Rate: 100 mL/hr at 12/08/17 1337, 1,000 mg at 12/08/17 1337    insulin glargine (LANTUS) subcutaneous injection 38 Units, 38 Units, Subcutaneous, HS, Ariela Fly, DO, 38 Units at 12/08/17 2209    iron sucrose (VENOFER) 100 mg in sodium chloride 0 9 % 100 mL IVPB, 100 mg, Intravenous, Once per day on Mon Wed Fri, Maria Del Rosario Mayen, DO, Last Rate: 105 mL/hr at 12/08/17 1609, 100 mg at 12/08/17 1609    levothyroxine tablet 37 5 mcg, 37 5 mcg, Oral, HS, Ariela Fly, DO, 37 5 mcg at 12/08/17 2208    magnesium (amino acid chelate) tablet 1 tablet, 1 tablet, Oral, BID, Ariela Fly, DO    nitroglycerin (NITROSTAT) SL tablet 0 4 mg, 0 4 mg, Sublingual, Q5 Min PRN, Ariela Fly, DO    nystatin (MYCOSTATIN) cream 1 application, 1 application, Topical, BID, Ariela Fly, DO, 1 application at 20/11/82 1337    omeprazole (PRILOSEC) suspension 2 mg/mL, 20 mg, Oral, Daily, Ariela Fly, DO, 20 mg at 12/08/17 1735    ondansetron (ZOFRAN) injection 4 mg, 4 mg, Intravenous, Q6H PRN, Ariela Fly, DO    potassium chloride (K-DUR,KLOR-CON) CR tablet 20 mEq, 20 mEq, Oral, Daily With Breakfast, Pb Ortega MD    potassium chloride 20 mEq IVPB (premix), 20 mEq, Intravenous, Once, Pb Ortega MD    sodium hypochlorite (DAKIN'S HALF-STRENGTH) 0 25 % topical solution 1 application, 1 application, Irrigation, TID, Ariela Fly, DO, 1 application at 00/35/22 2210    traMADol (ULTRAM) tablet 50 mg, 50 mg, Oral, Q6H PRN, Pb Ortega MD    Allergies   Allergen Reactions    Aminoglycosides     Aspirin     Aztreonam     Bicillin [Penicillin G Benzathine]     Doxycycline     Erythromycin     Erythromycin Base     Indomethacin     Iodinated Diagnostic Agents     Latex     Nevanac [Nepafenac]     Niacin     Other      Sodium, Betadine, Adhesive, Niacin, Indocin, SOUP    Oxytetracycline     Penicillins     Povidone      Other reaction(s):  Other (See Comments)  Iodine, Betadine    Prednisone     Sulfa Antibiotics     Tetracyclines & Related     Vancomycin          Intake/Output Summary (Last 24 hours) at 12/09/17 0836  Last data filed at 12/08/17 1730   Gross per 24 hour   Intake              500 ml   Output             2001 ml   Net            -1501 ml       PE:  Vitals:    12/08/17 2100 12/09/17 0006 12/09/17 0735 12/09/17 0908   BP:  115/56 139/66 133/63   Pulse: 93 57 94 87   Resp: 18 18 20 16   Temp:  98 9 °F (37 2 °C) 97 6 °F (36 4 °C) 97 9 °F (36 6 °C)   TempSrc:  Oral Oral    SpO2: 98% 96% 97%    Weight:   65 kg (143 lb 4 8 oz)    Height:       GEN: NAD, awake, alert  HEENT: NCAT, PERRLA, slightly skewed eyes, MMM, OP Clear  NECK: supple, trachea midline, no masses  CARDIAC: irregular, 3/6 systolic murmur heard throughout, peripheral pulses 2+, no LE edema  LUNGS: no increased wob, normal breath sounds anteriorly, limited by positioning  ABDOMEN: Soft, Nt, Nd, No HSM, LLQ PEG tube  EXTREMITIES: WWP, L hand PIC, RUE AV fistula, bandaged, no tenderness  SKIN: scattered excoriation on extremities, +sacral debub with packing to exposed bone, clear margins, no surrounding erythema, feces in wound  NEURO: contractures extremities, CN symmetric, awake and alert, responds to simple commands, says "good" when asked how she is    Invasive Devices:   Peripheral IV 12/08/17 Left Arm (Active)   Site Assessment Clean;Dry; Intact 12/9/2017  1:00 AM   Dressing Type Transparent 12/9/2017  1:00 AM   Line Status Flushed;Saline locked 12/9/2017  1:00 AM   Dressing Status Clean;Dry; Intact 12/9/2017  1:00 AM       Peripheral IV 12/08/17 Left Wrist (Active)   Site Assessment Leaking 12/8/2017 12:00 PM   Dressing Type Transparent;Securing device 12/8/2017 12:00 PM   Line Status Capped 12/8/2017 12:00 PM   Dressing Status New drainage 12/8/2017 12:00 PM       Gastrostomy/Enterostomy PEG-jejunostomy LUQ (Active)   Surrounding Skin Dry; Intact 12/8/2017  8:50 AM   Drain Status Tube feed infusing 12/8/2017  6:00 PM   Drainage Appearance None 12/8/2017  8:50 AM       Hemodialysis AV Fistula Right Other (Comment) (Active)   AV Fistula Present; Thrill;Bruit 12/8/2017  5:30 PM   Site Assessment Clean;Dry; Intact 12/8/2017  5:30 PM   Status Deaccessed 12/8/2017  5:30 PM   Dressing Status Clean;Dry; Intact 12/8/2017  5:30 PM           Lab Results:   Admission on 12/08/2017   Component Date Value    WBC 12/08/2017 23 79*    RBC 12/08/2017 2 65*    Hemoglobin 12/08/2017 7 1*    Hematocrit 12/08/2017 23 5*    MCV 12/08/2017 89     MCH 12/08/2017 26 8     MCHC 12/08/2017 30 2*    RDW 12/08/2017 18 0*    MPV 12/08/2017 9 2     Platelets 21/47/9941 341     Sodium 12/08/2017 127*    Potassium 12/08/2017 4 5     Chloride 12/08/2017 87*    CO2 12/08/2017 26     Anion Gap 12/08/2017 14*    BUN 12/08/2017 75*    Creatinine 12/08/2017 3 57*    Glucose 12/08/2017 381*    Calcium 12/08/2017 10 8*    AST 12/08/2017 22     ALT 12/08/2017 6*    Alkaline Phosphatase 12/08/2017 268*    Total Protein 12/08/2017 7 3     Albumin 12/08/2017 2 5*    Total Bilirubin 12/08/2017 0 40     eGFR 12/08/2017 12     POC Glucose 12/08/2017 371*    ABO Grouping 12/08/2017 O     Rh Factor 12/08/2017 Negative     Antibody Screen 12/08/2017 Positive     Specimen Expiration Date 12/08/2017 [id number]     LACTIC ACID 12/08/2017 2 8*    pH, Art i-STAT 12/08/2017 7 492*    pCO2, Art i-STAT 12/08/2017 33 7*    pO2, ART i-STAT 12/08/2017 62 0*    BE, i-STAT 12/08/2017 3     HCO3, Art i-STAT 12/08/2017 25 8     CO2, i-STAT 12/08/2017 27     O2 Sat, i-STAT 12/08/2017 93*    POC FIO2 12/08/2017 21     Specimen Type 12/08/2017 ARTERIAL     SITE 12/08/2017 Right Radial     HORACIO TEST 12/08/2017 Postive Horacio Test     Segmented % 12/08/2017 72     Lymphocytes % 12/08/2017 14     Monocytes % 12/08/2017 11     Eosinophils % 12/08/2017 1     Basophils % 12/08/2017 0     Metamyelocytes% 12/08/2017 1     Atypical Lymphocytes % 12/08/2017 1*    Absolute Neutrophils 12/08/2017 17 13*    Lymphocytes Absolute 12/08/2017 3 33     Monocytes Absolute 12/08/2017 2 62*    Eosinophils Absolute 12/08/2017 0 24     Basophils Absolute 12/08/2017 0 00     Total Counted 12/08/2017 100     nRBC 12/08/2017 6*    RBC Morphology 12/08/2017 Present     Anisocytosis 12/08/2017 Present     Microcytes 12/08/2017 Present     Polychromasia 12/08/2017 Present     Platelet Estimate 61/99/5546 Adequate     POC Glucose 12/08/2017 338*    ANTIBODY ID  #1 12/08/2017 Anti-C     ANTIBODY ID  #2 12/08/2017 Anti-D     ANTIBODY ID   #3 12/08/2017 Anti-K     INFLU A PCR 12/08/2017 None Detected     INFLU B PCR 12/08/2017 None Detected     RSV PCR 12/08/2017 None Detected     LACTIC ACID 12/08/2017 0 9     POC Glucose 12/08/2017 175*    Unit Product Code 12/08/2017 L2621E61     Unit Number 12/08/2017 [id number]     Unit ABO 12/08/2017 O     Unit RH 12/08/2017 NEG     Crossmatch 12/08/2017 Compatible     Unit Dispense Status 12/08/2017 Crossmatched     Unit Product Code 12/08/2017 G6610D47     Unit Number 12/08/2017 [id number]     Unit ABO 12/08/2017 O     Unit RH 12/08/2017 NEG     Crossmatch 12/08/2017 Compatible     Unit Dispense Status 12/08/2017 Crossmatched     POC Glucose 12/08/2017 266*    POC Glucose 12/09/2017 337*    Sodium 12/09/2017 135*    Potassium 12/09/2017 3 1*    Chloride 12/09/2017 95*    CO2 12/09/2017 28     Anion Gap 12/09/2017 12     BUN 12/09/2017 44*    Creatinine 12/09/2017 2 53*    Glucose 12/09/2017 350*    Calcium 12/09/2017 9 7     AST 12/09/2017 12     ALT 12/09/2017 7*    Alkaline Phosphatase 12/09/2017 202*    Total Protein 12/09/2017 6 1*    Albumin 12/09/2017 2 2*    Total Bilirubin 12/09/2017 0 30     eGFR 12/09/2017 18     WBC 12/09/2017 15 09*    RBC 12/09/2017 2 23*    Hemoglobin 12/09/2017 6 0*    Hematocrit 12/09/2017 20 3*    MCV 12/09/2017 91     MCH 12/09/2017 26 9     MCHC 12/09/2017 29 6*    RDW 12/09/2017 17 7*    Platelets 35/44/6396 261     MPV 12/09/2017 9 5     LACTIC ACID 12/09/2017 3 4*    POC Glucose 12/09/2017 410*     Imaging Studies: I have personally reviewed pertinent reports  and I have personally reviewed pertinent films in PACS CXR with hazy infiltrate on left  EKG, Pathology, and Other Studies: I have personally reviewed pertinent reports  Culture  Lab Results   Component Value Date    BLOODCX No Growth After 5 Days  11/16/2017    BLOODCX No Growth After 5 Days  11/16/2017    BLOODCX No Growth After 5 Days   11/14/2017 BLOODCX No Growth After 5 Days  05/25/2017    BLOODCX No Growth After 5 Days   05/25/2017    BLOODCX Staphylococcus epidermidis 05/22/2017    BLOODCX Staphylococcus epidermidis 05/21/2017    BLOODCX Staphylococcus capitis 05/21/2017     Lab Results   Component Value Date    WOUNDCULT 2+ Growth of Proteus mirabilis (A) 11/14/2017    WOUNDCULT 2+ Growth of Escherichia coli ESBL (A) 11/14/2017    WOUNDCULT (A) 11/14/2017     1+ Growth of Vancomycin Resistant Enterococcus faecalis    WOUNDCULT Few Colonies of Pseudomonas aeruginosa (A) 11/14/2017     Lab Results   Component Value Date    URINECX No Growth <1000 cfu/mL 11/14/2017     No results found for: SPUTUMCULTUR    Principal Problem:    Healthcare-associated pneumonia  Active Problems:    Lupus    Hyperlipidemia    Hypertension    Anemia    Parkinson's disease ([facility] [address] )    Pacemaker    Sepsis ([facility] [address] )    Type 2 diabetes mellitus with renal manifestations ([facility] [address] )    ESRD (end stage renal disease) on dialysis ([facility] [address] )    Pressure ulcer of left buttock, unstageable (HCC)    Paroxysmal atrial fibrillation (HCC)    Chronic diastolic congestive heart failure (HCC)    Toxic metabolic encephalopathy    Hyponatremia    Type 2 diabetes mellitus with hyperglycemia, with long-term current use of insulin ([facility] [address] )

## 2017-12-09 NOTE — CONSULTS
Consultation - General Surgery   Yovanny Almendarez 68 y o  female MRN: [id number]  Unit/Bed#: [address] [place] [address] [id number] Encounter: [id number]    Assessment/Plan     Assessment/Plan:  Sacral decubitus ulcer stage IV -present on admission - continue with Dakin's packing daily  Pressure offloading with repositioning  Continue nutrition via tube feeding  Malnutrition of chronic disease with albumin of 2 2  End-stage renal disease  Encephalopathy  Dysphagia  Lupus  Parkinson's  History of Present Illness   History, ROS and PFSH unobtainable from any source due to patient's mental status records obtained from chart  HPI:  Yovanny Almendarez is a 68 y o  female who presents with was made with mental status changes and acute cephalad up with the  Was found to be hyperglycemic and unresponsive  Patient has a known sacral decubitus ulcer present on admission that is being followed by the [facility] wound center       Inpatient consult to Acute Care Surgery  Consult performed by: Nando Finnegan ordered by: Benjamin Dubose          Review of Systems   Unable to perform ROS: Dementia       Historical Information   Past Medical History:   Diagnosis Date    Anemia     CHF [facility]congestive [facility] failure) ([facility] [address] [facility]     Diabetes mellitus ([facility] [address] )     DVT (deep venous thrombosis) ([facility] [address] )     ESRD (end stage renal disease) on dialysis ([facility] [address] )     Hypercoagulable state ([facility] [address] )     Factor 5 Leiden    Hyperlipidemia     Hypertension     Hypothyroidism     Lupus     Pacemaker     Parkinson's disease ([facility] [address] )     Pneumonia     Psychiatric disorder     UTI (urinary tract infection)     Vascular dialysis catheter in [facility])      Past Surgical History:   Procedure Laterality Date    AMPUTATION      CARDIAC PACEMAKER PLACEMENT      CHOLECYSTECTOMY      HYSTERECTOMY      SPINAL FUSION       Social History   History   Alcohol Use No     History   Drug Use No     History   Smoking Status    Never Smoker   Smokeless Tobacco    Never Used     Family History: non-contributory    Meds/Allergies   all current active meds have been reviewed  Allergies   Allergen Reactions    Aminoglycosides     Aspirin     Aztreonam     Bicillin [Penicillin G Benzathine]     Doxycycline     Erythromycin     Erythromycin Base     Indomethacin     Iodinated Diagnostic Agents     Latex     Nevanac [Nepafenac]     Niacin     Other      Sodium, Betadine, Adhesive, Niacin, Indocin, SOUP    Oxytetracycline     Penicillins     Povidone      Other reaction(s): Other (See Comments)  Iodine, Betadine    Prednisone     Sulfa Antibiotics     Tetracyclines & Related     Vancomycin        Objective   First Vitals:   Blood Pressure: (!) 194/79 (12/08/17 0811)  Pulse: 67 (12/08/17 0811)  Temperature: 98 3 °F (36 8 °C) (12/08/17 1039)  Temp Source: Oral (12/08/17 1039)  Respirations: (!) 29 (12/08/17 0811)  Height: 5' 2" (157 5 cm) (12/08/17 8925)  Weight - Scale: 63 5 kg (140 lb) (12/08/17 0811)  SpO2: 100 % (12/08/17 0900)    Current Vitals:   Blood Pressure: 150/67 (12/09/17 1500)  Pulse: 66 (12/09/17 1500)  Temperature: 98 2 °F (36 8 °C) (12/09/17 1500)  Temp Source: Oral (12/09/17 1500)  Respirations: 18 (12/09/17 1500)  Height: 5' (152 4 cm) (12/08/17 1039)  Weight - Scale: 65 kg (143 lb 4 8 oz) (12/09/17 0735)  SpO2: 98 % (12/09/17 1500)      Intake/Output Summary (Last 24 hours) at 12/09/17 1604  Last data filed at 12/09/17 1200   Gross per 24 hour   Intake              960 ml   Output             2001 ml   Net            -1041 ml       Invasive Devices     Peripheral Intravenous Line            Peripheral IV 12/08/17 Left Arm 1 day    Peripheral IV 12/09/17 Left Hand less than 1 day          Line            Hemodialysis AV Fistula Right Other (Comment) -- days          Drain            Gastrostomy/Enterostomy PEG-jejunostomy LUQ -- days    Gastrostomy/Enterostomy PEG-jejunostomy LUQ -- days                Physical Exam   Constitutional: No distress     HENT:   Head: Normocephalic and atraumatic  Eyes: Pupils are equal, round, and reactive to light  Cardiovascular:   Irregular rhythm   Pulmonary/Chest: Effort normal  She has decreased breath sounds  Abdominal: Soft  She exhibits no distension  There is no tenderness  Musculoskeletal:   Contractions extremities   Neurological: She displays atrophy  She exhibits abnormal muscle tone  Seizure activity: Non verbal    Skin: She is not diaphoretic  Stage IV sacral decubitus ulcer measuring approximately 8 cm across with approximately 3 cm undermining  30-40% fibropurulent slough with the rest granulation tissue  Exposure of ligaments around sacrum  Small secondary patch of stage II stage III ulcer on patient's right of the larger wound       Lab Results: I have personally reviewed pertinent lab results  Imaging: I have personally reviewed pertinent films in PACS  EKG, Pathology, and Other Studies: I have personally reviewed pertinent reports

## 2017-12-09 NOTE — PROGRESS NOTES
Progress Note - Barb Barrett 68 y o  female MRN: [id number]    Unit/Bed#: [address] [place] [address] 205-02 Encounter: [id number]      Assessment:    Principal Problem:    Healthcare-associated pneumonia  Active Problems:    Lupus    Hyperlipidemia    Hypertension    Anemia    Parkinson's disease ([facility] )    Pacemaker    Sepsis ([facility] )    Type 2 diabetes mellitus with renal manifestations ([facility] )    ESRD (end stage renal disease) on dialysis ([place] [address] )    Pressure ulcer of left buttock, unstageable (HCC)    Paroxysmal atrial fibrillation (HCC)    Chronic diastolic congestive heart failure (HCC)    Toxic metabolic encephalopathy    Hyponatremia    Type 2 diabetes mellitus with hyperglycemia, with long-term current use of insulin ([place] [address] )  Resolved Problems:    * No resolved hospital problems  *      Plan:  As discussed with covering physician last night, he received consent for blood transfusion will do this  Regarding the patient's end-stage renal disease the patient clearly needs to be dialyzed today  Regarding her elevated glucoses will step up her frequency of her BGMs every 4 hours with coverage  I would rather not transfer to the ICU and place her on insulin drip  Will make the tube feedings continuous at a lower rate until we get a better handle on her sugars  Regarding her pneumonia repeat chest x-ray tomorrow  Of note her elevated lactic acid level is skewed by the fact that she has end-stage renal disease  Subjective:   Nonverbal    Objective:     Vitals: Blood pressure 139/66, pulse 94, temperature 97 6 °F (36 4 °C), temperature source Oral, resp  rate 20, height 5' (1 524 m), weight 65 kg (143 lb 4 8 oz), SpO2 97 %  ,Body mass index is 27 99 kg/m²        Intake/Output Summary (Last 24 hours) at 12/09/17 0812  Last data filed at 12/08/17 1730   Gross per 24 hour   Intake              500 ml   Output             2001 ml   Net            -1501 ml       Physical Exam:    Eyes open not responsive  Neck supple, no lymphadenopathy no JVD  Lungs clear to auscultation bilaterally I hear nothing of consolidation  Heart appears to be atrial fibrillation  Abdomen soft, active bowel sounds, non-tender, non-distended  Extremities: no cyanosis, clubbing or edema contracted  Neuro: alert, no facial asymmetry, contracted extremities    Invasive Devices     Peripheral Intravenous Line            Peripheral IV 12/08/17 Left Arm less than 1 day    Peripheral IV 12/08/17 Left Wrist less than 1 day          Line            Hemodialysis AV Fistula Right Other (Comment) -- days          Drain            Gastrostomy/Enterostomy PEG-jejunostomy LUQ -- days    Gastrostomy/Enterostomy PEG-jejunostomy LUQ -- days                            Lab, Imaging and other studies: I have personally reviewed pertinent reports  Results from last 7 days  Lab Units 12/09/17  0510 12/08/17  0836   WBC Thousand/uL 15 09* 23 79*   HEMOGLOBIN g/dL 6 0* 7 1*   HEMATOCRIT % 20 3* 23 5*   PLATELETS Thousands/uL 261 341   LYMPHO PCT %  --  14   MONO PCT MAN %  --  11   EOSINO PCT MANUAL %  --  1       Results from last 7 days  Lab Units 12/09/17  0510 12/08/17  0836   SODIUM mmol/L 135* 127*   POTASSIUM mmol/L 3 1* 4 5   CHLORIDE mmol/L 95* 87*   CO2 mmol/L 28 26   BUN mg/dL 44* 75*   CREATININE mg/dL 2 53* 3 57*   CALCIUM mg/dL 9 7 10 8*   TOTAL PROTEIN g/dL 6 1* 7 3   BILIRUBIN TOTAL mg/dL 0 30 0 40   ALK PHOS U/L 202* 268*   ALT U/L 7* 6*   AST U/L 12 22   GLUCOSE RANDOM mg/dL 350* 381*     Lab Results   Component Value Date    TROPONINI 0 06 (H) 11/14/2017    TROPONINI 0 03 05/22/2017    TROPONINI 0 03 05/22/2017    CKTOTAL 27 06/06/2017    CKTOTAL 48 05/31/2017    CKTOTAL 21 (L) 05/24/2017    CKTOTAL 29 05/16/2014         Lab Results   Component Value Date    BLOODCX No Growth After 5 Days  11/16/2017    BLOODCX No Growth After 5 Days  11/16/2017    BLOODCX No Growth After 5 Days   11/14/2017    URINECX No Growth <1000 cfu/mL 11/14/2017    WOUNDCULT 2+ Growth of Proteus mirabilis (A) 11/14/2017    WOUNDCULT 2+ Growth of Escherichia coli ESBL (A) 11/14/2017    WOUNDCULT (A) 11/14/2017     1+ Growth of Vancomycin Resistant Enterococcus faecalis    WOUNDCULT Few Colonies of Pseudomonas aeruginosa (A) 11/14/2017       Imaging:  Results for orders placed during the hospital encounter of 12/08/17   XR chest 1 view portable    Narrative CHEST     INDICATION:  Patient unresponsive    COMPARISON:  11/19/2017    VIEWS:   AP frontal    IMAGES:  1    FINDINGS:  Dual-lead transvenous pacemaker on the right side is grossly stable in appearance from previous exam   The relatively tightly curled configuration of the leads in the axillary region might simply be a projectional phenomenon  No lead   disruption appreciated  Right-sided subclavian artery stent and vascular access device which extends along the SVC are noted and appear stable  There is also a stent partially seen within the right upper extremity  Cardiomediastinal silhouette appears unremarkable  There is a limited inspiration  There is ill-defined hazy infiltrate in the left perihilar area which is new  This might represent some asymmetric edema or developing pneumonia  Recommend continued follow-up  No pleural fluid  No pneumothorax  Visualized osseous structures appear within normal limits for the patient's age  Impression Mild ill-defined left perihilar infiltrate which might represent asymmetric edema or pneumonia  Workstation performed: [id number]       Results for orders placed in visit on 05/14/14   XR chest pa & lateral    Narrative CHEST   INDICATION- Infection  Patient on dialysis  COMPARISON- 5/19/2014   VIEWS-  Frontal and lateral projections    2 images   FINDINGS-   Stable left subclavian dialysis catheter, and right-sided pacer  The cardiomediastinal silhouette is stable  The left midlung zone and right lung base are partially obscured by   overlying hardware    The visualized lungs are clear  No pleural   effusions  Bony thorax unremarkable  IMPRESSION-   No active pulmonary disease  Transcribed on- [id number]           LEATHA Clay MD        Reading Radiologist- LEATHA Mendoza MD        Releasing Radiologist- LEATHA Mendoza MD        Released Date Time- 05/20/14 1608      ------------------------------------------------------------------------------   [facility]        PATIENT CENTERED ROUNDS: I have performed rounds with the nursing staff  This note has been constructed using a voice recognition system  Shayla Castaneda MD     Addendum-discussed case with Infectious Disease Medicine, she does not feel the patient has pneumonia  Will discontinue cefepime, will get repeat chest x-ray however  The size in the face of end-stage renal disease cefepime will stay around to the next dialysis

## 2017-12-09 NOTE — PHYSICAL THERAPY NOTE
PT screen  Order rec'd chart reviewed  Pt total care at SNF ,lift for oob, dialysisTIW  Screen only d/c PT

## 2017-12-09 NOTE — SIGNIFICANT EVENT
Review of lab results this morning show hemogram 6 0  Spoke to  Mariam Hahn on the phone who gave verbal consent for transfusion of packed red blood cells  Blood was made ready last night especially since she had a history of antibodies  2 units of packed red blood cells will be requested for transfusion 1 unit over 3-4 hours  Natalierosa Hahn will be in the hospital around 12 noon to sign any paperwork for blood transfusion needed at this time  He also requested we increase the dose of her tramadol to 50 mg on as needed basis to cope with relief of wife's pain and I told him we would do so

## 2017-12-09 NOTE — RESPIRATORY THERAPY NOTE
RT Protocol Note  Jase Fischer 68 y o  female MRN: [id number]  Unit/Bed#: [address] [place] [address] 205-02 Encounter: [id number]    Assessment    Principal Problem:    Healthcare-associated pneumonia  Active Problems:    Lupus    Hyperlipidemia    Hypertension    Anemia    Parkinson's disease ([place] [address] )    Pacemaker    Sepsis ([place] [address] )    Type 2 diabetes mellitus with renal manifestations ([place] [address] )    ESRD (end stage renal disease) on dialysis ([place] [address] )    Pressure ulcer of left buttock, unstageable ([facility])    Paroxysmal atrial fibrillation (HCC)    Chronic diastolic congestive heart failure (HCC)    Toxic metabolic encephalopathy    Hyponatremia    Type 2 diabetes mellitus with hyperglycemia, with long-term current use of insulin ([facility])      Home Pulmonary Medications:  none    Past Medical History:   Diagnosis Date    Anemia     CHF (congestive heart failure) ([place] [address] )     Diabetes mellitus ([place] [address] )     DVT (deep venous thrombosis) ([place] [address] )     ESRD (end stage renal disease) on dialysis ([place] [address] )     Hypercoagulable state ([place] [address] )     Factor 5 Leiden    Hyperlipidemia     Hypertension     Hypothyroidism     Lupus     Pacemaker     Parkinson's disease ([place] [address] )     Pneumonia     Psychiatric disorder     UTI (urinary tract infection)     Vascular dialysis catheter in place [facility])      Social History     Social History    Marital status: /Civil Union     Spouse name: N/A    Number of children: N/A    Years of education: N/A     Social History Main Topics    Smoking status: Never Smoker    Smokeless tobacco: Never Used    Alcohol use No    Drug use: No    Sexual activity: No     Other Topics Concern    None     Social History Narrative    None       Subjective         Objective    Physical Exam:   Assessment Type: Assess only  General Appearance: Awake  Respiratory Pattern: Normal  Chest Assessment: Chest expansion symmetrical  Bilateral Breath Sounds: Clear  Cough: Non-productive    Vitals:  Blood pressure 140/64, pulse 98, temperature 97 5 °F (36 4 °C), resp  rate 18, height 5' (1 524 m), weight 65 kg (143 lb 4 8 oz), SpO2 98 %        Results from last 7 days  Lab Units 12/08/17  0847   HORACIO TEST  Postive Horacio Test       Imaging and other studies: I have personally reviewed pertinent films in PACS          Plan    Respiratory Plan: No distress/Pulmonary history

## 2017-12-10 PROBLEM — J18.9 HEALTHCARE-ASSOCIATED PNEUMONIA: Status: RESOLVED | Noted: 2017-12-08 | Resolved: 2017-12-10

## 2017-12-10 PROBLEM — A41.9 SEPSIS (HCC): Status: RESOLVED | Noted: 2017-05-22 | Resolved: 2017-12-10

## 2017-12-10 PROBLEM — E87.1 HYPONATREMIA: Status: RESOLVED | Noted: 2017-11-19 | Resolved: 2017-12-10

## 2017-12-10 LAB
ANION GAP SERPL CALCULATED.3IONS-SCNC: 11 MMOL/L (ref 4–13)
ANISOCYTOSIS BLD QL SMEAR: PRESENT
BACTERIA UR QL AUTO: ABNORMAL /HPF
BASO STIPL BLD QL SMEAR: PRESENT
BASOPHILS # BLD MANUAL: 0 THOUSAND/UL (ref 0–0.1)
BASOPHILS NFR MAR MANUAL: 0 % (ref 0–1)
BILIRUB UR QL STRIP: ABNORMAL
BUN SERPL-MCNC: 61 MG/DL (ref 5–25)
CALCIUM SERPL-MCNC: 10.1 MG/DL (ref 8.3–10.1)
CHLORIDE SERPL-SCNC: 95 MMOL/L (ref 100–108)
CLARITY UR: ABNORMAL
CO2 SERPL-SCNC: 28 MMOL/L (ref 21–32)
COLOR UR: ABNORMAL
CREAT SERPL-MCNC: 3.22 MG/DL (ref 0.6–1.3)
EOSINOPHIL # BLD MANUAL: 0.46 THOUSAND/UL (ref 0–0.4)
EOSINOPHIL NFR BLD MANUAL: 3 % (ref 0–6)
ERYTHROCYTE [DISTWIDTH] IN BLOOD BY AUTOMATED COUNT: 17 % (ref 11.6–15.1)
GFR SERPL CREATININE-BSD FRML MDRD: 13 ML/MIN/1.73SQ M
GLUCOSE SERPL-MCNC: 151 MG/DL (ref 65–140)
GLUCOSE SERPL-MCNC: 176 MG/DL (ref 65–140)
GLUCOSE SERPL-MCNC: 176 MG/DL (ref 65–140)
GLUCOSE SERPL-MCNC: 207 MG/DL (ref 65–140)
GLUCOSE SERPL-MCNC: 233 MG/DL (ref 65–140)
GLUCOSE SERPL-MCNC: 279 MG/DL (ref 65–140)
GLUCOSE SERPL-MCNC: 292 MG/DL (ref 65–140)
GLUCOSE SERPL-MCNC: 316 MG/DL (ref 65–140)
GLUCOSE SERPL-MCNC: 360 MG/DL (ref 65–140)
GLUCOSE UR STRIP-MCNC: NEGATIVE MG/DL
HCT VFR BLD AUTO: 30.1 % (ref 34.8–46.1)
HGB BLD-MCNC: 9.5 G/DL (ref 11.5–15.4)
HGB UR QL STRIP.AUTO: ABNORMAL
KETONES UR STRIP-MCNC: NEGATIVE MG/DL
LEUKOCYTE ESTERASE UR QL STRIP: ABNORMAL
LYMPHOCYTES # BLD AUTO: 35 % (ref 14–44)
LYMPHOCYTES # BLD AUTO: 5.32 THOUSAND/UL (ref 0.6–4.47)
MCH RBC QN AUTO: 28.6 PG (ref 26.8–34.3)
MCHC RBC AUTO-ENTMCNC: 31.6 G/DL (ref 31.4–37.4)
MCV RBC AUTO: 91 FL (ref 82–98)
MONOCYTES # BLD AUTO: 1.06 THOUSAND/UL (ref 0–1.22)
MONOCYTES NFR BLD: 7 % (ref 4–12)
NEUTROPHILS # BLD MANUAL: 8.05 THOUSAND/UL (ref 1.85–7.62)
NEUTS SEG NFR BLD AUTO: 53 % (ref 43–75)
NITRITE UR QL STRIP: NEGATIVE
NON-SQ EPI CELLS URNS QL MICRO: ABNORMAL /HPF
PH UR STRIP.AUTO: 6 [PH] (ref 4.5–8)
PLATELET # BLD AUTO: 246 THOUSANDS/UL (ref 149–390)
PLATELET BLD QL SMEAR: ADEQUATE
PMV BLD AUTO: 8.9 FL (ref 8.9–12.7)
POLYCHROMASIA BLD QL SMEAR: PRESENT
POTASSIUM SERPL-SCNC: 3.8 MMOL/L (ref 3.5–5.3)
PROT UR STRIP-MCNC: ABNORMAL MG/DL
RBC # BLD AUTO: 3.32 MILLION/UL (ref 3.81–5.12)
RBC #/AREA URNS AUTO: ABNORMAL /HPF
RBC MORPH BLD: PRESENT
SODIUM SERPL-SCNC: 134 MMOL/L (ref 136–145)
SP GR UR STRIP.AUTO: 1.02 (ref 1–1.03)
TOTAL CELLS COUNTED SPEC: 100
UROBILINOGEN UR QL STRIP.AUTO: 0.2 E.U./DL
VARIANT LYMPHS # BLD AUTO: 2 %
WBC # BLD AUTO: 15.19 THOUSAND/UL (ref 4.31–10.16)
WBC #/AREA URNS AUTO: ABNORMAL /HPF

## 2017-12-10 PROCEDURE — 87077 CULTURE AEROBIC IDENTIFY: CPT | Performed by: INTERNAL MEDICINE

## 2017-12-10 PROCEDURE — 87186 SC STD MICRODIL/AGAR DIL: CPT | Performed by: INTERNAL MEDICINE

## 2017-12-10 PROCEDURE — 87086 URINE CULTURE/COLONY COUNT: CPT | Performed by: INTERNAL MEDICINE

## 2017-12-10 PROCEDURE — 81001 URINALYSIS AUTO W/SCOPE: CPT | Performed by: INTERNAL MEDICINE

## 2017-12-10 PROCEDURE — 94760 N-INVAS EAR/PLS OXIMETRY 1: CPT

## 2017-12-10 PROCEDURE — 87176 TISSUE HOMOGENIZATION CULTR: CPT | Performed by: INTERNAL MEDICINE

## 2017-12-10 PROCEDURE — 87070 CULTURE OTHR SPECIMN AEROBIC: CPT | Performed by: INTERNAL MEDICINE

## 2017-12-10 PROCEDURE — 82948 REAGENT STRIP/BLOOD GLUCOSE: CPT

## 2017-12-10 PROCEDURE — 87205 SMEAR GRAM STAIN: CPT | Performed by: INTERNAL MEDICINE

## 2017-12-10 PROCEDURE — 85027 COMPLETE CBC AUTOMATED: CPT | Performed by: INTERNAL MEDICINE

## 2017-12-10 PROCEDURE — 85007 BL SMEAR W/DIFF WBC COUNT: CPT | Performed by: INTERNAL MEDICINE

## 2017-12-10 PROCEDURE — 80048 BASIC METABOLIC PNL TOTAL CA: CPT | Performed by: INTERNAL MEDICINE

## 2017-12-10 RX ORDER — INSULIN ASPART 100 [IU]/ML
30 INJECTION, SUSPENSION SUBCUTANEOUS EVERY 12 HOURS
Status: DISCONTINUED | OUTPATIENT
Start: 2017-12-10 | End: 2017-12-12 | Stop reason: HOSPADM

## 2017-12-10 RX ADMIN — Medication 20 MG: at 06:22

## 2017-12-10 RX ADMIN — HYOSCYAMINE SULFATE 1 APPLICATION: 16 SOLUTION at 21:08

## 2017-12-10 RX ADMIN — CALCIUM ACETATE 667 MG: 667 CAPSULE ORAL at 15:39

## 2017-12-10 RX ADMIN — CARBIDOPA AND LEVODOPA 1 TABLET: 25; 100 TABLET ORAL at 21:09

## 2017-12-10 RX ADMIN — POTASSIUM CHLORIDE 20 MEQ: 1500 TABLET, EXTENDED RELEASE ORAL at 09:55

## 2017-12-10 RX ADMIN — INSULIN LISPRO 3 UNITS: 100 INJECTION, SOLUTION INTRAVENOUS; SUBCUTANEOUS at 04:27

## 2017-12-10 RX ADMIN — INSULIN GLARGINE 16 UNITS: 100 INJECTION, SOLUTION SUBCUTANEOUS at 09:54

## 2017-12-10 RX ADMIN — LEVOTHYROXINE SODIUM 37.5 MCG: 75 TABLET ORAL at 21:08

## 2017-12-10 RX ADMIN — INSULIN LISPRO 1 UNITS: 100 INJECTION, SOLUTION INTRAVENOUS; SUBCUTANEOUS at 19:26

## 2017-12-10 RX ADMIN — NYSTATIN 1 APPLICATION: 100000 CREAM TOPICAL at 09:57

## 2017-12-10 RX ADMIN — INSULIN LISPRO 2 UNITS: 100 INJECTION, SOLUTION INTRAVENOUS; SUBCUTANEOUS at 12:55

## 2017-12-10 RX ADMIN — CALCIUM ACETATE 667 MG: 667 CAPSULE ORAL at 09:55

## 2017-12-10 RX ADMIN — ASPIRIN 81 MG 81 MG: 81 TABLET ORAL at 09:55

## 2017-12-10 RX ADMIN — INSULIN LISPRO 2 UNITS: 100 INJECTION, SOLUTION INTRAVENOUS; SUBCUTANEOUS at 09:55

## 2017-12-10 RX ADMIN — TRAMADOL HYDROCHLORIDE 50 MG: 50 TABLET, FILM COATED ORAL at 22:16

## 2017-12-10 RX ADMIN — HYOSCYAMINE SULFATE 1 APPLICATION: 16 SOLUTION at 15:40

## 2017-12-10 RX ADMIN — TRAMADOL HYDROCHLORIDE 50 MG: 50 TABLET, FILM COATED ORAL at 10:10

## 2017-12-10 RX ADMIN — HYOSCYAMINE SULFATE 1 APPLICATION: 16 SOLUTION at 09:57

## 2017-12-10 RX ADMIN — CARBIDOPA AND LEVODOPA 1 TABLET: 25; 100 TABLET ORAL at 15:39

## 2017-12-10 RX ADMIN — CARBIDOPA AND LEVODOPA 1 TABLET: 25; 100 TABLET ORAL at 09:55

## 2017-12-10 RX ADMIN — NYSTATIN 1 APPLICATION: 100000 CREAM TOPICAL at 17:23

## 2017-12-10 RX ADMIN — TRAMADOL HYDROCHLORIDE 50 MG: 50 TABLET, FILM COATED ORAL at 15:01

## 2017-12-10 RX ADMIN — INSULIN ASPART 30 UNITS: 100 INJECTION, SUSPENSION SUBCUTANEOUS at 12:54

## 2017-12-10 RX ADMIN — INSULIN LISPRO 3 UNITS: 100 INJECTION, SOLUTION INTRAVENOUS; SUBCUTANEOUS at 00:24

## 2017-12-10 RX ADMIN — CALCIUM ACETATE 667 MG: 667 CAPSULE ORAL at 12:54

## 2017-12-10 RX ADMIN — INSULIN LISPRO 1 UNITS: 100 INJECTION, SOLUTION INTRAVENOUS; SUBCUTANEOUS at 15:42

## 2017-12-10 NOTE — PLAN OF CARE
DISCHARGE PLANNING     Discharge to home or other facility with appropriate resources Progressing        INFECTION - ADULT     Absence or prevention of progression during hospitalization Progressing     Absence of fever/infection during neutropenic period Progressing        Knowledge Deficit     Patient/family/caregiver demonstrates understanding of disease process, treatment plan, medications, and discharge instructions Progressing        METABOLIC, FLUID AND ELECTROLYTES - ADULT     Electrolytes maintained within normal limits Progressing     Fluid balance maintained Progressing     Glucose maintained within target range Progressing        Nutrition/Hydration-ADULT     Nutrient/Hydration intake appropriate for improving, restoring or maintaining nutritional needs Progressing        PAIN - ADULT     Verbalizes/displays adequate comfort level or baseline comfort level Progressing        Potential for Falls     Patient will remain free of falls Progressing        Prexisting or High Potential for Compromised Skin Integrity     Skin integrity is maintained or improved Progressing        RESPIRATORY - ADULT     Achieves optimal ventilation and oxygenation Progressing        SAFETY ADULT     Maintain or return to baseline ADL function Progressing     Maintain or return mobility status to optimal level Progressing        SKIN/TISSUE INTEGRITY - ADULT     Skin integrity remains intact Progressing     Incision(s), wounds(s) or drain site(s) healing without S/S of infection Progressing     Oral mucous membranes remain intact Progressing

## 2017-12-10 NOTE — PROGRESS NOTES
Progress Note - Kerry Proctor 68 y o  female MRN: [id number]    Unit/Bed#: [address] [place] [address] 205-02 Encounter: [id number]      Assessment:    Principal Problem:    Pressure ulcer of left buttock, unstageable ([facility])  Active Problems:    Lupus    Hyperlipidemia    Hypertension    Anemia    Parkinson's disease ([facility] )    Pacemaker    Type 2 diabetes mellitus with renal manifestations ([facility])    ESRD (end stage renal disease) on dialysis ([facility])    Paroxysmal atrial fibrillation ([facility])    Chronic diastolic congestive heart failure ([facility])    Toxic metabolic encephalopathy    Type 2 diabetes mellitus with hyperglycemia, with long-term current use of insulin ([facility])  Resolved Problems:    Sepsis ([facility] )    Hyponatremia    Healthcare-associated pneumonia      Plan: Will get culture and wound consult for the decubitus surgery is following  Current a she is on no antibiotics  Repeat chest x-ray does not show infiltrate    Regarding the diabetes will switch to 70 30 every 12 hours trying get better control of her glucoses  I believe her anemia is multifactorial mostly chronic disease    Subjective:   No response    Objective:     Vitals: Blood pressure 144/63, pulse 85, temperature 98 8 °F (37 1 °C), temperature source Oral, resp  rate 17, height 5' (1 524 m), weight 66 kg (145 lb 8 1 oz), SpO2 100 %  ,Body mass index is 28 42 kg/m²        Intake/Output Summary (Last 24 hours) at 12/10/17 1129  Last data filed at 12/10/17 1001   Gross per 24 hour   Intake             1140 ml   Output              303 ml   Net              837 ml       Physical Exam:    Alert and awake in no acute distress definitely more alert  Lungs clear to auscultation bilaterally  Heart regular rate and rythm, normal heart sounds  Abdomen soft, active bowel sounds, non-tender, non-distended  Extremities: no cyanosis, clubbing or edema  Neuro: alert, no facial asymmetry,     Invasive Devices     Peripheral Intravenous Line            Peripheral IV 12/08/17 Left Arm 2 days Peripheral IV 12/09/17 Left Hand 1 day          Line            Hemodialysis AV Fistula Right Other (Comment) -- days          Drain            Gastrostomy/Enterostomy PEG-jejunostomy LUQ -- days    Gastrostomy/Enterostomy PEG-jejunostomy LUQ -- days                            Lab, Imaging and other studies: I have personally reviewed pertinent reports  Results from last 7 days  Lab Units 12/10/17  0858 12/09/17  2305 12/09/17  0510 12/08/17  0836   WBC Thousand/uL 15 19*  --  15 09* 23 79*   HEMOGLOBIN g/dL 9 5* 9 1* 6 0* 7 1*   HEMATOCRIT % 30 1*  --  20 3* 23 5*   PLATELETS Thousands/uL 246  --  261 341   LYMPHO PCT % 35  --   --  14   MONO PCT MAN % 7  --   --  11   EOSINO PCT MANUAL % 3  --   --  1       Results from last 7 days  Lab Units 12/10/17  0858 12/09/17  0510 12/08/17  0836   SODIUM mmol/L 134* 135* 127*   POTASSIUM mmol/L 3 8 3 1* 4 5   CHLORIDE mmol/L 95* 95* 87*   CO2 mmol/L 28 28 26   BUN mg/dL 61* 44* 75*   CREATININE mg/dL 3 22* 2 53* 3 57*   CALCIUM mg/dL 10 1 9 7 10 8*   TOTAL PROTEIN g/dL  --  6 1* 7 3   BILIRUBIN TOTAL mg/dL  --  0 30 0 40   ALK PHOS U/L  --  202* 268*   ALT U/L  --  7* 6*   AST U/L  --  12 22   GLUCOSE RANDOM mg/dL 233* 350* 381*     Lab Results   Component Value Date    TROPONINI 0 06 (H) 11/14/2017    TROPONINI 0 03 05/22/2017    TROPONINI 0 03 05/22/2017    CKTOTAL 27 06/06/2017    CKTOTAL 48 05/31/2017    CKTOTAL 21 (L) 05/24/2017    CKTOTAL 29 05/16/2014         Lab Results   Component Value Date    BLOODCX No Growth at 48 hrs  12/08/2017    BLOODCX No Growth at 48 hrs  12/08/2017    BLOODCX No Growth After 5 Days   11/16/2017    URINECX No Growth <1000 cfu/mL 11/14/2017    WOUNDCULT 2+ Growth of Proteus mirabilis (A) 11/14/2017    WOUNDCULT 2+ Growth of Escherichia coli ESBL (A) 11/14/2017    WOUNDCULT (A) 11/14/2017     1+ Growth of Vancomycin Resistant Enterococcus faecalis    WOUNDCULT Few Colonies of Pseudomonas aeruginosa (A) 11/14/2017 Imaging:  Results for orders placed during the hospital encounter of 12/08/17   XR chest portable    Narrative CHEST      INDICATION: Follow-up exam      COMPARISON:  12/8/2017  VIEWS:   AP frontal; 1 image    FINDINGS:    Left chest wall pacemaker is present  Stable cardiomegaly is noted  Left perihilar opacity seen on the previous exam appears to have improved  Lungs are clear  Osseous structures are age appropriate  Impression No active disease  Workstation performed: [id number]       Results for orders placed in visit on 05/14/14   XR chest pa & lateral    Narrative CHEST   INDICATION- Infection  Patient on dialysis  COMPARISON- 5/19/2014   VIEWS-  Frontal and lateral projections    2 images   FINDINGS-   Stable left subclavian dialysis catheter, and right-sided pacer  The cardiomediastinal silhouette is stable  The left midlung zone and right lung base are partially obscured by   overlying hardware  The visualized lungs are clear  No pleural   effusions  Bony thorax unremarkable  IMPRESSION-   No active pulmonary disease  Transcribed on- [id number]           LEATHA Alicea MD        Reading LEATHA Sawyer MD        Releasing LEATHA Sawyer MD        Released Date Time- 05/20/14 1608      ------------------------------------------------------------------------------   [facility]        PATIENT CENTERED ROUNDS: I have performed rounds with the nursing staff  This note has been constructed using a voice recognition system      July Mccarthy MD

## 2017-12-11 ENCOUNTER — APPOINTMENT (INPATIENT)
Dept: DIALYSIS | Facility: HOSPITAL | Age: 77
DRG: 871 | End: 2017-12-11
Attending: INTERNAL MEDICINE
Payer: MEDICARE

## 2017-12-11 VITALS
DIASTOLIC BLOOD PRESSURE: 67 MMHG | HEART RATE: 108 BPM | TEMPERATURE: 97.6 F | WEIGHT: 149.47 LBS | OXYGEN SATURATION: 95 % | HEIGHT: 60 IN | SYSTOLIC BLOOD PRESSURE: 142 MMHG | RESPIRATION RATE: 16 BRPM | BODY MASS INDEX: 29.35 KG/M2

## 2017-12-11 PROBLEM — E11.65 TYPE 2 DIABETES MELLITUS WITH HYPERGLYCEMIA, WITH LONG-TERM CURRENT USE OF INSULIN (HCC): Status: RESOLVED | Noted: 2017-12-08 | Resolved: 2017-12-11

## 2017-12-11 PROBLEM — G92.8 TOXIC METABOLIC ENCEPHALOPATHY: Status: RESOLVED | Noted: 2017-05-30 | Resolved: 2017-12-11

## 2017-12-11 PROBLEM — Z79.4 TYPE 2 DIABETES MELLITUS WITH HYPERGLYCEMIA, WITH LONG-TERM CURRENT USE OF INSULIN (HCC): Status: RESOLVED | Noted: 2017-12-08 | Resolved: 2017-12-11

## 2017-12-11 LAB
BASOPHILS # BLD AUTO: 0.03 THOUSANDS/ΜL (ref 0–0.1)
BASOPHILS NFR BLD AUTO: 0 % (ref 0–1)
EOSINOPHIL # BLD AUTO: 0.44 THOUSAND/ΜL (ref 0–0.61)
EOSINOPHIL NFR BLD AUTO: 3 % (ref 0–6)
ERYTHROCYTE [DISTWIDTH] IN BLOOD BY AUTOMATED COUNT: 17.6 % (ref 11.6–15.1)
GLUCOSE SERPL-MCNC: 127 MG/DL (ref 65–140)
GLUCOSE SERPL-MCNC: 137 MG/DL (ref 65–140)
GLUCOSE SERPL-MCNC: 185 MG/DL (ref 65–140)
GLUCOSE SERPL-MCNC: 191 MG/DL (ref 65–140)
HCT VFR BLD AUTO: 29.6 % (ref 34.8–46.1)
HGB BLD-MCNC: 9.1 G/DL (ref 11.5–15.4)
LYMPHOCYTES # BLD AUTO: 3.12 THOUSANDS/ΜL (ref 0.6–4.47)
LYMPHOCYTES NFR BLD AUTO: 20 % (ref 14–44)
MCH RBC QN AUTO: 28.3 PG (ref 26.8–34.3)
MCHC RBC AUTO-ENTMCNC: 30.7 G/DL (ref 31.4–37.4)
MCV RBC AUTO: 92 FL (ref 82–98)
MONOCYTES # BLD AUTO: 1.08 THOUSAND/ΜL (ref 0.17–1.22)
MONOCYTES NFR BLD AUTO: 7 % (ref 4–12)
NEUTROPHILS # BLD AUTO: 10.65 THOUSANDS/ΜL (ref 1.85–7.62)
NEUTS SEG NFR BLD AUTO: 70 % (ref 43–75)
PLATELET # BLD AUTO: 266 THOUSANDS/UL (ref 149–390)
PMV BLD AUTO: 9.1 FL (ref 8.9–12.7)
RBC # BLD AUTO: 3.22 MILLION/UL (ref 3.81–5.12)
WBC # BLD AUTO: 15.32 THOUSAND/UL (ref 4.31–10.16)

## 2017-12-11 PROCEDURE — 94760 N-INVAS EAR/PLS OXIMETRY 1: CPT

## 2017-12-11 PROCEDURE — 85025 COMPLETE CBC W/AUTO DIFF WBC: CPT | Performed by: INTERNAL MEDICINE

## 2017-12-11 PROCEDURE — 82948 REAGENT STRIP/BLOOD GLUCOSE: CPT

## 2017-12-11 RX ORDER — DOXYCYCLINE HYCLATE 100 MG/1
100 CAPSULE ORAL EVERY 12 HOURS SCHEDULED
Status: DISCONTINUED | OUTPATIENT
Start: 2017-12-11 | End: 2017-12-12 | Stop reason: HOSPADM

## 2017-12-11 RX ORDER — SODIUM HYPOCHLORITE 2.5 MG/ML
1 SOLUTION TOPICAL 3 TIMES DAILY
Qty: 473 ML | Refills: 0 | Status: SHIPPED | OUTPATIENT
Start: 2017-12-11 | End: 2017-12-11

## 2017-12-11 RX ORDER — MAGNESIUM OXIDE/MAG AA CHELATE 133 MG
1 TABLET ORAL 2 TIMES DAILY
Qty: 60 TABLET | Refills: 0 | Status: SHIPPED | OUTPATIENT
Start: 2017-12-11 | End: 2018-01-10

## 2017-12-11 RX ORDER — MAGNESIUM OXIDE/MAG AA CHELATE 133 MG
1 TABLET ORAL 2 TIMES DAILY
Qty: 60 TABLET | Refills: 0 | Status: SHIPPED | OUTPATIENT
Start: 2017-12-11 | End: 2017-12-11

## 2017-12-11 RX ORDER — TRAMADOL HYDROCHLORIDE 50 MG/1
25 TABLET ORAL EVERY 6 HOURS PRN
Qty: 30 TABLET | Refills: 0 | Status: SHIPPED | OUTPATIENT
Start: 2017-12-11 | End: 2017-12-21

## 2017-12-11 RX ORDER — INSULIN ASPART 100 [IU]/ML
30 INJECTION, SUSPENSION SUBCUTANEOUS EVERY 12 HOURS
Qty: 10 ML | Refills: 0 | Status: SHIPPED | OUTPATIENT
Start: 2017-12-11 | End: 2017-12-11

## 2017-12-11 RX ORDER — INSULIN ASPART 100 [IU]/ML
30 INJECTION, SUSPENSION SUBCUTANEOUS EVERY 12 HOURS
Qty: 10 ML | Refills: 0 | Status: SHIPPED | OUTPATIENT
Start: 2017-12-11

## 2017-12-11 RX ORDER — SODIUM HYPOCHLORITE 2.5 MG/ML
1 SOLUTION TOPICAL 3 TIMES DAILY
Qty: 473 ML | Refills: 0 | Status: SHIPPED | OUTPATIENT
Start: 2017-12-11

## 2017-12-11 RX ADMIN — CARBIDOPA AND LEVODOPA 1 TABLET: 25; 100 TABLET ORAL at 08:27

## 2017-12-11 RX ADMIN — POTASSIUM CHLORIDE 20 MEQ: 1500 TABLET, EXTENDED RELEASE ORAL at 08:27

## 2017-12-11 RX ADMIN — CALCIUM ACETATE 667 MG: 667 CAPSULE ORAL at 18:07

## 2017-12-11 RX ADMIN — ASPIRIN 81 MG 81 MG: 81 TABLET ORAL at 08:27

## 2017-12-11 RX ADMIN — NYSTATIN 1 APPLICATION: 100000 CREAM TOPICAL at 18:09

## 2017-12-11 RX ADMIN — INSULIN ASPART 30 UNITS: 100 INJECTION, SUSPENSION SUBCUTANEOUS at 12:10

## 2017-12-11 RX ADMIN — INSULIN LISPRO 1 UNITS: 100 INJECTION, SOLUTION INTRAVENOUS; SUBCUTANEOUS at 00:16

## 2017-12-11 RX ADMIN — NYSTATIN 1 APPLICATION: 100000 CREAM TOPICAL at 08:29

## 2017-12-11 RX ADMIN — IRON SUCROSE 100 MG: 20 INJECTION, SOLUTION INTRAVENOUS at 18:51

## 2017-12-11 RX ADMIN — TRAMADOL HYDROCHLORIDE 50 MG: 50 TABLET, FILM COATED ORAL at 14:03

## 2017-12-11 RX ADMIN — Medication 20 MG: at 06:53

## 2017-12-11 RX ADMIN — INSULIN LISPRO 1 UNITS: 100 INJECTION, SOLUTION INTRAVENOUS; SUBCUTANEOUS at 18:07

## 2017-12-11 RX ADMIN — LEVOTHYROXINE SODIUM 37.5 MCG: 75 TABLET ORAL at 21:02

## 2017-12-11 RX ADMIN — CALCIUM ACETATE 667 MG: 667 CAPSULE ORAL at 08:27

## 2017-12-11 RX ADMIN — INSULIN LISPRO 1 UNITS: 100 INJECTION, SOLUTION INTRAVENOUS; SUBCUTANEOUS at 12:14

## 2017-12-11 RX ADMIN — DOXYCYCLINE HYCLATE 100 MG: 100 CAPSULE ORAL at 21:03

## 2017-12-11 RX ADMIN — HYOSCYAMINE SULFATE 1 APPLICATION: 16 SOLUTION at 18:09

## 2017-12-11 RX ADMIN — INSULIN ASPART 30 UNITS: 100 INJECTION, SUSPENSION SUBCUTANEOUS at 00:16

## 2017-12-11 RX ADMIN — DOXYCYCLINE HYCLATE 100 MG: 100 CAPSULE ORAL at 14:03

## 2017-12-11 RX ADMIN — CALCIUM ACETATE 667 MG: 667 CAPSULE ORAL at 12:10

## 2017-12-11 RX ADMIN — CARBIDOPA AND LEVODOPA 1 TABLET: 25; 100 TABLET ORAL at 18:07

## 2017-12-11 RX ADMIN — HYOSCYAMINE SULFATE 1 APPLICATION: 16 SOLUTION at 08:30

## 2017-12-11 NOTE — PLAN OF CARE
Problem: DISCHARGE PLANNING - CARE MANAGEMENT  Goal: Discharge to post-acute care or home with appropriate resources  INTERVENTIONS:  - Conduct assessment to determine patient/family and health care team treatment goals, and need for post-acute services based on payer coverage, community resources, and patient preferences, and barriers to discharge  - Address psychosocial, clinical, and financial barriers to discharge as identified in assessment in conjunction with the patient/family and health care team  - Arrange appropriate level of post-acute services according to patient's   needs and preference and payer coverage in collaboration with the physician and health care team  - Communicate with and update the patient/family, physician, and health care team regarding progress on the discharge plan  - Arrange appropriate transportation to post-acute venues  Outcome: Adequate for Discharge

## 2017-12-11 NOTE — PROGRESS NOTES
Progress Note - Pulmonary   Raymona Ria 68 y o  female MRN: [id number]  Unit/Bed#: [address] [place] [address] 205-02 Encounter: [id number]    Assessment:  Abnormal chest x-ray likely related to fluid overload resolved  Encephalopathy  Pressure ulcer of the left buttock  Lupus  Anemia  Pacemaker  PAF  Chronic diastolic heart failure  End-stage renal failure on dialysis  Type 2 diabetes      Plan:  No evidence of acute respiratory insufficiency, a repeat chest x-ray was normal after dialysis, no evidence of pneumonia at this time continue aspiration precautions head of the bed elevation and will follow as needed  Chief Complaint:   No complaints    Subjective:   Patient is awake and alert knows her name but is unable to participate any history  She does not appear in any distress she did not appear short of breath    Objective:     Vitals: Blood pressure 169/80, pulse 87, temperature (!) 97 4 °F (36 3 °C), temperature source Tympanic, resp  rate 17, height 5' (1 524 m), weight 67 8 kg (149 lb 7 6 oz), SpO2 92 %  ,Body mass index is 29 19 kg/m²        Intake/Output Summary (Last 24 hours) at 12/11/17 0940  Last data filed at 12/11/17 0500   Gross per 24 hour   Intake              310 ml   Output              321 ml   Net              -11 ml       Invasive Devices     Peripheral Intravenous Line            Peripheral IV 12/08/17 Left Arm 3 days    Peripheral IV 12/09/17 Left Hand 2 days          Line            Hemodialysis AV Fistula Right Other (Comment) -- days          Drain            Gastrostomy/Enterostomy PEG-jejunostomy LUQ -- days    Gastrostomy/Enterostomy PEG-jejunostomy LUQ -- days                Physical Exam: General appearance: alert, appears stated age and cooperative  Neck: no adenopathy, no carotid bruit, no JVD, supple, symmetrical, trachea midline and thyroid not enlarged, symmetric, no tenderness/mass/nodules  Lungs: clear to auscultation bilaterally  Heart: regular rate and rhythm, S1, S2 normal, no murmur, click, rub or gallop  Abdomen: soft, non-tender; bowel sounds normal; no masses,  no organomegaly  Extremities: extremities normal, atraumatic, no cyanosis or edema     Labs:   CBC:   Lab Results   Component Value Date    WBC 15 32 (H) 12/11/2017    HGB 9 1 (L) 12/11/2017    HCT 29 6 (L) 12/11/2017    MCV 92 12/11/2017     12/11/2017    MCH 28 3 12/11/2017    MCHC 30 7 (L) 12/11/2017    RDW 17 6 (H) 12/11/2017    MPV 9 1 12/11/2017   , CMP: No results found for: NA, K, CL, CO2, ANIONGAP, BUN, CREATININE, GLUCOSE, CALCIUM, AST, ALT, ALKPHOS, PROT, ALBUMIN, BILITOT, EGFR  Imaging and other studies: I have personally reviewed pertinent films in PACS

## 2017-12-11 NOTE — PROGRESS NOTES
NEPHROLOGY PROGRESS NOTE    George Schlatter 68 y o  female MRN: [id number]  Unit/Bed#: [address] [place] [address]  Encounter: [id number]  Reason for Consult:  End-stage renal disease and hemodialysis    ASSESSMENT/PLAN:  1  Renal     Patient is end-stage renal disease hemodialysis is   Dialysis treatment today as outlined below  Overall patient has poor prognosis continue to monitor dry weight and labs periodically  HEMODIALYSIS PROCEDURE NOTE  The patient was seen and examined on hemodialysis  Time:  3   hours  Sodium:  138 Blood flow:  350   Dialyzer: F180 Potassium:  3  Dialysate flow:  600   Access:  AV Bicarbonate:  35 Ultrafiltration goal:  2        2  Decubitus ulcer  Continue wound debridement care surgery following  SUBJECTIVE:  ROS  Patient does not provide good review of systems just looks at we with eyes open no distress  OBJECTIVE:  Current Weight: Weight - Scale: 67 8 kg (149 lb 7 6 oz)  Vitals:Temp (24hrs), Av 9 °F (36 6 °C), Min:97 4 °F (36 3 °C), Max:98 2 °F (36 8 °C)  Current: Temperature: (!) 97 4 °F (36 3 °C)   Blood pressure 169/80, pulse 87, temperature (!) 97 4 °F (36 3 °C), temperature source Tympanic, resp  rate 17, height 5' (1 524 m), weight 67 8 kg (149 lb 7 6 oz), SpO2 92 %  ,Body mass index is 29 19 kg/m²  Intake/Output Summary (Last 24 hours) at 17 0829  Last data filed at 17 0500   Gross per 24 hour   Intake              310 ml   Output              321 ml   Net              -11 ml       Physical Exam: /80   Pulse 87   Temp (!) 97 4 °F (36 3 °C) (Tympanic)   Resp 17   Ht 5' (1 524 m)   Wt 67 8 kg (149 lb 7 6 oz)   LMP  (LMP Unknown)   SpO2 92%   BMI 29 19 kg/m²   Physical Exam   Constitutional: No distress  Eyes: No scleral icterus  Neck: No JVD present  Cardiovascular: Normal rate  Exam reveals no friction rub  Pulmonary/Chest: Effort normal  No respiratory distress  Abdominal: Soft  She exhibits no distension   There is no tenderness         Medications:    Current Facility-Administered Medications:     aspirin chewable tablet 81 mg, 81 mg, Oral, Daily, Ariela Fly, DO, 81 mg at 12/11/17 0827    calcium acetate (PHOSLO) capsule 667 mg, 667 mg, Oral, TID With Meals, Ariela Fly, DO, 667 mg at 12/11/17 0827    carbidopa-levodopa (SINEMET)  mg per tablet 1 tablet, 1 tablet, Oral, TID, Ariela Fly, DO, 1 tablet at 12/11/17 0827    insulin aspart protamine-insulin aspart (NovoLOG 70/30) 100 units/mL subcutaneous injection 30 Units, 30 Units, Subcutaneous, Q12H, Yuniel Bah MD, 30 Units at 12/11/17 0016    insulin lispro (HumaLOG) 100 units/mL subcutaneous injection 1-5 Units, 1-5 Units, Subcutaneous, Q4H, 1 Units at 12/11/17 0016 **AND** Fingerstick Glucose (POCT), , , 4 times day, Yuniel Bah MD    iron sucrose (VENOFER) 100 mg in sodium chloride 0 9 % 100 mL IVPB, 100 mg, Intravenous, Once per day on Mon Wed Fri, Maria Del Rosario Ghoshvedi, DO, Last Rate: 105 mL/hr at 12/08/17 1609, 100 mg at 12/08/17 1609    levothyroxine tablet 37 5 mcg, 37 5 mcg, Oral, HS, Ariela Fly, DO, 37 5 mcg at 12/10/17 2108    magnesium (amino acid chelate) tablet 1 tablet, 1 tablet, Oral, BID, Ariela Fly, DO    nitroglycerin (NITROSTAT) SL tablet 0 4 mg, 0 4 mg, Sublingual, Q5 Min PRN, Ariela Fly, DO    nystatin (MYCOSTATIN) cream 1 application, 1 application, Topical, BID, Ariela Fly, DO, 1 application at 14/93/20 1723    omeprazole (PRILOSEC) suspension 2 mg/mL, 20 mg, Oral, Daily, Ariela Fly, DO, 20 mg at 12/11/17 0653    ondansetron (ZOFRAN) injection 4 mg, 4 mg, Intravenous, Q6H PRN, Ariela Fly, DO    sodium hypochlorite (DAKIN'S HALF-STRENGTH) 0 25 % topical solution 1 application, 1 application, Irrigation, TID, Ariela Mehta DO, 1 application at 40/19/07 2108    traMADol (ULTRAM) tablet 50 mg, 50 mg, Oral, Q6H PRN, Ancil Ormond, MD, 50 mg at 12/10/17 1805    Laboratory Results:  Lab Results   Component Value Date    WBC 15 32 (H) 12/11/2017    HGB 9 1 (L) 12/11/2017    HCT 29 6 (L) 12/11/2017    MCV 92 12/11/2017     12/11/2017     Lab Results   Component Value Date    GLUCOSE 233 (H) 12/10/2017    CALCIUM 10 1 12/10/2017     (L) 12/10/2017    K 3 8 12/10/2017    CO2 28 12/10/2017    CL 95 (L) 12/10/2017    BUN 61 (H) 12/10/2017    CREATININE 3 22 (H) 12/10/2017     Lab Results   Component Value Date    CALCIUM 10 1 12/10/2017    PHOS 3 3 11/15/2017     No results found for: LABPROT

## 2017-12-11 NOTE — SOCIAL WORK
Patient is a resident of [facility] - 15 day MA bed hold  She is dependent for care, is bedbound  She is a dialysis patient - goes to [facility]  Patient is medically cleared for discharge  [facility] to transport patient at 1900 today back to [facility]  Spouse notified

## 2017-12-11 NOTE — PROGRESS NOTES
Progress Note - Infectious Disease   Maureen Rod 68 y o  female MRN: [id number]  Unit/Bed#: [address] [place] [address] [id number] Encounter: [id number]    Assessment:  77F h/o Lupus on plaquenil c/b ESRD on Hd, chronic sacral wound, IDDM, Aifb, CHF s/p PPM, HTN, PAD, h/o spinal fusion  She presents with AMS and hyperglycemia  Initial labs with lactic acidosis and leukocytosis, and CXR concerning for L sided infiltrate in setting of partial and missed dialysis  Otherwise afebrile      No overt signs of infection  She is certainly high risk and would be most concerned for CLABSI from AV fistula vs acute on chronic osteomyelitis from sacral wound  However on exam wound looks good  No clinical evidence of PNA (lungs clear, no hypoxia/hypercarbia, CXR explained by edema)  Suspect AMS driven by hyperglycemia and metabolic derangement from missed dialysis this week  Hyperglycemia itself can cause leukemoid reaction leading to leukocytosis        1 Leukocytosis - Resolving  - would f/u initial blood cultures sent 12/8  - recommend correcting underlying hyperglycemia and metabolic abnormalities with insulin and dialysis     2  Sacral wound - appears intact  - recommend wound care while hospitalized  Superficial wound cx are growing GNR, likely colonization  3  H/o CoNS infective endocarditis with PPM infection - s/p 6 week course of daptomycin in June and now on chronic suppressive therapy with doxycycline due to PPM being left in place  Would resume it now       4  Drug allergies - Numerous allergies listed in chart with unknown reactions  Currently tolerating cefepime  If clinical deterioration would consider empiric Daptomycin given h/o VRE and Carbepenem such as meropenem given ESBL/MDR-PSA        Will follow  Subjective/Objective   Chief Complaint: Leukocytosis    Subjective: denies any pain and remains afebrile       Objective:     HR:  [74-95] 87  Resp:  [17-18] 17  BP: (139-186)/(63-80) 169/80  SpO2:  [92 %-94 %] 94 %  Temp (24hrs), Av 9 °F (36 6 °C), Min:97 4 °F (36 3 °C), Max:98 2 °F (36 8 °C)  Current: Temperature: (!) 97 4 °F (36 3 °C)    Physical Exam:  GEN: NAD, awake, alert  HEENT: NCAT, PERRLA, slightly skewed eyes, MMM, OP Clear  NECK: supple, trachea midline, no masses  CARDIAC: irregular, 3/6 systolic murmur heard throughout, peripheral pulses 2+, no LE edema  LUNGS: no increased wob, normal breath sounds anteriorly, limited by positioning  ABDOMEN: Soft, Nt, Nd, No HSM, LLQ PEG tube  EXTREMITIES: WWP, L hand PIC, RUE AV fistula, bandaged, no tenderness  SKIN: no rashes, +sacral debub   NEURO: contractures extremities, CN symmetric, awake and alert, responds to simple commands    Invasive Devices     Peripheral Intravenous Line            Peripheral IV 17 Left Arm 3 days    Peripheral IV 17 Left Hand 2 days          Line            Hemodialysis AV Fistula Right Other (Comment) -- days          Drain            Gastrostomy/Enterostomy PEG-jejunostomy LUQ -- days    Gastrostomy/Enterostomy PEG-jejunostomy LUQ -- days                Lab, Imaging and other studies: I have personally reviewed pertinent reports

## 2017-12-11 NOTE — DISCHARGE SUMMARY
Discharge Summary - [address] [facility]    Patient Information: James Fish 68 y o  female MRN: [id number]  Unit/Bed#: [address] [place], [place] [address] Encounter: [id number]    Discharging Physician / Practitioner: Keshia Enrique MD  PCP: Delgado Maya DO  Admission Date: 12/8/2017  Discharge Date: 12/11/17    Reason for Admission:  Acute toxic metabolic encephalopathy    Discharge Diagnoses:     Principal Problem:    Pressure ulcer of left buttock, unstageable ([facility] )  Active Problems:    Hypertension    Parkinson's disease ([facility] )    Type 2 diabetes mellitus with renal manifestations ([facility] )    ESRD (end stage renal disease) on dialysis ([facility] )    Paroxysmal atrial fibrillation ([facility])    Chronic diastolic congestive heart failure ([facility] )  Resolved Problems:    Sepsis ([facility] )    Toxic metabolic encephalopathy    Hyponatremia    Healthcare-associated pneumonia    Type 2 diabetes mellitus with hyperglycemia, with long-term current use of insulin ([facility] )      Consultations During Hospital Stay:  · Surgery  · Infectious Disease  · Nephrology  · Pulmonary    Procedures Performed:     · None    Significant Findings / Test Results:     · Chest x-ray on 12/9/2017:  No acute disease of the chest  · Blood cultures x2, no growth at 48 hours  · Influenza and RSV PCR negative    Incidental Findings:   · None    Test Results Pending at Discharge (will require follow up): · None     Outpatient Tests Requested:  · None    Complications:  None    Hospital Course:     James Fish is a 68 y o  female patient who originally presented to the hospital on 12/8/2017 due to toxic metabolic encephalopathy due to uncontrolled diabetes/hyperglycemia as well as uremia from end-stage renal disease  Initially there was some concern that the patient had pneumonia but repeat chest x-ray showed no pneumonia  The patient was started antibiotics but they were stopped  Multiple consultants saw the patient as above    Unfortunately the patient has a stage IV left buttock and sacral decubitus ulcer  It does not appear actively infected and does not require acute surgical intervention  The patient can follow up with with General surgery or Plastic surgery outside of the hospital   As per the Infectious Disease consult as well as my assessment the patient does not appear to have an acute active infection at this time  Unfortunately she does have multiple drug allergies and a history of VRE and ESBL/MDR-PSA  Overall the patient's prognosis is dismal and I suspect she will have multiple recurrent hospitalizations  The patient's son has made it clear that he is not interested in hospice for his mother which would be appropriate  Condition at Discharge: fair     Discharge Day Visit / Exam:     Subjective:  No new complaints today  Vitals: Blood Pressure: 169/80 (12/11/17 0731)  Pulse: 87 (12/11/17 0731)  Temperature: (!) 97 4 °F (36 3 °C) (12/11/17 0731)  Temp Source: Tympanic (12/11/17 0731)  Respirations: 17 (12/11/17 0731)  Height: 5' (152 4 cm) (12/08/17 1039)  Weight - Scale: 67 8 kg (149 lb 7 6 oz) (12/11/17 0731)  SpO2: 94 % (12/11/17 0943)  Exam:   Physical Exam  Gen: NAD, AAOx3, appears chronically ill  Eyes: EOMI, PERRLA, no scleral icterus  ENMT:  Oropharynx clear of erythema or exudates, no nasal discharge, no otic discharge, moist mucous membranes  Neck:  Supple  Lymph:  No anterior or posterior cervical or supraclavicular lymphadenopathy  Cardiovascular:  Regular rate and rhythm, normal S1-S2, no murmurs, rubs, or gallops  Lungs:  Clear to auscultation bilaterally, no wheezes, or rales, or rhonchi  Abdomen:  Positive bowel sounds, soft, nontender, nondistended, no palpable organomegaly   Skin:  Left buttock and sacral stage IV ulcer without signs of active infection  Neuro: Cranial nerves 2-12 are intact    Discharge instructions/Information to patient and family:   See after visit summary for information provided to patient and family        Provisions for Follow-Up Care:  See after visit summary for information related to follow-up care and any pertinent home health orders  Disposition:     Chel Bailey at [address] [facility] to [facility]:   · Not Applicable to this Patient - Not Applicable to this Patient    Planned Readmission: None     Discharge Statement:  I spent 30 minutes discharging the patient  This time was spent on the day of discharge  I had direct contact with the patient on the day of discharge  Greater than 50% of the total time was spent examining patient, answering all patient questions, arranging and discussing plan of care with patient as well as directly providing post-discharge instructions  Additional time then spent on discharge activities  Discharge Medications:  See after visit summary for reconciled discharge medications provided to patient and family        ** Please Note: This note has been constructed using a voice recognition system **

## 2017-12-11 NOTE — CONSULTS
The patient has been a surgical patient for the stage 4 pressure injury of the sacral region   The patient was seen by the surgical team Dr Madeline Ribeiro on 12/8/17   The patient is ordered Dakin's solution packing for the wound   Will refer to the surgical consult for management of the sacral wound   Wound care will sign off    Denver Fisher

## 2017-12-11 NOTE — SOCIAL WORK
Patients spouse requesting patients dialysis be transferred [facility]  He acknowledges that this request was denied when patient was last hospitalized 11/22/2017 at [facility] by [facility]  Called to speak with [facility] dialysis re: denial reason and if a chair would be available now  Await call back

## 2017-12-12 LAB
ATRIAL RATE: 214 BPM
QRS AXIS: -70 DEGREES
QRSD INTERVAL: 152 MS
QT INTERVAL: 444 MS
QTC INTERVAL: 458 MS
T WAVE AXIS: 65 DEGREES
VENTRICULAR RATE: 64 BPM

## 2017-12-12 NOTE — NURSING NOTE
AVS and discharge summary reviewed with [facility] receiving Rn  Address all questions and concerns

## 2017-12-12 NOTE — PLAN OF CARE

## 2017-12-13 LAB
BACTERIA BLD CULT: NORMAL
BACTERIA BLD CULT: NORMAL
BACTERIA TISS AEROBE CULT: ABNORMAL
BACTERIA UR CULT: ABNORMAL
GRAM STN SPEC: ABNORMAL

## 2017-12-22 ENCOUNTER — GENERIC CONVERSION - ENCOUNTER (OUTPATIENT)
Dept: FAMILY MEDICINE CLINIC | Facility: CLINIC | Age: 77
End: 2017-12-22

## 2018-01-12 NOTE — PROCEDURES
Procedures by Becky Jones MD  at 11/15/2017 10:25 AM      Author:  Becky Jones MD Service:  Surgery-General Author Type:  Resident    Filed:  11/15/2017 11:02 AM Date of Service:  11/15/2017 10:25 AM Status:  Attested Addendum    :  Becky Jones MD (Resident)      Related Notes:  Original Note by Becky Jones MD (Resident) filed at 11/15/2017 10:59 AM      Cosigner:  Joce Us MD at 11/17/2017 12:43 PM        Procedure Orders:       1  INCISION AND DRAINAGE [[id number]] ordered by Becky Jones MD at 11/15/17 1026                 Post-procedure Diagnoses:       1  Decubitus ulcer of sacral region, stage 4 [facility]) [B41 [id number]]              Attestation signed by Joce Us MD at 11/17/2017 12:43 PM        Teaching Physician Statement  I personally supervised this procedure  I discussed with the Resident  I agree with the resident's procedure note and plan of care  Joce Us MD  [id number] at 10:30 a m  Incision and drain  Date/Time: 11/15/2017 10:26 AM  Performed by: Ankur Hernandez  Authorized by: Rk Mitchell     Patient location:  Bedside  Location:     Indications for incision and drainage: decubitus ulcer  Size:  8cm x 8cm x 4cm    Location: sacrum  Procedure details:     Complexity:  Simple    Scalpel size: scissors  Techniques: debrided fibrinous material from wound base  Packing material: kerlex  Post-procedure details:     Patient tolerance of procedure: Tolerated well, no immediate complications  Comments:      Wound is a 8cm x 8cm x 4cm sacral decubitus ulcer, stage IV present on admission  I performed an excisional debridement of the decubitus ulcer  Scissors were used to cut tissue  The tissue removed was non-viable and fibrinous  The wound had exposed bone, as well as fibrinous material  Minimal devitalized tissue was present and this was cut away  At the end, wound remained 8cm x 8cm x4cm   No pus  No gangrenous tissue  Depth of debridement was to bone as bone was already exposed  Packed with Kerlex  Nurses can remove later today and place betadine soaked kerlex into wound daily      Kamlesh Milton MD PGY-4  10:58 AM  11/15/17                           Received for:Russel Celeste MD  Nov 17 2017 12:43PM Eastern Standard Time

## 2018-01-17 NOTE — PROCEDURES
Procedures by Marian Meyer MD  at 6/5/2017  5:21 PM      Author:  Marian Meyer MD Service:  Interventional Radiology  Author Type:  Physician     Filed:  6/5/2017  5:23 PM Date of Service:  6/5/2017  5:21 PM Status:  Signed     :  Marian Meyer MD (Physician)         Procedure Orders:       1  CENTRAL LINE [[id number]] ordered by Mraian Meyer MD at 06/05/17 1721                    Central Line Insertion  Date/Time: 6/5/2017 5:21 PM  Performed by: Vincent Jacobson  Authorized by: Tristin LEMOS     Patient location:  [facility] protocol:     Procedure explained and questions answered to patient or proxy's satisfaction: yes      Relevant documents present and verified: yes      Immediately prior to procedure, a time out was called: yes       Patient identity confirmed:  Arm band and provided demographic data  Pre-procedure details:     Hand hygiene: Hand hygiene performed prior to insertion      Sterile barrier technique: All elements of maximal sterile technique followed      Skin preparation:  2% chlorhexidine    Skin preparation agent: Skin preparation agent completely dried prior to procedure    Indications:     Central line indications: vascular access    Anesthesia (see MAR for exact dosages): Anesthesia method:  Local infiltration    Local anesthetic:  Lidocaine 1% w/o epi  Procedure details:     Location: Left external jugular vein  Approach: percutaneous technique used      Catheter type:  Double lumen    Catheter size:  5 Fr    Ultrasound guidance: yes      Successful placement: yes      Vessel of catheter tip end:  Left innominate vein  Post-procedure details:     Post-procedure:  Dressing applied    Assessment:  Blood return through all ports    Post-procedure complications: none      Patient tolerance of procedure: Tolerated well, no immediate complications  Comments:      Wire could not be advanced centrally from the left external jugular vein   The tip was placed in the innominate vein  She likely has a central occlusion on the left side given her history of prior left pacemaker and prior dialysis catheters  The  PICC can be used as a midline type central catheter                   Received for:Ramon Jacobo MD  Jun 5 2017  5:24PM [facility] Standard Time

## 2022-02-22 NOTE — ED NOTES
Message sent through my chart Patients brief, david and sheets changed            Dolores Schultz, RN  11/14/17 [id number]

## 2023-08-07 NOTE — PLAN OF CARE
Problem: Nutrition/Hydration-ADULT  Goal: Nutrient/Hydration intake appropriate for improving, restoring or maintaining nutritional needs  Monitor and assess patient's nutrition/hydration status for malnutrition (ex- brittle hair, bruises, dry skin, pale skin and conjunctiva, muscle wasting, smooth red tongue, and disorientation)  Collaborate with interdisciplinary team and initiate plan and interventions as ordered  Monitor patient's weight and dietary intake as ordered or per policy  Utilize nutrition screening tool and intervene per policy  Determine patient's food preferences and provide high-protein, high-caloric foods as appropriate       INTERVENTIONS:  - Monitor oral intake, urinary output, labs, and treatment plans  - Assess nutrition and hydration status and recommend course of action  - Evaluate amount of meals eaten  - Assist patient with eating if necessary   - Allow adequate time for meals  - Recommend/ encourage appropriate diets, oral nutritional supplements, and vitamin/mineral supplements  - Order, calculate, and assess calorie counts as needed  - Recommend, monitor, and adjust tube feedings and TPN/PPN based on assessed needs  - Assess need for intravenous fluids  - Provide specific nutrition/hydration education as appropriate  - Include patient/family/caregiver in decisions related to nutrition    Outcome: Progressing V-Y Plasty Text: The defect edges were debeveled with a #15c scalpel blade.  Given the location of the defect, shape of the defect and the proximity to free margins an V-Y advancement flap was deemed most appropriate.  Using a sterile surgical marker, an appropriate advancement flap was drawn incorporating the defect and placing the expected incisions within the relaxed skin tension lines where possible.    The area thus outlined was incised deep to adipose tissue with a #15 scalpel blade.  The skin margins were undermined to an appropriate distance in all directions utilizing iris scissors.